# Patient Record
Sex: MALE | Race: BLACK OR AFRICAN AMERICAN | Employment: OTHER
[De-identification: names, ages, dates, MRNs, and addresses within clinical notes are randomized per-mention and may not be internally consistent; named-entity substitution may affect disease eponyms.]

---

## 2017-01-04 ENCOUNTER — HOME CARE VISIT (OUTPATIENT)
Dept: SCHEDULING | Facility: HOME HEALTH | Age: 77
End: 2017-01-04
Payer: MEDICARE

## 2017-01-04 VITALS
HEART RATE: 98 BPM | TEMPERATURE: 95.5 F | RESPIRATION RATE: 18 BRPM | SYSTOLIC BLOOD PRESSURE: 140 MMHG | OXYGEN SATURATION: 96 % | DIASTOLIC BLOOD PRESSURE: 80 MMHG

## 2017-01-04 PROCEDURE — G0157 HHC PT ASSISTANT EA 15: HCPCS

## 2017-01-06 ENCOUNTER — HOME CARE VISIT (OUTPATIENT)
Dept: SCHEDULING | Facility: HOME HEALTH | Age: 77
End: 2017-01-06
Payer: MEDICARE

## 2017-01-06 VITALS
RESPIRATION RATE: 16 BRPM | SYSTOLIC BLOOD PRESSURE: 100 MMHG | HEART RATE: 72 BPM | TEMPERATURE: 97.8 F | DIASTOLIC BLOOD PRESSURE: 70 MMHG

## 2017-01-06 PROCEDURE — G0157 HHC PT ASSISTANT EA 15: HCPCS

## 2017-01-09 ENCOUNTER — HOME CARE VISIT (OUTPATIENT)
Dept: SCHEDULING | Facility: HOME HEALTH | Age: 77
End: 2017-01-09
Payer: MEDICARE

## 2017-01-09 VITALS
DIASTOLIC BLOOD PRESSURE: 90 MMHG | TEMPERATURE: 96 F | HEART RATE: 76 BPM | RESPIRATION RATE: 18 BRPM | SYSTOLIC BLOOD PRESSURE: 140 MMHG

## 2017-01-09 PROCEDURE — G0157 HHC PT ASSISTANT EA 15: HCPCS

## 2017-01-12 ENCOUNTER — HOME CARE VISIT (OUTPATIENT)
Dept: SCHEDULING | Facility: HOME HEALTH | Age: 77
End: 2017-01-12
Payer: MEDICARE

## 2017-01-12 PROCEDURE — G0151 HHCP-SERV OF PT,EA 15 MIN: HCPCS

## 2017-01-16 VITALS
OXYGEN SATURATION: 98 % | RESPIRATION RATE: 24 BRPM | HEART RATE: 80 BPM | SYSTOLIC BLOOD PRESSURE: 142 MMHG | DIASTOLIC BLOOD PRESSURE: 78 MMHG | TEMPERATURE: 98.2 F

## 2017-01-17 ENCOUNTER — HOME CARE VISIT (OUTPATIENT)
Dept: SCHEDULING | Facility: HOME HEALTH | Age: 77
End: 2017-01-17
Payer: MEDICARE

## 2017-01-17 PROCEDURE — G0299 HHS/HOSPICE OF RN EA 15 MIN: HCPCS

## 2017-01-18 VITALS
TEMPERATURE: 97.3 F | DIASTOLIC BLOOD PRESSURE: 73 MMHG | RESPIRATION RATE: 20 BRPM | SYSTOLIC BLOOD PRESSURE: 150 MMHG | HEART RATE: 64 BPM

## 2017-01-19 ENCOUNTER — HOME CARE VISIT (OUTPATIENT)
Dept: SCHEDULING | Facility: HOME HEALTH | Age: 77
End: 2017-01-19
Payer: MEDICARE

## 2017-01-19 ENCOUNTER — HOME CARE VISIT (OUTPATIENT)
Dept: HOME HEALTH SERVICES | Facility: HOME HEALTH | Age: 77
End: 2017-01-19
Payer: MEDICARE

## 2017-01-19 PROCEDURE — G0151 HHCP-SERV OF PT,EA 15 MIN: HCPCS

## 2017-01-20 ENCOUNTER — HOME CARE VISIT (OUTPATIENT)
Dept: SCHEDULING | Facility: HOME HEALTH | Age: 77
End: 2017-01-20
Payer: MEDICARE

## 2017-01-20 ENCOUNTER — HOME CARE VISIT (OUTPATIENT)
Dept: HOME HEALTH SERVICES | Facility: HOME HEALTH | Age: 77
End: 2017-01-20
Payer: MEDICARE

## 2017-01-20 VITALS
HEART RATE: 71 BPM | TEMPERATURE: 97.7 F | DIASTOLIC BLOOD PRESSURE: 80 MMHG | SYSTOLIC BLOOD PRESSURE: 120 MMHG | RESPIRATION RATE: 18 BRPM

## 2017-01-20 VITALS
SYSTOLIC BLOOD PRESSURE: 138 MMHG | DIASTOLIC BLOOD PRESSURE: 78 MMHG | TEMPERATURE: 98.1 F | RESPIRATION RATE: 24 BRPM | HEART RATE: 74 BPM | OXYGEN SATURATION: 98 %

## 2017-01-20 PROCEDURE — G0299 HHS/HOSPICE OF RN EA 15 MIN: HCPCS

## 2017-01-23 ENCOUNTER — HOME CARE VISIT (OUTPATIENT)
Dept: SCHEDULING | Facility: HOME HEALTH | Age: 77
End: 2017-01-23
Payer: MEDICARE

## 2017-01-23 PROCEDURE — G0155 HHCP-SVS OF CSW,EA 15 MIN: HCPCS

## 2017-01-24 ENCOUNTER — HOME CARE VISIT (OUTPATIENT)
Dept: SCHEDULING | Facility: HOME HEALTH | Age: 77
End: 2017-01-24
Payer: MEDICARE

## 2017-01-24 VITALS
RESPIRATION RATE: 18 BRPM | SYSTOLIC BLOOD PRESSURE: 128 MMHG | DIASTOLIC BLOOD PRESSURE: 70 MMHG | HEART RATE: 74 BPM | TEMPERATURE: 97 F

## 2017-01-24 PROCEDURE — G0299 HHS/HOSPICE OF RN EA 15 MIN: HCPCS

## 2017-01-26 ENCOUNTER — HOME CARE VISIT (OUTPATIENT)
Dept: HOME HEALTH SERVICES | Facility: HOME HEALTH | Age: 77
End: 2017-01-26
Payer: MEDICARE

## 2017-01-26 VITALS
DIASTOLIC BLOOD PRESSURE: 88 MMHG | RESPIRATION RATE: 18 BRPM | SYSTOLIC BLOOD PRESSURE: 137 MMHG | TEMPERATURE: 97.9 F | HEART RATE: 72 BPM

## 2017-01-26 PROCEDURE — G0299 HHS/HOSPICE OF RN EA 15 MIN: HCPCS

## 2017-01-28 ENCOUNTER — HOSPITAL ENCOUNTER (EMERGENCY)
Age: 77
Discharge: HOME | End: 2017-01-28
Attending: EMERGENCY MEDICINE
Payer: MEDICARE

## 2017-01-28 ENCOUNTER — APPOINTMENT (OUTPATIENT)
Dept: GENERAL RADIOLOGY | Age: 77
End: 2017-01-28
Attending: EMERGENCY MEDICINE
Payer: MEDICARE

## 2017-01-28 VITALS
OXYGEN SATURATION: 93 % | SYSTOLIC BLOOD PRESSURE: 158 MMHG | WEIGHT: 230 LBS | DIASTOLIC BLOOD PRESSURE: 70 MMHG | BODY MASS INDEX: 34.07 KG/M2 | TEMPERATURE: 98.5 F | HEIGHT: 69 IN | RESPIRATION RATE: 18 BRPM | HEART RATE: 96 BPM

## 2017-01-28 DIAGNOSIS — J06.9 ACUTE UPPER RESPIRATORY INFECTION: Primary | ICD-10-CM

## 2017-01-28 LAB
ALBUMIN SERPL BCP-MCNC: 4.3 G/DL (ref 3.2–4.6)
ALBUMIN/GLOB SERPL: 1 {RATIO} (ref 1.2–3.5)
ALP SERPL-CCNC: 117 U/L (ref 50–136)
ALT SERPL-CCNC: 31 U/L (ref 12–65)
ANION GAP BLD CALC-SCNC: 10 MMOL/L (ref 7–16)
AST SERPL W P-5'-P-CCNC: 61 U/L (ref 15–37)
BASOPHILS # BLD AUTO: 0 K/UL (ref 0–0.2)
BASOPHILS # BLD: 0 % (ref 0–2)
BILIRUB SERPL-MCNC: 1 MG/DL (ref 0.2–1.1)
BUN SERPL-MCNC: 19 MG/DL (ref 8–23)
CALCIUM SERPL-MCNC: 9.3 MG/DL (ref 8.3–10.4)
CHLORIDE SERPL-SCNC: 102 MMOL/L (ref 98–107)
CO2 SERPL-SCNC: 27 MMOL/L (ref 21–32)
CREAT SERPL-MCNC: 1.81 MG/DL (ref 0.8–1.5)
DIFFERENTIAL METHOD BLD: ABNORMAL
EOSINOPHIL # BLD: 0 K/UL (ref 0–0.8)
EOSINOPHIL NFR BLD: 0 % (ref 0.5–7.8)
ERYTHROCYTE [DISTWIDTH] IN BLOOD BY AUTOMATED COUNT: 13.9 % (ref 11.9–14.6)
GLOBULIN SER CALC-MCNC: 4.1 G/DL (ref 2.3–3.5)
GLUCOSE SERPL-MCNC: 88 MG/DL (ref 65–100)
HCT VFR BLD AUTO: 44.1 % (ref 41.1–50.3)
HGB BLD-MCNC: 14.7 G/DL (ref 13.6–17.2)
IMM GRANULOCYTES # BLD: 0 K/UL (ref 0–0.5)
IMM GRANULOCYTES NFR BLD AUTO: 0.3 % (ref 0–5)
LYMPHOCYTES # BLD AUTO: 14 % (ref 13–44)
LYMPHOCYTES # BLD: 1.3 K/UL (ref 0.5–4.6)
MCH RBC QN AUTO: 30.4 PG (ref 26.1–32.9)
MCHC RBC AUTO-ENTMCNC: 33.3 G/DL (ref 31.4–35)
MCV RBC AUTO: 91.3 FL (ref 79.6–97.8)
MONOCYTES # BLD: 0.5 K/UL (ref 0.1–1.3)
MONOCYTES NFR BLD AUTO: 5 % (ref 4–12)
NEUTS SEG # BLD: 7.6 K/UL (ref 1.7–8.2)
NEUTS SEG NFR BLD AUTO: 81 % (ref 43–78)
PLATELET # BLD AUTO: 182 K/UL (ref 150–450)
PMV BLD AUTO: 12.8 FL (ref 10.8–14.1)
POTASSIUM SERPL-SCNC: 4.1 MMOL/L (ref 3.5–5.1)
PROT SERPL-MCNC: 8.4 G/DL (ref 6.3–8.2)
RBC # BLD AUTO: 4.83 M/UL (ref 4.23–5.67)
SODIUM SERPL-SCNC: 139 MMOL/L (ref 136–145)
TROPONIN I SERPL-MCNC: 0.02 NG/ML (ref 0.02–0.05)
WBC # BLD AUTO: 9.5 K/UL (ref 4.3–11.1)

## 2017-01-28 PROCEDURE — 93005 ELECTROCARDIOGRAM TRACING: CPT | Performed by: EMERGENCY MEDICINE

## 2017-01-28 PROCEDURE — 80053 COMPREHEN METABOLIC PANEL: CPT | Performed by: EMERGENCY MEDICINE

## 2017-01-28 PROCEDURE — 84484 ASSAY OF TROPONIN QUANT: CPT | Performed by: EMERGENCY MEDICINE

## 2017-01-28 PROCEDURE — 71020 XR CHEST PA LAT: CPT

## 2017-01-28 PROCEDURE — 99284 EMERGENCY DEPT VISIT MOD MDM: CPT | Performed by: EMERGENCY MEDICINE

## 2017-01-28 PROCEDURE — 85025 COMPLETE CBC W/AUTO DIFF WBC: CPT | Performed by: EMERGENCY MEDICINE

## 2017-01-28 RX ORDER — METOPROLOL TARTRATE 5 MG/5ML
5 INJECTION INTRAVENOUS ONCE
Status: DISCONTINUED | OUTPATIENT
Start: 2017-01-28 | End: 2017-01-28 | Stop reason: HOSPADM

## 2017-01-28 RX ORDER — BENZONATATE 100 MG/1
100 CAPSULE ORAL
Qty: 30 CAP | Refills: 0 | Status: SHIPPED | OUTPATIENT
Start: 2017-01-28 | End: 2017-02-04

## 2017-01-28 RX ORDER — OXYMETAZOLINE HCL 0.05 %
2 SPRAY, NON-AEROSOL (ML) NASAL
Qty: 1 BOTTLE | Refills: 0 | Status: SHIPPED | OUTPATIENT
Start: 2017-01-28 | End: 2017-09-05

## 2017-01-28 NOTE — ED TRIAGE NOTES
Per patient non-productive cough for the past three days. Patient complains of chest pain upon coughing.  Denies sob

## 2017-01-28 NOTE — DISCHARGE INSTRUCTIONS

## 2017-01-28 NOTE — ED PROVIDER NOTES
HPI Comments: Patient presents to return complaining of nasal congestion cough and rhinorrhea for the past 2 days. He does have a history of atrial fibrillation and CHF and hypertension but denies any chest pain or shortness of breath otherwise he denies any swelling of his lower extremities as well denies fever or chills. Nasal congestion and rhinorrhea has been clear in nature and that cough has been nonproductive    Patient is a 67 y.o. male presenting with nasal congestion. The history is provided by the patient. Nasal Congestion   This is a new problem. The current episode started 2 days ago. The problem occurs constantly. The problem has not changed since onset. Pertinent negatives include no chest pain, no abdominal pain, no headaches and no shortness of breath. Nothing aggravates the symptoms. Nothing relieves the symptoms. He has tried nothing for the symptoms. The treatment provided no relief. Past Medical History:   Diagnosis Date    Arthritis     Benign essential hypertension 10/27/2016    BPH (benign prostatic hypertrophy) 1/7/2014    BXO (balanitis xerotica obliterans) 10/27/2016    CHF [facility]congestive heart failure) ([facility] 10/27/2016    Diabetes ([facility]     Edema 10/27/2016    HTN (hypertension) 1/7/2014    Hypercholesterolemia 1/7/2014    Hypertensive heart disease without HF (heart failure) 10/27/2016    Hypertrophy of prostate with urinary obstruction and other lower urinary tract symptoms (LUTS) 1/7/2014    Hypertrophy of prostate without urinary obstruction and other lower urinary tract symptoms (LUTS) 1/7/2014    Osteoarthrosis, unspecified site 10/27/2016    PVD (peripheral vascular disease) ([facility] 10/27/2016    Urinary frequency 1/7/2014       Past Surgical History:   Procedure Laterality Date    Hx orthopaedic       hip replacement    Hx urological       cystoscopy         No family history on file.     Social History     Social History    Marital status: SINGLE     Spouse name: N/A    Number of children: N/A    Years of education: N/A     Occupational History    Not on file. Social History Main Topics    Smoking status: Former Smoker     Quit date: 7/31/2000    Smokeless tobacco: Not on file    Alcohol use No    Drug use: No    Sexual activity: Not on file     Other Topics Concern    Not on file     Social History Narrative         ALLERGIES: Review of patient's allergies indicates no known allergies. Review of Systems   Respiratory: Negative for shortness of breath. Cardiovascular: Negative for chest pain. Gastrointestinal: Negative for abdominal pain. Neurological: Negative for headaches. All other systems reviewed and are negative. Vitals:    01/28/17 1355 01/28/17 1357 01/28/17 1401 01/28/17 1402   BP:       Pulse: 94 99 98 96   Resp:       Temp:       SpO2:       Weight:       Height:                Physical Exam   Constitutional: He is oriented to person, place, and time. He appears well-developed and well-nourished. No distress. HENT:   Head: Normocephalic and atraumatic. Positive nasal congestion with clear rhinorrhea   Eyes: Conjunctivae and EOM are normal. Pupils are equal, round, and reactive to light. Neck: Normal range of motion. Neck supple. Cardiovascular: Normal rate, regular rhythm, normal heart sounds and intact distal pulses. Pulmonary/Chest: Effort normal and breath sounds normal.   Abdominal: Soft. Bowel sounds are normal.   Musculoskeletal: Normal range of motion. He exhibits no edema, tenderness or deformity. Neurological: He is alert and oriented to person, place, and time. Skin: Skin is warm and dry. Psychiatric: He has a normal mood and affect. His behavior is normal.   Nursing note and vitals reviewed.        MDM  Number of Diagnoses or Management Options  Acute upper respiratory infection:   Diagnosis management comments: Given the patient's history and mild tachycardia and atrial fibrillation will evaluate for CHF as a potential etiology for his congestion and cough.        Amount and/or Complexity of Data Reviewed  Clinical lab tests: ordered and reviewed  Tests in the radiology section of CPT®: ordered and reviewed  Independent visualization of images, tracings, or specimens: yes    Risk of Complications, Morbidity, and/or Mortality  Presenting problems: moderate  Diagnostic procedures: moderate  Management options: moderate    Patient Progress  Patient progress: stable    ED Course       Procedures

## 2017-01-28 NOTE — ED NOTES
Pt to er c/o having chest pain when he coughs. .. sts pain for 2 days. .. sts pain only when he coughs. .. Denies any n/v/d. .. Pt c/o sob at times.

## 2017-01-29 LAB
ATRIAL RATE: 97 BPM
CALCULATED P AXIS, ECG09: NORMAL DEGREES
CALCULATED R AXIS, ECG10: -2 DEGREES
CALCULATED T AXIS, ECG11: 6 DEGREES
DIAGNOSIS, 93000: NORMAL
DIASTOLIC BP, ECG02: NORMAL MMHG
P-R INTERVAL, ECG05: NORMAL MS
Q-T INTERVAL, ECG07: 338 MS
QRS DURATION, ECG06: 88 MS
QTC CALCULATION (BEZET), ECG08: 452 MS
SYSTOLIC BP, ECG01: NORMAL MMHG
VENTRICULAR RATE, ECG03: 108 BPM

## 2018-01-23 PROBLEM — E11.21 TYPE 2 DIABETES MELLITUS WITH NEPHROPATHY (HCC): Status: ACTIVE | Noted: 2018-01-23

## 2018-02-14 ENCOUNTER — PATIENT OUTREACH (OUTPATIENT)
Dept: CASE MANAGEMENT | Age: 78
End: 2018-02-14

## 2018-02-14 NOTE — PROGRESS NOTES
TC to pt's home for weekly CCM follow up. Pt stated that overall he is doing ok although he is still bothered by the gout. He stated that he started the medication for it on Saturday but is not experiencing any relief. SW encouraged him to call the MD by the end of the week if it is no better. He stated he would try to wait until the beginning of next week. SW reminded him that [facility] is open on the weekends if he needs to see a MD, hopefully avoiding a trip to the ER. He verbalized understanding. No new needs or concerns identified or reported. SW will follow up with pt next week. This note will not be viewable in [address]

## 2019-06-12 PROBLEM — R73.03 DIABETES MELLITUS, LATENT: Status: ACTIVE | Noted: 2019-06-12

## 2019-06-12 PROBLEM — I48.0 PAROXYSMAL ATRIAL FIBRILLATION (HCC): Status: ACTIVE | Noted: 2019-06-12

## 2019-06-12 PROBLEM — I10 SYSTEMIC HYPERTENSIVE DISORDER COMPLICATION: Status: ACTIVE | Noted: 2019-06-12

## 2019-06-12 PROBLEM — M79.89 LEG SWELLING: Status: ACTIVE | Noted: 2019-06-12

## 2019-08-12 PROBLEM — E66.09 CLASS 1 OBESITY DUE TO EXCESS CALORIES WITH SERIOUS COMORBIDITY AND BODY MASS INDEX (BMI) OF 32.0 TO 32.9 IN ADULT: Status: ACTIVE | Noted: 2019-08-12

## 2019-08-21 ENCOUNTER — HOSPITAL ENCOUNTER (OUTPATIENT)
Dept: NON INVASIVE DIAGNOSTICS | Age: 79
Discharge: HOME | End: 2019-08-21
Attending: FAMILY MEDICINE
Payer: COMMERCIAL

## 2019-08-21 DIAGNOSIS — R06.02 SOB (SHORTNESS OF BREATH): ICD-10-CM

## 2019-08-21 DIAGNOSIS — I11.9 HYPERTENSIVE HEART DISEASE WITHOUT HF (HEART FAILURE): ICD-10-CM

## 2019-08-21 DIAGNOSIS — I50.9 CHRONIC CONGESTIVE HEART FAILURE, UNSPECIFIED HEART FAILURE TYPE (HCC): ICD-10-CM

## 2019-08-21 PROCEDURE — 74011250636 HC RX REV CODE- 250/636: Performed by: FAMILY MEDICINE

## 2019-08-21 PROCEDURE — C8929 TTE W OR WO FOL WCON,DOPPLER: HCPCS

## 2019-08-21 RX ADMIN — PERFLUTREN 1 ML: 6.52 INJECTION, SUSPENSION INTRAVENOUS at 15:17

## 2020-06-11 PROBLEM — R73.03 DIABETES MELLITUS, LATENT: Status: RESOLVED | Noted: 2019-06-12 | Resolved: 2020-06-11

## 2020-06-18 PROBLEM — I48.11 LONGSTANDING PERSISTENT ATRIAL FIBRILLATION (HCC): Status: ACTIVE | Noted: 2020-06-18

## 2020-06-18 PROBLEM — E11.21 DIABETIC NEPHROPATHY ASSOCIATED WITH TYPE 2 DIABETES MELLITUS (HCC): Status: ACTIVE | Noted: 2020-06-18

## 2020-06-18 PROBLEM — N18.30 STAGE 3 CHRONIC KIDNEY DISEASE (HCC): Status: ACTIVE | Noted: 2020-06-18

## 2021-02-05 ENCOUNTER — HOSPITAL ENCOUNTER (OUTPATIENT)
Dept: WOUND CARE | Age: 81
Discharge: HOME | End: 2021-02-05
Attending: SURGERY
Payer: COMMERCIAL

## 2021-02-05 VITALS
OXYGEN SATURATION: 93 % | HEIGHT: 69 IN | SYSTOLIC BLOOD PRESSURE: 139 MMHG | TEMPERATURE: 98.7 F | HEART RATE: 76 BPM | RESPIRATION RATE: 20 BRPM | BODY MASS INDEX: 30.07 KG/M2 | DIASTOLIC BLOOD PRESSURE: 81 MMHG | WEIGHT: 203 LBS

## 2021-02-05 DIAGNOSIS — L97.921 NON-PRESSURE CHRONIC ULCER OF LEFT LOWER LEG, LIMITED TO BREAKDOWN OF SKIN (HCC): Primary | ICD-10-CM

## 2021-02-05 PROCEDURE — 29580 STRAPPING UNNA BOOT: CPT

## 2021-02-05 NOTE — WOUND CARE
02/05/21 1343   Wound Leg lower Left;Posterior #1 02/05/21   Date First Assessed: 02/05/21   Wound Approximate Age at First Assessment (Weeks): 2 weeks  Primary Wound Type: Venous  Location: Leg lower  Wound Location Orientation: Left;Posterior  Wound Description: #1  Date of First Observation: 02/05/21   Wound Image    Wound Etiology Venous   Dressing Status Clean;Dry; Intact   Cleansed Soap and water   Dressing/Treatment   (Unna Boot)   Wound Length (cm) 1.5 cm   Wound Width (cm) 2.3 cm   Wound Depth (cm) 0.1 cm   Wound Surface Area (cm^2) 3.45 cm^2   Wound Volume (cm^3) 0.34 cm^3   Wound Assessment Pink/red;Purple/maroon   Drainage Amount Small   Drainage Description Serosanguinous   Wound Odor None   Sarah-Wound/Incision Assessment Denuded   Wound Thickness Description Full thickness   Patient takes ASA and Eliquis Daily

## 2021-02-05 NOTE — WOUND CARE
[facility]  [address]  [address]  [place] [address]  Phone: [phone]  Fax: [phone]    Patient: Anam Ferrer MRN: [id number]  SSN: [id number]    YOB: 1940  Age: 80 y.o.  Sex: male       Return Appointment: 1 week with Bong Whitmore MD    Instructions: Left Lower Posterior Leg    Cleanse with Normal Saline  Xeroform- apply to wound bed.  Cover with ABD  Wrap with Unna boot with wound and lower extremity assessment.  If there is any problem with the dressing (too tight, slides down,, etc.)  Patient to return to clinic to have re-wrapped by clinician.  Calamine Co Flex Unna Boot  Change [facility] at [facility]    DO NOT GET WOUND WET-MAY PURCHASE A CAST COVER AT [facility] OR [facility]    GRACE's Ordered at Todays Visit-02.05.21-Vascular Office Will Call to Schedule    Should you experience increased redness, swelling, pain, foul odor, size of wound(s), or have a temperature over 101 degrees please contact the [facility] at [phone] or if after hours contact your primary care physician or go to the hospital emergency department.    Signed By: Swapna Reynoso RN     February 5, 2021

## 2021-02-05 NOTE — WOUND CARE
[employer] Application   Below Knee    NAME:  Madhuri Head OF BIRTH:  1940  MEDICAL RECORD NUMBER:  [id number]  DATE:  2/5/2021    Remove old [employer] or Boots. Appied primary and secondary dressing as ordered. Applied Unna roll from toes to knee overlapping each time. Applied ace wrap or coban from toes to below the knee. Secured with tape and/or metal clips covered with tape. Instructed patient/caregiver to keep dressing dry and intact. DO NOT REMOVE DRESSING. Instructed pt/family/caregiver to report excessive draining, loose bandage, wet dressing, severe pain or tingling in toes. Applied [employer] dressing below the knee to left lower leg. Unna Boot(s) were applied per  Guidelines.     Response to treatment: Well tolerated by patient      Electronically signed by Macy Dakin, RN on 2/5/2021 at 2:27 PM

## 2021-02-05 NOTE — DISCHARGE INSTRUCTIONS
[address]  [address], [address]  Phone: [phone]  Fax: [phone]    Patient: Khoi Veronica MRN: [id number]  SSN: [id number]    YOB: 1940  Age: 79 y.o. Sex: male       Return Appointment: 1 week with Gumaro Oneill MD    Instructions: Left Lower Posterior Leg    Cleanse with Normal Saline  Xeroform- apply to wound bed. Cover with ABD  Wrap with Unna boot with wound and lower extremity assessment. If there is any problem with the dressing (too tight, slides down,, etc.)  Patient to return to clinic to have re-wrapped by clinician. Calamine Co Flex Unna Boot  Change Weekly at [facility]    GRACE's Ordered at Portal Will Call to Schedule    Should you experience increased redness, swelling, pain, foul odor, size of wound(s), or have a temperature over 101 degrees please contact the [address] [place] [address] Road at [phone] or if after hours contact your primary care physician or go to the hospital emergency department.     Signed By: Marlin Rodriguez RN     February 5, 2021

## 2021-02-12 ENCOUNTER — HOSPITAL ENCOUNTER (OUTPATIENT)
Dept: WOUND CARE | Age: 81
Discharge: HOME | End: 2021-02-12
Attending: SURGERY
Payer: COMMERCIAL

## 2021-02-12 VITALS
WEIGHT: 205 LBS | BODY MASS INDEX: 30.36 KG/M2 | HEART RATE: 94 BPM | RESPIRATION RATE: 20 BRPM | HEIGHT: 69 IN | DIASTOLIC BLOOD PRESSURE: 72 MMHG | TEMPERATURE: 97.7 F | SYSTOLIC BLOOD PRESSURE: 126 MMHG

## 2021-02-12 PROCEDURE — 29580 STRAPPING UNNA BOOT: CPT

## 2021-02-12 NOTE — WOUND CARE
02/12/21 1453   Wound Leg lower Left;Posterior #1 02/05/21   Date First Assessed: 02/05/21   Wound Approximate Age at First Assessment (Weeks): 2 weeks  Primary Wound Type: Venous  Location: Leg lower  Wound Location Orientation: Left;Posterior  Wound Description: #1  Date of First Observation: 02/05/21   Wound Image    Wound Etiology Venous   Dressing Status Clean;Dry; Intact   Cleansed Soap and water   Dressing/Treatment   (xeroform, abd, unna boot)   Wound Length (cm) 0.1 cm   Wound Width (cm) 0.1 cm   Wound Depth (cm) 0.1 cm   Wound Surface Area (cm^2) 0.01 cm^2   Change in Wound Size % 99.71   Wound Volume (cm^3) 0 cm^3   Wound Healing % 100   Wound Assessment Pink/red;Epithelialization   Drainage Amount Small   Drainage Description Serous   Wound Odor None   Sarah-Wound/Incision Assessment Edematous   Wound Thickness Description Full thickness   Patient is on an anti-coagulant daily eliquis.

## 2021-02-12 NOTE — WOUND CARE
[employer] Below Knee NAME:  Luz Claude YOB: 1940 MEDICAL RECORD NUMBER:  [id number] DATE:  2/12/2021 Remove old Unna Boot or Boots. Appied primary and secondary dressing as ordered. Applied Unna roll from toes to knee overlapping each time. Applied ace wrap or coban from toes to below the knee. Secured with tape and/or metal clips covered with tape. Instructed patient/caregiver to keep dressing dry and intact. DO NOT REMOVE DRESSING. Instructed pt/family/caregiver to report excessive draining, loose bandage, wet dressing, severe pain or tingling in toes. Applied [employer] dressing below the knee to left lower leg. Unna Boot(s) were applied per  Guidelines. Response to treatment: Well tolerated by patient  Electronically signed by Armani Schilling on 2/12/2021 at 3:49 PM

## 2021-02-12 NOTE — WOUND CARE
[address]  [address], [address]  Phone: [phone]  Fax: [phone]    Patient: Lesley Curling MRN: [id number]  SSN: [id number]    YOB: 1940  Age: 79 y.o. Sex: male       Return Appointment: 1 week with Zeinab Morales MD    Instructions: Right posterior leg  Cleanse wound and periwound with wound cleanser or normal saline. Xeroform- apply to wound bed. Unna boot with wound and lower extremity assessment. If there is any problem with the dressing (too tight, slides down,, etc.)  Patient to return to clinic to have re-wrapped by clinician.  (calamine unna boot used at today's visit)  Leave in place for one week. Jenniferjj Moe basic wraps ordered at today's visit. Bring these with you to your next appointment. Should you experience increased redness, swelling, pain, foul odor, size of wound(s), or have a temperature over 101 degrees please contact the [address] [place] [address] Road at [phone] or if after hours contact your primary care physician or go to the hospital emergency department.     Signed By: Helio Schulte     February 12, 2021

## 2021-02-19 ENCOUNTER — HOSPITAL ENCOUNTER (OUTPATIENT)
Dept: WOUND CARE | Age: 81
Discharge: HOME | End: 2021-02-19
Attending: SURGERY
Payer: COMMERCIAL

## 2021-02-19 VITALS
BODY MASS INDEX: 30.07 KG/M2 | TEMPERATURE: 98.1 F | RESPIRATION RATE: 18 BRPM | WEIGHT: 203 LBS | OXYGEN SATURATION: 98 % | HEART RATE: 92 BPM | DIASTOLIC BLOOD PRESSURE: 88 MMHG | SYSTOLIC BLOOD PRESSURE: 129 MMHG | HEIGHT: 69 IN

## 2021-02-19 PROCEDURE — 99213 OFFICE O/P EST LOW 20 MIN: CPT

## 2021-02-19 NOTE — DISCHARGE INSTRUCTIONS
[address]  [address], [address]  Phone: [phone]  Fax: [phone]    Patient: Jone Ashley MRN: [id number]  SSN: [id number]    YOB: 1940  Age: 79 y.o. Sex: male       Return Appointment: 2 weeks with Candi Haider MD    Instructions: Right posterior leg  Cleanse wound and periwound with wound cleanser or normal saline. Place Farrow wrap on left lower leg first in morning and remove at night for bathing and sleeping. Apply moisturizer after bathing. Return in 2 weeks. Should you experience increased redness, swelling, pain, foul odor, size of wound(s), or have a temperature over 101 degrees please contact the [address] [place] [address] Road at [phone] or if after hours contact your primary care physician or go to the hospital emergency department.     Signed By: Gerber Tatum PT, [facility]     February 19, 2021

## 2021-02-19 NOTE — WOUND CARE
[address]  [address], [address]  Phone: [phone]  Fax: [phone]    Patient: Ashleigh Alexandre MRN: [id number]  SSN: [id number]    YOB: 1940  Age: 79 y.o. Sex: male       Return Appointment: 2 weeks with Shiloh Reyes MD    Instructions: Right posterior leg  Cleanse wound and periwound with wound cleanser or normal saline. Place Farrow wrap on left lower leg first in morning and remove at night for bathing and sleeping. Apply moisturizer after bathing. Return in 2 weeks. Should you experience increased redness, swelling, pain, foul odor, size of wound(s), or have a temperature over 101 degrees please contact the [address] [place] [address] Road at [phone] or if after hours contact your primary care physician or go to the hospital emergency department.     Signed By: Mary Momin PT, [facility]     February 19, 2021

## 2021-02-19 NOTE — PROGRESS NOTES
02/19/21 1115   Wound Leg lower Left;Posterior #1 02/05/21   Date First Assessed: 02/05/21   Wound Approximate Age at First Assessment (Weeks): 2 weeks  Primary Wound Type: Venous  Location: Leg lower  Wound Location Orientation: Left;Posterior  Wound Description: #1  Date of First Observation: 02/05/21   Wound Image    Wound Etiology Venous   Dressing Status Clean;Dry; Intact   Cleansed Soap and water   Dressing/Treatment   (Xeroform, Calamine Coflex)   Wound Length (cm) 0 cm   Wound Width (cm) 0 cm   Wound Depth (cm) 0 cm   Wound Surface Area (cm^2) 0 cm^2   Change in Wound Size % 100   Wound Volume (cm^3) 0 cm^3   Wound Healing % 100   Wound Assessment Epithelialization   Drainage Amount None   Wound Odor None   Patient is currently taking Eliquis and Aspirin 81 mg

## 2021-03-05 ENCOUNTER — HOSPITAL ENCOUNTER (OUTPATIENT)
Dept: WOUND CARE | Age: 81
Discharge: HOME | End: 2021-03-05
Attending: SURGERY
Payer: COMMERCIAL

## 2021-03-05 VITALS
HEART RATE: 88 BPM | DIASTOLIC BLOOD PRESSURE: 93 MMHG | WEIGHT: 210.4 LBS | HEIGHT: 69 IN | OXYGEN SATURATION: 96 % | RESPIRATION RATE: 18 BRPM | SYSTOLIC BLOOD PRESSURE: 139 MMHG | TEMPERATURE: 97.6 F | BODY MASS INDEX: 31.16 KG/M2

## 2021-03-05 PROCEDURE — 99212 OFFICE O/P EST SF 10 MIN: CPT

## 2021-03-05 NOTE — DISCHARGE INSTRUCTIONS
[address]  [address], [address]  Phone: [phone]  Fax: [phone]    Patient: Bri Velarde MRN: [id number]  SSN: [id number]    YOB: 1940  Age: 79 y.o. Sex: male       Return Appointment: Discharge with Jef Reyes MD    Instructions: Right posterior leg  Cleanse wound and periwound with wound cleanser or normal saline. Place Farrow wrap on left lower leg first in morning and remove at night for bathing and sleeping. Apply moisturizer after bathing. No dressing needed. Should you experience increased redness, swelling, pain, foul odor, size of wound(s), or have a temperature over 101 degrees please contact the [address] [place] [address] Road at [phone] or if after hours contact your primary care physician or go to the hospital emergency department.     Signed By: Garrett Lim RN     March 5, 2021

## 2021-03-05 NOTE — WOUND CARE
03/05/21 1104   Wound Leg lower Left;Posterior #1 02/05/21   Date First Assessed: 02/05/21   Wound Approximate Age at First Assessment (Weeks): 2 weeks  Primary Wound Type: Venous  Location: Leg lower  Wound Location Orientation: Left;Posterior  Wound Description: #1  Date of First Observation: 02/05/21   Wound Image    Wound Etiology Venous   Dressing/Treatment Open to air   Wound Length (cm) 0 cm   Wound Width (cm) 0 cm   Wound Depth (cm) 0 cm   Wound Surface Area (cm^2) 0 cm^2   Change in Wound Size % 100   Wound Volume (cm^3) 0 cm^3   Wound Healing % 100   Wound Assessment Epithelialization   Drainage Amount None   Wound Odor None   patient is taking eliquis and aspirin

## 2021-03-05 NOTE — WOUND CARE
[address]  [address], [address]  Phone: [phone]  Fax: [phone]    Patient: Delmy Talbert MRN: [id number]  SSN: [id number]    YOB: 1940  Age: 79 y.o. Sex: male       Return Appointment: Discharge with Cherrie Simeon MD    Instructions: Right posterior leg  Cleanse wound and periwound with wound cleanser or normal saline. Place Farrow wrap on left lower leg first in morning and remove at night for bathing and sleeping. Apply moisturizer after bathing. No dressing needed. Should you experience increased redness, swelling, pain, foul odor, size of wound(s), or have a temperature over 101 degrees please contact the [address] [place] [address] Road at [phone] or if after hours contact your primary care physician or go to the hospital emergency department.     Signed By: Filippo Geiger RN     March 5, 2021

## 2021-03-25 ENCOUNTER — HOME HEALTH ADMISSION (OUTPATIENT)
Dept: HOME HEALTH SERVICES | Facility: HOME HEALTH | Age: 81
End: 2021-03-25
Payer: COMMERCIAL

## 2021-03-27 ENCOUNTER — HOME CARE VISIT (OUTPATIENT)
Dept: SCHEDULING | Facility: HOME HEALTH | Age: 81
End: 2021-03-27
Payer: COMMERCIAL

## 2021-03-27 VITALS
HEART RATE: 80 BPM | DIASTOLIC BLOOD PRESSURE: 78 MMHG | OXYGEN SATURATION: 94 % | RESPIRATION RATE: 18 BRPM | SYSTOLIC BLOOD PRESSURE: 118 MMHG | TEMPERATURE: 97.2 F

## 2021-03-27 PROCEDURE — G0162 HHC RN E&M PLAN SVS, 15 MIN: HCPCS

## 2021-03-27 PROCEDURE — 3331090001 HH PPS REVENUE CREDIT

## 2021-03-27 PROCEDURE — G0299 HHS/HOSPICE OF RN EA 15 MIN: HCPCS

## 2021-03-27 PROCEDURE — 400018 HH-NO PAY CLAIM PROCEDURE

## 2021-03-27 PROCEDURE — 400013 HH SOC

## 2021-03-27 PROCEDURE — 3331090002 HH PPS REVENUE DEBIT

## 2021-03-27 PROCEDURE — A6216 NON-STERILE GAUZE<=16 SQ IN: HCPCS

## 2021-03-27 PROCEDURE — A6456 ZINC PASTE BAND W >=3"<5"/YD: HCPCS

## 2021-03-27 PROCEDURE — A9270 NON-COVERED ITEM OR SERVICE: HCPCS

## 2021-03-28 PROCEDURE — 3331090002 HH PPS REVENUE DEBIT

## 2021-03-28 PROCEDURE — 3331090001 HH PPS REVENUE CREDIT

## 2021-03-29 ENCOUNTER — HOME CARE VISIT (OUTPATIENT)
Dept: SCHEDULING | Facility: HOME HEALTH | Age: 81
End: 2021-03-29
Payer: COMMERCIAL

## 2021-03-29 VITALS
HEART RATE: 72 BPM | DIASTOLIC BLOOD PRESSURE: 70 MMHG | TEMPERATURE: 97.9 F | OXYGEN SATURATION: 95 % | RESPIRATION RATE: 16 BRPM | SYSTOLIC BLOOD PRESSURE: 118 MMHG

## 2021-03-29 VITALS
SYSTOLIC BLOOD PRESSURE: 116 MMHG | RESPIRATION RATE: 18 BRPM | HEART RATE: 80 BPM | DIASTOLIC BLOOD PRESSURE: 68 MMHG | OXYGEN SATURATION: 95 % | TEMPERATURE: 98.4 F

## 2021-03-29 PROCEDURE — G0299 HHS/HOSPICE OF RN EA 15 MIN: HCPCS

## 2021-03-29 PROCEDURE — 3331090002 HH PPS REVENUE DEBIT

## 2021-03-29 PROCEDURE — A6456 ZINC PASTE BAND W >=3"<5"/YD: HCPCS

## 2021-03-29 PROCEDURE — 3331090001 HH PPS REVENUE CREDIT

## 2021-03-29 PROCEDURE — G0151 HHCP-SERV OF PT,EA 15 MIN: HCPCS

## 2021-03-30 PROCEDURE — 3331090001 HH PPS REVENUE CREDIT

## 2021-03-30 PROCEDURE — 3331090002 HH PPS REVENUE DEBIT

## 2021-03-31 ENCOUNTER — HOME CARE VISIT (OUTPATIENT)
Dept: SCHEDULING | Facility: HOME HEALTH | Age: 81
End: 2021-03-31
Payer: COMMERCIAL

## 2021-03-31 VITALS
HEART RATE: 74 BPM | DIASTOLIC BLOOD PRESSURE: 80 MMHG | TEMPERATURE: 97.2 F | OXYGEN SATURATION: 96 % | SYSTOLIC BLOOD PRESSURE: 130 MMHG | RESPIRATION RATE: 17 BRPM

## 2021-03-31 PROCEDURE — 3331090001 HH PPS REVENUE CREDIT

## 2021-03-31 PROCEDURE — 3331090002 HH PPS REVENUE DEBIT

## 2021-03-31 PROCEDURE — G0157 HHC PT ASSISTANT EA 15: HCPCS

## 2021-03-31 PROCEDURE — G0299 HHS/HOSPICE OF RN EA 15 MIN: HCPCS

## 2021-04-01 VITALS
TEMPERATURE: 97.4 F | DIASTOLIC BLOOD PRESSURE: 60 MMHG | RESPIRATION RATE: 16 BRPM | OXYGEN SATURATION: 94 % | HEART RATE: 78 BPM | SYSTOLIC BLOOD PRESSURE: 106 MMHG

## 2021-04-01 PROCEDURE — 3331090001 HH PPS REVENUE CREDIT

## 2021-04-01 PROCEDURE — 3331090002 HH PPS REVENUE DEBIT

## 2021-04-02 ENCOUNTER — HOME CARE VISIT (OUTPATIENT)
Dept: SCHEDULING | Facility: HOME HEALTH | Age: 81
End: 2021-04-02
Payer: COMMERCIAL

## 2021-04-02 ENCOUNTER — HOSPITAL ENCOUNTER (OUTPATIENT)
Dept: WOUND CARE | Age: 81
Discharge: HOME | End: 2021-04-02
Attending: SURGERY
Payer: COMMERCIAL

## 2021-04-02 VITALS
OXYGEN SATURATION: 95 % | HEART RATE: 80 BPM | DIASTOLIC BLOOD PRESSURE: 72 MMHG | SYSTOLIC BLOOD PRESSURE: 120 MMHG | TEMPERATURE: 97.4 F | RESPIRATION RATE: 18 BRPM

## 2021-04-02 PROCEDURE — 3331090002 HH PPS REVENUE DEBIT

## 2021-04-02 PROCEDURE — 3331090001 HH PPS REVENUE CREDIT

## 2021-04-02 PROCEDURE — 29580 STRAPPING UNNA BOOT: CPT

## 2021-04-02 NOTE — WOUND CARE
04/02/21 1209 Wound Leg lower Right;Medial #1 04/02/21 Date First Assessed/Time First Assessed: 04/02/21 1200   Present on Hospital Admission: Yes  Wound Approximate Age at First Assessment (Weeks): 6 weeks  Primary Wound Type: Venous Ulcer  Location: Leg lower  Wound Location Orientation: Right;Medial  W... Wound Image Wound Etiology Venous Dressing Status Clean; Intact; Old drainage noted Cleansed Cleansed with saline; Soap and water Dressing/Treatment  
Fredirick Long Boot) Wound Length (cm) 6 cm Wound Width (cm) 10 cm Wound Depth (cm) 0.1 cm Wound Surface Area (cm^2) 60 cm^2 Wound Volume (cm^3) 6 cm^3 Wound Assessment Granulation tissue;Pink/red;Slough Drainage Amount Moderate Drainage Description Serosanguinous Wound Odor None Sarah-Wound/Incision Assessment Blanchable erythema;Edematous Edges Attached edges Wound Thickness Description Full thickness Wound Leg lower Left; Anterior;Medial;Lateral #2 04/02/21 Date First Assessed/Time First Assessed: 04/02/21 1203   Present on Hospital Admission: Yes  Wound Approximate Age at First Assessment (Weeks): 6 weeks  Primary Wound Type: Venous Ulcer  Location: Leg lower  Wound Location Orientation: Left; Anterior;M. .. Wound Image Wound Etiology Venous Dressing Status Clean; Intact; Old drainage noted Cleansed Cleansed with saline; Soap and water Dressing/Treatment  
Fredirick Long Boot) Wound Length (cm) 23.5 cm Wound Width (cm) 10 cm Wound Depth (cm) 0.1 cm Wound Surface Area (cm^2) 235 cm^2 Wound Volume (cm^3) 23.5 cm^3 Wound Assessment Pale granulation tissue;Slough Drainage Amount Moderate Drainage Description Serosanguinous Wound Odor None Sarah-Wound/Incision Assessment Blanchable erythema;Edematous Edges Attached edges Wound Thickness Description Full thickness Patient is on ANTICOAGULANTS: Eliquis and ASA Wounds gently mechanically debrided with gauze and normal saline.

## 2021-04-02 NOTE — WOUND CARE
[employer] Below Knee NAME:  Wade Benton YOB: 1940 MEDICAL RECORD NUMBER:  [id number] DATE:  4/2/2021 Remove old Unna Boot or Boots. Applied moisturizing agent to dry skin as needed. Appied primary and secondary dressing as ordered. Applied Unna roll from toes to knee overlapping each time. Applied ace wrap or coban from toes to below the knee. Secured with tape and/or metal clips covered with tape. Instructed patient/caregiver to keep dressing dry and intact. DO NOT REMOVE DRESSING. Instructed pt/family/caregiver to report excessive draining, loose bandage, wet dressing, severe pain or tingling in toes. Applied [employer] dressing below the knee to bilateral lower legs. Unna Boot(s) were applied per  Guidelines. Response to treatment: Well tolerated by patient  Electronically signed by Zay Grove RN on 4/2/2021 at 11:57 AM

## 2021-04-02 NOTE — WOUND CARE
[address] [place] [address], [address] Phone: [phone] Fax: [phone] Patient: Chauncey Farley MRN: [id number]  SSN: [id number] YOB: 1940  Age: 79 y.o. Sex: male Return Appointment: 2 weeks with Laci Grove MD 
 
Instructions:  
Bilateral Lower Legs: 
Cleanse wound and periwound with wound cleanser or normal saline. Xeroform- apply to wound bed. Wrap with Calamine (or Zinc if Calamine unavailable) aspect of [employer]. Cover with ABD at wound bed sites. Wrap with Coban aspect of [employer]. Unna boot with wound and lower extremity assessment. If there is any problem with the dressing (too tight, slides down,, etc.)  Patient to return to clinic to have re-wrapped by clinician. Dressing changes 3x weekly. [facility] Next appointment at [address] [facility] in 2 weeks. Please provide wound care M-W-F-M-W, before patient's next appointment. Elevate lower legs when at rest. 
Float Heels on pillows to relieve heel pressure. Diabetic Diet with goal of Tight Blood Sugars for optimal wound healing. Maintain adequate fluid intake. Maintain adequate protein intake. Reduced Salt Diet. Should you experience increased redness, swelling, pain, foul odor, size of wound(s), or have a temperature over 101 degrees please contact the [address] [place] [address] Road at [phone] or if after hours contact your primary care physician or go to the hospital emergency department. Signed By: Mikayla Barbour RN April 2, 2021

## 2021-04-02 NOTE — DISCHARGE INSTRUCTIONS
[address]  [address], [address]  Phone: [phone]  Fax: [phone]    Patient: Marjorie Mora MRN: [id number]  SSN: [id number]    YOB: 1940  Age: 79 y.o. Sex: male       Return Appointment: 2 weeks with Jaime Umaña MD    Instructions:   Bilateral Lower Legs:  Cleanse wound and periwound with wound cleanser or normal saline. Xeroform- apply to wound bed. Wrap with Calamine (or Zinc if Calamine unavailable) aspect of [employer]. Cover with ABD at wound bed sites. Wrap with Coban aspect of [employer]. Unna boot with wound and lower extremity assessment. If there is any problem with the dressing (too tight, slides down,, etc.)  Patient to return to clinic to have re-wrapped by clinician. Dressing changes 3x weekly. [facility] Next appointment at [address] [facility] in 2 weeks. Please provide wound care M-W-F-M-W, before patient's next appointment. Elevate lower legs when at rest.  Float Heels on pillows to relieve heel pressure. Diabetic Diet with goal of Tight Blood Sugars for optimal wound healing. Maintain adequate fluid intake. Maintain adequate protein intake. Reduced Salt Diet. Should you experience increased redness, swelling, pain, foul odor, size of wound(s), or have a temperature over 101 degrees please contact the [address] [place] [address] Road at [phone] or if after hours contact your primary care physician or go to the hospital emergency department.     Signed By: Yanick Merino RN     April 2, 2021

## 2021-04-03 PROCEDURE — 3331090001 HH PPS REVENUE CREDIT

## 2021-04-03 PROCEDURE — 3331090002 HH PPS REVENUE DEBIT

## 2021-04-04 PROCEDURE — 3331090002 HH PPS REVENUE DEBIT

## 2021-04-04 PROCEDURE — 3331090001 HH PPS REVENUE CREDIT

## 2021-04-05 ENCOUNTER — HOME CARE VISIT (OUTPATIENT)
Dept: SCHEDULING | Facility: HOME HEALTH | Age: 81
End: 2021-04-05
Payer: COMMERCIAL

## 2021-04-05 VITALS
SYSTOLIC BLOOD PRESSURE: 120 MMHG | RESPIRATION RATE: 18 BRPM | TEMPERATURE: 97.2 F | DIASTOLIC BLOOD PRESSURE: 60 MMHG | HEART RATE: 95 BPM

## 2021-04-05 PROCEDURE — 3331090001 HH PPS REVENUE CREDIT

## 2021-04-05 PROCEDURE — G0299 HHS/HOSPICE OF RN EA 15 MIN: HCPCS

## 2021-04-05 PROCEDURE — 3331090002 HH PPS REVENUE DEBIT

## 2021-04-06 PROCEDURE — 3331090001 HH PPS REVENUE CREDIT

## 2021-04-06 PROCEDURE — 3331090002 HH PPS REVENUE DEBIT

## 2021-04-07 ENCOUNTER — HOME CARE VISIT (OUTPATIENT)
Dept: SCHEDULING | Facility: HOME HEALTH | Age: 81
End: 2021-04-07
Payer: COMMERCIAL

## 2021-04-07 VITALS
HEART RATE: 84 BPM | DIASTOLIC BLOOD PRESSURE: 70 MMHG | SYSTOLIC BLOOD PRESSURE: 116 MMHG | TEMPERATURE: 98.4 F | RESPIRATION RATE: 18 BRPM | OXYGEN SATURATION: 97 %

## 2021-04-07 VITALS
DIASTOLIC BLOOD PRESSURE: 70 MMHG | HEART RATE: 78 BPM | TEMPERATURE: 97.3 F | RESPIRATION RATE: 18 BRPM | OXYGEN SATURATION: 97 % | SYSTOLIC BLOOD PRESSURE: 120 MMHG

## 2021-04-07 PROCEDURE — G0299 HHS/HOSPICE OF RN EA 15 MIN: HCPCS

## 2021-04-07 PROCEDURE — 3331090001 HH PPS REVENUE CREDIT

## 2021-04-07 PROCEDURE — 3331090002 HH PPS REVENUE DEBIT

## 2021-04-07 PROCEDURE — G0151 HHCP-SERV OF PT,EA 15 MIN: HCPCS

## 2021-04-08 ENCOUNTER — HOME CARE VISIT (OUTPATIENT)
Dept: SCHEDULING | Facility: HOME HEALTH | Age: 81
End: 2021-04-08
Payer: COMMERCIAL

## 2021-04-08 VITALS
OXYGEN SATURATION: 98 % | HEART RATE: 92 BPM | TEMPERATURE: 97.8 F | SYSTOLIC BLOOD PRESSURE: 130 MMHG | DIASTOLIC BLOOD PRESSURE: 80 MMHG | RESPIRATION RATE: 17 BRPM

## 2021-04-08 PROCEDURE — 3331090001 HH PPS REVENUE CREDIT

## 2021-04-08 PROCEDURE — 3331090002 HH PPS REVENUE DEBIT

## 2021-04-08 PROCEDURE — G0157 HHC PT ASSISTANT EA 15: HCPCS

## 2021-04-09 ENCOUNTER — HOME CARE VISIT (OUTPATIENT)
Dept: SCHEDULING | Facility: HOME HEALTH | Age: 81
End: 2021-04-09
Payer: COMMERCIAL

## 2021-04-09 PROCEDURE — 3331090001 HH PPS REVENUE CREDIT

## 2021-04-09 PROCEDURE — G0299 HHS/HOSPICE OF RN EA 15 MIN: HCPCS

## 2021-04-09 PROCEDURE — 3331090002 HH PPS REVENUE DEBIT

## 2021-04-10 VITALS
SYSTOLIC BLOOD PRESSURE: 120 MMHG | RESPIRATION RATE: 18 BRPM | DIASTOLIC BLOOD PRESSURE: 80 MMHG | HEART RATE: 82 BPM | OXYGEN SATURATION: 95 % | TEMPERATURE: 97 F

## 2021-04-10 PROCEDURE — 3331090002 HH PPS REVENUE DEBIT

## 2021-04-10 PROCEDURE — 3331090001 HH PPS REVENUE CREDIT

## 2021-04-11 PROCEDURE — 3331090001 HH PPS REVENUE CREDIT

## 2021-04-11 PROCEDURE — 3331090002 HH PPS REVENUE DEBIT

## 2021-04-12 ENCOUNTER — HOME CARE VISIT (OUTPATIENT)
Dept: SCHEDULING | Facility: HOME HEALTH | Age: 81
End: 2021-04-12
Payer: COMMERCIAL

## 2021-04-12 VITALS
TEMPERATURE: 97.8 F | SYSTOLIC BLOOD PRESSURE: 110 MMHG | OXYGEN SATURATION: 96 % | DIASTOLIC BLOOD PRESSURE: 60 MMHG | HEART RATE: 61 BPM | RESPIRATION RATE: 18 BRPM

## 2021-04-12 PROCEDURE — G0299 HHS/HOSPICE OF RN EA 15 MIN: HCPCS

## 2021-04-12 PROCEDURE — 3331090002 HH PPS REVENUE DEBIT

## 2021-04-12 PROCEDURE — 3331090001 HH PPS REVENUE CREDIT

## 2021-04-13 ENCOUNTER — HOME CARE VISIT (OUTPATIENT)
Dept: SCHEDULING | Facility: HOME HEALTH | Age: 81
End: 2021-04-13
Payer: COMMERCIAL

## 2021-04-13 VITALS
DIASTOLIC BLOOD PRESSURE: 65 MMHG | RESPIRATION RATE: 17 BRPM | SYSTOLIC BLOOD PRESSURE: 130 MMHG | TEMPERATURE: 98.2 F | HEART RATE: 76 BPM | OXYGEN SATURATION: 96 %

## 2021-04-13 PROCEDURE — 3331090001 HH PPS REVENUE CREDIT

## 2021-04-13 PROCEDURE — G0157 HHC PT ASSISTANT EA 15: HCPCS

## 2021-04-13 PROCEDURE — 3331090002 HH PPS REVENUE DEBIT

## 2021-04-14 ENCOUNTER — HOME CARE VISIT (OUTPATIENT)
Dept: SCHEDULING | Facility: HOME HEALTH | Age: 81
End: 2021-04-14
Payer: COMMERCIAL

## 2021-04-14 VITALS
SYSTOLIC BLOOD PRESSURE: 110 MMHG | TEMPERATURE: 98.6 F | RESPIRATION RATE: 18 BRPM | HEART RATE: 64 BPM | DIASTOLIC BLOOD PRESSURE: 70 MMHG | OXYGEN SATURATION: 98 %

## 2021-04-14 PROCEDURE — G0299 HHS/HOSPICE OF RN EA 15 MIN: HCPCS

## 2021-04-14 PROCEDURE — 3331090001 HH PPS REVENUE CREDIT

## 2021-04-14 PROCEDURE — 3331090002 HH PPS REVENUE DEBIT

## 2021-04-15 ENCOUNTER — HOME CARE VISIT (OUTPATIENT)
Dept: SCHEDULING | Facility: HOME HEALTH | Age: 81
End: 2021-04-15
Payer: COMMERCIAL

## 2021-04-15 VITALS
OXYGEN SATURATION: 95 % | HEART RATE: 86 BPM | DIASTOLIC BLOOD PRESSURE: 80 MMHG | SYSTOLIC BLOOD PRESSURE: 130 MMHG | TEMPERATURE: 97.9 F | RESPIRATION RATE: 17 BRPM

## 2021-04-15 PROCEDURE — 3331090002 HH PPS REVENUE DEBIT

## 2021-04-15 PROCEDURE — 3331090001 HH PPS REVENUE CREDIT

## 2021-04-15 PROCEDURE — G0157 HHC PT ASSISTANT EA 15: HCPCS

## 2021-04-16 ENCOUNTER — HOSPITAL ENCOUNTER (OUTPATIENT)
Dept: WOUND CARE | Age: 81
Discharge: HOME | End: 2021-04-16
Attending: SURGERY
Payer: COMMERCIAL

## 2021-04-16 ENCOUNTER — HOME CARE VISIT (OUTPATIENT)
Dept: SCHEDULING | Facility: HOME HEALTH | Age: 81
End: 2021-04-16
Payer: COMMERCIAL

## 2021-04-16 PROCEDURE — 3331090001 HH PPS REVENUE CREDIT

## 2021-04-16 PROCEDURE — 29580 STRAPPING UNNA BOOT: CPT

## 2021-04-16 PROCEDURE — 3331090002 HH PPS REVENUE DEBIT

## 2021-04-16 NOTE — WOUND CARE
[address] [place] [address] [address], [address] Phone: [phone] Fax: [phone] Patient: Michael Vieyra MRN: [id number]  SSN: [id number] YOB: 1940  Age: 79 y.o. Sex: male Return Appointment: 2 weeks with Joyce Kussmaul, MD 
 
Instructions:  
Bilateral Lower Legs: 
Cleanse wound and periwound with wound cleanser or normal saline. Xeroform- apply to wound bed on Left Lower Leg Wrap with Calamine (or Zinc if Calamine unavailable) aspect of [place]. Cover with ABD at wound bed sites. Wrap with Coban aspect of [place]. Unna boot with wound and lower extremity assessment. If there is any problem with the dressing (too tight, slides down,, etc.)  Patient to return to clinic to have re-wrapped by clinician. Dressing changes 3x weekly. [facility] Next appointment at [address] [facility] in 2 weeks. Please provide wound care M-W-F-M-W, before patient's next appointment. 
  
Elevate lower legs when at rest. 
Float Heels on pillows to relieve heel pressure. Diabetic Diet with goal of Tight Blood Sugars for optimal wound healing. Maintain adequate fluid intake. Maintain adequate protein intake. Reduced Salt Diet. Should you experience increased redness, swelling, pain, foul odor, size of wound(s), or have a temperature over 101 degrees please contact the [address] [place] [address] at [phone] or if after hours contact your primary care physician or go to the hospital emergency department. Signed By: Cher Berrios RN April 16, 2021

## 2021-04-16 NOTE — WOUND CARE
[employer] Below Knee NAME:  Peggy Saxena YOB: 1940 MEDICAL RECORD NUMBER:  [id number] DATE:  4/16/2021 Remove old Unna Boot or Boots. Applied moisturizing agent to dry skin as needed. Appied primary and secondary dressing as ordered. Applied Unna roll from toes to knee overlapping each time. Applied ace wrap or coban from toes to below the knee. Secured with tape and/or metal clips covered with tape. Instructed patient/caregiver to keep dressing dry and intact. DO NOT REMOVE DRESSING. Instructed pt/family/caregiver to report excessive draining, loose bandage, wet dressing, severe pain or tingling in toes. Applied [employer] dressing below the knee to bilateral lower legs. Unna Boot(s) were applied per  Guidelines. Response to treatment: Well tolerated by patient  Electronically signed by Zaire Jones RN on 4/16/2021 at 11:19 AM

## 2021-04-16 NOTE — DISCHARGE INSTRUCTIONS
[address]  [address], [address]   Phone: [phone]  Fax: [phone]    Patient: Jorge Luis Greenfield MRN: [id number]  SSN: [id number]    YOB: 1940  Age: 79 y.o. Sex: male       Return Appointment: 2 weeks with Joselin Rivera MD    Instructions:   Bilateral Lower Legs:  Cleanse wound and periwound with wound cleanser or normal saline. Xeroform- apply to wound bed on Left Lower Leg  Wrap with Calamine (or Zinc if Calamine unavailable) aspect of [place]. Cover with ABD at wound bed sites. Wrap with Cob[place] aspect of [place]. Unna boot with wound and lower extremity assessment. If there is any problem with the dressing (too tight, slides down,, etc.)  Patient to return to clinic to have re-wrapped by clinician. Dressing changes 3x weekly. [facility] for wound care. Next appointment at [address] [facility] in 2 weeks. Please provide wound care M-W-F-M-W, before patient's next appointment.     Elevate lower legs when at rest.  Float Heels on pillows to relieve heel pressure. Diabetic Diet with goal of Tight Blood Sugars for optimal wound healing. Maintain adequate fluid intake. Maintain adequate protein intake. Reduced Salt Diet. Should you experience increased redness, swelling, pain, foul odor, size of wound(s), or have a temperature over 101 degrees please contact the [address] [place] [address] Road at [phone] or if after hours contact your primary care physician or go to the hospital emergency department.     Signed By: Gena Guo RN     April 16, 2021

## 2021-04-17 PROCEDURE — 3331090001 HH PPS REVENUE CREDIT

## 2021-04-17 PROCEDURE — 3331090002 HH PPS REVENUE DEBIT

## 2021-04-18 VITALS
TEMPERATURE: 97.2 F | HEART RATE: 83 BPM | SYSTOLIC BLOOD PRESSURE: 123 MMHG | RESPIRATION RATE: 18 BRPM | DIASTOLIC BLOOD PRESSURE: 91 MMHG | OXYGEN SATURATION: 95 %

## 2021-04-18 PROCEDURE — 3331090001 HH PPS REVENUE CREDIT

## 2021-04-18 PROCEDURE — 3331090002 HH PPS REVENUE DEBIT

## 2021-04-18 NOTE — WOUND CARE
04/16/21 1040 Wound Leg lower Right;Medial #1 04/02/21 Date First Assessed/Time First Assessed: 04/02/21 1200   Present on Hospital Admission: Yes  Wound Approximate Age at First Assessment (Weeks): 6 weeks  Primary Wound Type: Venous Ulcer  Location: Leg lower  Wound Location Orientation: Right;Medial  W... Wound Image Wound Etiology Venous Dressing Status Clean;Dry; Intact Cleansed Cleansed with saline; Soap and water Dressing/Treatment  
[facility] Wound Length (cm) 0 cm Wound Width (cm) 0 cm Wound Depth (cm) 0 cm Wound Surface Area (cm^2) 0 cm^2 Change in Wound Size % 100 Wound Volume (cm^3) 0 cm^3 Wound Healing % 100 Wound Assessment Epithelialization Drainage Amount None Sarah-Wound/Incision Assessment Edematous Wound Leg lower Left; Anterior;Medial;Lateral #2 04/02/21 Date First Assessed/Time First Assessed: 04/02/21 1203   Present on Hospital Admission: Yes  Wound Approximate Age at First Assessment (Weeks): 6 weeks  Primary Wound Type: Venous Ulcer  Location: Leg lower  Wound Location Orientation: Left; Anterior;M. .. Wound Image Wound Etiology Traumatic Dressing Status Clean; Intact; New drainage noted; Old drainage noted Cleansed Cleansed with saline; Soap and water Dressing/Treatment  
[facility] Wound Length (cm) 3 cm Wound Width (cm) 3 cm Wound Depth (cm) 0.1 cm Wound Surface Area (cm^2) 9 cm^2 Change in Wound Size % 96.17 Wound Volume (cm^3) 0.9 cm^3 Wound Healing % 96 Wound Assessment Epithelialization;Granulation tissue Drainage Amount Moderate Drainage Description Serous Wound Odor None Sarah-Wound/Incision Assessment Edematous Edges Attached edges Wound Thickness Description Full thickness Patient is currently on ANTICOAGULANT THERAPY: ASA & Eliquis Wounds gently debrided with gauze and normal saline.

## 2021-04-19 ENCOUNTER — HOME CARE VISIT (OUTPATIENT)
Dept: SCHEDULING | Facility: HOME HEALTH | Age: 81
End: 2021-04-19
Payer: COMMERCIAL

## 2021-04-19 VITALS
RESPIRATION RATE: 18 BRPM | BODY MASS INDEX: 31.51 KG/M2 | OXYGEN SATURATION: 92 % | SYSTOLIC BLOOD PRESSURE: 110 MMHG | TEMPERATURE: 48.2 F | DIASTOLIC BLOOD PRESSURE: 60 MMHG | WEIGHT: 213.4 LBS | HEART RATE: 82 BPM

## 2021-04-19 PROCEDURE — MED10156 SCISSORS,BANDAGE,LISTER,5.5

## 2021-04-19 PROCEDURE — A6456 ZINC PASTE BAND W >=3"<5"/YD: HCPCS

## 2021-04-19 PROCEDURE — G0299 HHS/HOSPICE OF RN EA 15 MIN: HCPCS

## 2021-04-19 PROCEDURE — 3331090002 HH PPS REVENUE DEBIT

## 2021-04-19 PROCEDURE — 3331090001 HH PPS REVENUE CREDIT

## 2021-04-20 ENCOUNTER — HOME CARE VISIT (OUTPATIENT)
Dept: SCHEDULING | Facility: HOME HEALTH | Age: 81
End: 2021-04-20
Payer: COMMERCIAL

## 2021-04-20 VITALS
DIASTOLIC BLOOD PRESSURE: 80 MMHG | SYSTOLIC BLOOD PRESSURE: 130 MMHG | OXYGEN SATURATION: 93 % | TEMPERATURE: 97.8 F | RESPIRATION RATE: 18 BRPM | HEART RATE: 91 BPM

## 2021-04-20 PROCEDURE — 3331090001 HH PPS REVENUE CREDIT

## 2021-04-20 PROCEDURE — 3331090002 HH PPS REVENUE DEBIT

## 2021-04-20 PROCEDURE — G0157 HHC PT ASSISTANT EA 15: HCPCS

## 2021-04-21 ENCOUNTER — HOME CARE VISIT (OUTPATIENT)
Dept: SCHEDULING | Facility: HOME HEALTH | Age: 81
End: 2021-04-21
Payer: COMMERCIAL

## 2021-04-21 VITALS
OXYGEN SATURATION: 94 % | DIASTOLIC BLOOD PRESSURE: 80 MMHG | RESPIRATION RATE: 18 BRPM | HEART RATE: 64 BPM | SYSTOLIC BLOOD PRESSURE: 130 MMHG | TEMPERATURE: 98 F

## 2021-04-21 VITALS
DIASTOLIC BLOOD PRESSURE: 62 MMHG | TEMPERATURE: 98 F | RESPIRATION RATE: 18 BRPM | SYSTOLIC BLOOD PRESSURE: 108 MMHG | HEART RATE: 80 BPM | OXYGEN SATURATION: 97 %

## 2021-04-21 PROCEDURE — 3331090002 HH PPS REVENUE DEBIT

## 2021-04-21 PROCEDURE — G0151 HHCP-SERV OF PT,EA 15 MIN: HCPCS

## 2021-04-21 PROCEDURE — G0299 HHS/HOSPICE OF RN EA 15 MIN: HCPCS

## 2021-04-21 PROCEDURE — 3331090001 HH PPS REVENUE CREDIT

## 2021-04-22 PROCEDURE — 3331090002 HH PPS REVENUE DEBIT

## 2021-04-22 PROCEDURE — 3331090001 HH PPS REVENUE CREDIT

## 2021-04-23 ENCOUNTER — HOME CARE VISIT (OUTPATIENT)
Dept: SCHEDULING | Facility: HOME HEALTH | Age: 81
End: 2021-04-23
Payer: COMMERCIAL

## 2021-04-23 PROCEDURE — 3331090002 HH PPS REVENUE DEBIT

## 2021-04-23 PROCEDURE — 3331090001 HH PPS REVENUE CREDIT

## 2021-04-23 PROCEDURE — G0299 HHS/HOSPICE OF RN EA 15 MIN: HCPCS

## 2021-04-24 PROCEDURE — 3331090001 HH PPS REVENUE CREDIT

## 2021-04-24 PROCEDURE — 3331090002 HH PPS REVENUE DEBIT

## 2021-04-25 VITALS
DIASTOLIC BLOOD PRESSURE: 72 MMHG | SYSTOLIC BLOOD PRESSURE: 138 MMHG | TEMPERATURE: 98.1 F | HEART RATE: 63 BPM | RESPIRATION RATE: 18 BRPM | OXYGEN SATURATION: 93 %

## 2021-04-25 PROCEDURE — 3331090003 HH PPS REVENUE ADJ

## 2021-04-25 PROCEDURE — 3331090001 HH PPS REVENUE CREDIT

## 2021-04-25 PROCEDURE — 3331090002 HH PPS REVENUE DEBIT

## 2021-04-26 ENCOUNTER — HOME CARE VISIT (OUTPATIENT)
Dept: SCHEDULING | Facility: HOME HEALTH | Age: 81
End: 2021-04-26
Payer: COMMERCIAL

## 2021-04-26 VITALS
OXYGEN SATURATION: 93 % | TEMPERATURE: 97.3 F | HEART RATE: 73 BPM | RESPIRATION RATE: 18 BRPM | DIASTOLIC BLOOD PRESSURE: 70 MMHG | SYSTOLIC BLOOD PRESSURE: 110 MMHG

## 2021-04-26 PROCEDURE — 3331090001 HH PPS REVENUE CREDIT

## 2021-04-26 PROCEDURE — 400018 HH-NO PAY CLAIM PROCEDURE

## 2021-04-26 PROCEDURE — G0299 HHS/HOSPICE OF RN EA 15 MIN: HCPCS

## 2021-04-26 PROCEDURE — 400013 HH SOC

## 2021-04-26 PROCEDURE — 3331090002 HH PPS REVENUE DEBIT

## 2021-04-27 PROCEDURE — 3331090001 HH PPS REVENUE CREDIT

## 2021-04-27 PROCEDURE — 3331090002 HH PPS REVENUE DEBIT

## 2021-04-28 ENCOUNTER — HOSPITAL ENCOUNTER (EMERGENCY)
Age: 81
Discharge: HOME | End: 2021-04-28
Attending: STUDENT IN AN ORGANIZED HEALTH CARE EDUCATION/TRAINING PROGRAM
Payer: COMMERCIAL

## 2021-04-28 ENCOUNTER — APPOINTMENT (OUTPATIENT)
Dept: GENERAL RADIOLOGY | Age: 81
End: 2021-04-28
Attending: PHYSICIAN ASSISTANT
Payer: COMMERCIAL

## 2021-04-28 ENCOUNTER — HOME CARE VISIT (OUTPATIENT)
Dept: SCHEDULING | Facility: HOME HEALTH | Age: 81
End: 2021-04-28
Payer: COMMERCIAL

## 2021-04-28 VITALS
TEMPERATURE: 97.7 F | HEIGHT: 72 IN | BODY MASS INDEX: 31.15 KG/M2 | HEART RATE: 89 BPM | WEIGHT: 230 LBS | OXYGEN SATURATION: 93 % | SYSTOLIC BLOOD PRESSURE: 131 MMHG | RESPIRATION RATE: 20 BRPM | DIASTOLIC BLOOD PRESSURE: 94 MMHG

## 2021-04-28 VITALS
HEART RATE: 82 BPM | DIASTOLIC BLOOD PRESSURE: 70 MMHG | TEMPERATURE: 98.1 F | SYSTOLIC BLOOD PRESSURE: 130 MMHG | RESPIRATION RATE: 20 BRPM | OXYGEN SATURATION: 94 %

## 2021-04-28 DIAGNOSIS — M23.92 ACUTE INTERNAL DERANGEMENT OF LEFT KNEE: ICD-10-CM
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
## 2021-04-28 LAB
ALBUMIN SERPL-MCNC: 3.7 G/DL (ref 3.2–4.6)
ALBUMIN/GLOB SERPL: 0.9 {RATIO} (ref 1.2–3.5)
ALP SERPL-CCNC: 216 U/L (ref 50–136)
ALT SERPL-CCNC: 48 U/L (ref 12–65)
ANION GAP SERPL CALC-SCNC: 6 MMOL/L (ref 7–16)
AST SERPL-CCNC: 65 U/L (ref 15–37)
ATRIAL RATE: 220 BPM
BASOPHILS # BLD: 0.1 K/UL (ref 0–0.2)
BASOPHILS NFR BLD: 1 % (ref 0–2)
BILIRUB SERPL-MCNC: 2.7 MG/DL (ref 0.2–1.1)
BUN SERPL-MCNC: 23 MG/DL (ref 8–23)
CALCIUM SERPL-MCNC: 10.7 MG/DL (ref 8.3–10.4)
CALCULATED R AXIS, ECG10: -49 DEGREES
CALCULATED T AXIS, ECG11: -70 DEGREES
CHLORIDE SERPL-SCNC: 105 MMOL/L (ref 98–107)
CO2 SERPL-SCNC: 30 MMOL/L (ref 21–32)
COVID-19 RAPID TEST, COVR: NOT DETECTED
CREAT SERPL-MCNC: 1.65 MG/DL (ref 0.8–1.5)
DIAGNOSIS, 93000: NORMAL
DIFFERENTIAL METHOD BLD: ABNORMAL
EOSINOPHIL # BLD: 0.2 K/UL (ref 0–0.8)
EOSINOPHIL NFR BLD: 4 % (ref 0.5–7.8)
ERYTHROCYTE [DISTWIDTH] IN BLOOD BY AUTOMATED COUNT: 16.7 % (ref 11.9–14.6)
GLOBULIN SER CALC-MCNC: 4.1 G/DL (ref 2.3–3.5)
GLUCOSE SERPL-MCNC: 86 MG/DL (ref 65–100)
HCT VFR BLD AUTO: 42.9 % (ref 41.1–50.3)
HGB BLD-MCNC: 14.1 G/DL (ref 13.6–17.2)
IMM GRANULOCYTES # BLD AUTO: 0 K/UL (ref 0–0.5)
IMM GRANULOCYTES NFR BLD AUTO: 0 % (ref 0–5)
LYMPHOCYTES # BLD: 0.9 K/UL (ref 0.5–4.6)
LYMPHOCYTES NFR BLD: 16 % (ref 13–44)
MCH RBC QN AUTO: 32.5 PG (ref 26.1–32.9)
MCHC RBC AUTO-ENTMCNC: 32.9 G/DL (ref 31.4–35)
MCV RBC AUTO: 98.8 FL (ref 79.6–97.8)
MONOCYTES # BLD: 0.6 K/UL (ref 0.1–1.3)
MONOCYTES NFR BLD: 11 % (ref 4–12)
NEUTS SEG # BLD: 3.9 K/UL (ref 1.7–8.2)
NEUTS SEG NFR BLD: 68 % (ref 43–78)
NRBC # BLD: 0 K/UL (ref 0–0.2)
PLATELET # BLD AUTO: 124 K/UL (ref 150–450)
PMV BLD AUTO: 12.1 FL (ref 9.4–12.3)
POTASSIUM SERPL-SCNC: 3 MMOL/L (ref 3.5–5.1)
PROT SERPL-MCNC: 7.8 G/DL (ref 6.3–8.2)
Q-T INTERVAL, ECG07: 398 MS
QRS DURATION, ECG06: 86 MS
QTC CALCULATION (BEZET), ECG08: 470 MS
RBC # BLD AUTO: 4.34 M/UL (ref 4.23–5.6)
SARS-COV-2, COV2: NORMAL
SODIUM SERPL-SCNC: 141 MMOL/L (ref 138–145)
SOURCE, COVRS: NORMAL
TROPONIN-HIGH SENSITIVITY: 25.2 PG/ML (ref 0–14)
TROPONIN-HIGH SENSITIVITY: 26.5 PG/ML (ref 0–14)
VENTRICULAR RATE, ECG03: 84 BPM
WBC # BLD AUTO: 5.8 K/UL (ref 4.3–11.1)

## 2021-04-28 PROCEDURE — 3331090001 HH PPS REVENUE CREDIT

## 2021-04-28 PROCEDURE — 87635 SARS-COV-2 COVID-19 AMP PRB: CPT

## 2021-04-28 PROCEDURE — U0005 INFEC AGEN DETEC AMPLI PROBE: HCPCS

## 2021-04-28 PROCEDURE — 93005 ELECTROCARDIOGRAM TRACING: CPT | Performed by: PHYSICIAN ASSISTANT

## 2021-04-28 PROCEDURE — 85025 COMPLETE CBC W/AUTO DIFF WBC: CPT

## 2021-04-28 PROCEDURE — G0299 HHS/HOSPICE OF RN EA 15 MIN: HCPCS

## 2021-04-28 PROCEDURE — 3331090002 HH PPS REVENUE DEBIT

## 2021-04-28 PROCEDURE — 81003 URINALYSIS AUTO W/O SCOPE: CPT

## 2021-04-28 PROCEDURE — 99285 EMERGENCY DEPT VISIT HI MDM: CPT

## 2021-04-28 PROCEDURE — 80053 COMPREHEN METABOLIC PANEL: CPT

## 2021-04-28 PROCEDURE — 84484 ASSAY OF TROPONIN QUANT: CPT

## 2021-04-28 PROCEDURE — 71046 X-RAY EXAM CHEST 2 VIEWS: CPT

## 2021-04-28 PROCEDURE — 73562 X-RAY EXAM OF KNEE 3: CPT

## 2021-04-28 NOTE — ED TRIAGE NOTES
Patient reports controlled fall with home health yesterday having left knee pain and instability. Patient states home health clinician caught him and he slid into the dresser. Patient states knee less painful today.  Masked

## 2021-04-28 NOTE — ED PROVIDER NOTES
Patient is here with left knee pain after he states he tripped on something and fell yesterday. It has gotten progressively worse. Home health witnessed the fall. He does not have any open wounds. He does have chronic swelling of his lower legs from venous insufficiency. Patient does live by himself and has been getting progressively weaker according to his cousin who checks on him. Home health does come in and work with him at home. He is a diabetic, has systolic and diastolic congestive heart failure, stage III kidney disease and chronic atrial fibrillation. Patient has no fever. Patient did not hit his head nor did he have any loss of consciousness. No headaches, visual changes, nausea, vomiting, chest pain, shortness of breath, abdominal pain, dizziness, trouble with urination or bowel movements or other new symptoms. The history is provided by the patient. Knee Injury   This is a new problem. The current episode started yesterday. The problem occurs constantly. The problem has been gradually worsening. The pain is present in the left knee. The quality of the pain is described as aching. The pain is at a severity of 2/10. The pain is mild. Associated symptoms include limited range of motion and stiffness. Pertinent negatives include no numbness, no tingling, no itching, no back pain and no neck pain. The symptoms are aggravated by movement, activity and standing. He has tried nothing for the symptoms. There has been a history of trauma.         Past Medical History:   Diagnosis Date    Arrhythmia     Arthritis     Benign essential hypertension 10/27/2016    BPH (benign prostatic hypertrophy) 1/7/2014    BXO (balanitis xerotica obliterans) 10/27/2016    CAD (coronary artery disease)     CHF (congestive heart failure) (HCC) 10/27/2016    Chronic kidney disease     Chronic pain     Diabetes ([facility] [address]     Edema 10/27/2016    HTN (hypertension) 1/7/2014    Hypercholesterolemia 1/7/2014    Hypertensive heart disease without HF (heart failure) 10/27/2016    Hypertrophy of prostate with urinary obstruction and other lower urinary tract symptoms (LUTS) 2014    Hypertrophy of prostate without urinary obstruction and other lower urinary tract symptoms (LUTS) 2014    Osteoarthrosis, unspecified site 10/27/2016    PVD (peripheral vascular disease) ([facility] [address] 10/27/2016    Stroke ([facility] [address]     Urinary frequency 2014       Past Surgical History:   Procedure Laterality Date    HX ORTHOPAEDIC      hip replacement    HX UROLOGICAL      cystoscopy         Family History:   Problem Relation Age of Onset    Heart Disease Father        Social History     Socioeconomic History    Marital status: SINGLE     Spouse name: Not on file    Number of children: Not on file    Years of education: Not on file    Highest education level: Not on file   Occupational History    Not on file   Social Needs    Financial resource strain: Not on file    Food insecurity     Worry: Not on file     Inability: Not on file    Transportation needs     Medical: Not on file     Non-medical: Not on file   Tobacco Use    Smoking status: Former Smoker     Quit date: 2000     Years since quittin.7    Smokeless tobacco: Never Used    Tobacco comment: Continues Cessation   Substance and Sexual Activity    Alcohol use: No    Drug use: No    Sexual activity: Not on file   Lifestyle    Physical activity     Days per week: Not on file     Minutes per session: Not on file    Stress: Not on file   Relationships    Social connections     Talks on phone: Not on file     Gets together: Not on file     Attends Christianity service: Not on file     Active member of club or organization: Not on file     Attends meetings of clubs or organizations: Not on file     Relationship status: Not on file    Intimate partner violence     Fear of current or ex partner: Not on file     Emotionally abused: Not on file     Physically abused: Not on file     Forced sexual activity: Not on file   Other Topics Concern     Service Not Asked    Blood Transfusions Not Asked    Caffeine Concern Not Asked    Occupational Exposure Not Asked    Hobby Hazards Not Asked    Sleep Concern Not Asked    Stress Concern Not Asked    Weight Concern Not Asked    Special Diet Not Asked    Back Care Not Asked    Exercise Not Asked    Bike Helmet Not Asked   [address] Not Asked    Self-Exams Not Asked   Social History Narrative    Not on file         ALLERGIES: Patient has no known allergies. Review of Systems   Constitutional: Negative. HENT: Negative. Eyes: Negative. Respiratory: Negative. Cardiovascular: Negative. Gastrointestinal: Negative. Genitourinary: Negative. Musculoskeletal: Positive for stiffness. Negative for back pain and neck pain. Left knee pain   Skin: Negative. Negative for itching. Neurological: Positive for weakness. Negative for dizziness, tingling, tremors, seizures, syncope, facial asymmetry, speech difficulty, light-headedness, numbness and headaches. Psychiatric/Behavioral: Negative. All other systems reviewed and are negative. Vitals:    04/28/21 1553   BP: (!) 131/94   Pulse: 89   Resp: 20   Temp: 97.7 °F (36.5 °C)   SpO2: 93%   Weight: 104.3 kg (230 lb)   Height: 6' (1.829 m)            Physical Exam  Vitals signs and nursing note reviewed. Constitutional:       Appearance: Normal appearance. He is well-developed. HENT:      Head: Normocephalic and atraumatic. Right Ear: External ear normal.      Left Ear: External ear normal.      Nose: Nose normal.   Eyes:      Conjunctiva/sclera: Conjunctivae normal.      Pupils: Pupils are equal, round, and reactive to light. Neck:      Musculoskeletal: Normal range of motion and neck supple. Cardiovascular:      Rate and Rhythm: Normal rate. Pulses: Normal pulses.    Pulmonary:      Effort: Pulmonary effort is normal. Abdominal:      Palpations: Abdomen is soft. Musculoskeletal:         General: Swelling, tenderness and signs of injury present. No deformity. Legs:    Skin:     General: Skin is warm and dry. Neurological:      Mental Status: He is alert and oriented to person, place, and time. Deep Tendon Reflexes: Reflexes are normal and symmetric. Psychiatric:         Behavior: Behavior normal.         Thought Content: Thought content normal.         Judgment: Judgment normal.          MDM  Number of Diagnoses or Management Options  Diagnosis management comments: EK:10:27 Atrial Fibrillation, Ventricular rate 84 bpm, left axis deviation Dr. Rosa Elena Garza reviewed the EKG and EKG from 10/23/20 to compare. Amount and/or Complexity of Data Reviewed  Clinical lab tests: ordered and reviewed  Tests in the radiology section of CPT®: ordered and reviewed  Review and summarize past medical records: yes  Discuss the patient with other providers: yes (Dr. Rosa Elena Garza)    Risk of Complications, Morbidity, and/or Mortality  Presenting problems: moderate  Diagnostic procedures: moderate  Management options: moderate           Procedures      5:13 PM Spoke with Dr. Rosa Elena Garza regarding patient. We discussed the history, physical exam and work up.     6:10 PM Spoke again with Dr. Rosa Elena Garza regarding patient. Patient is oriented to person, place and time and would like to go home if possible. 2 hour troponin ordered. 8:57 PM second troponin is essentially unchanged from the prior 1. Patient has a history of atrial fibrillation, congestive heart failure, peripheral edema, weakness and fell yesterday. He does have a primary care physician, a home health nurse, a wound nurse and does not want admission to the hospital at all. He wants to stay in his home and is of sound mind to make that decision here today. His work-up is essentially negative other than the arthritis in his knee.   We will apply an Ace wrap for comfort and he does have a walker that he can use to help him ambulate. He should rest, ice, elevate and avoid painful activities. He is stable for discharge at this time. The patient was observed in the ED. Results Reviewed:      Recent Results (from the past 24 hour(s))   EKG, 12 LEAD, INITIAL    Collection Time: 04/28/21  5:10 PM   Result Value Ref Range    Ventricular Rate 84 BPM    Atrial Rate 220 BPM    QRS Duration 86 ms    Q-T Interval 398 ms    QTC Calculation (Bezet) 470 ms    Calculated R Axis -49 degrees    Calculated T Axis -70 degrees    Diagnosis       Atrial fibrillation  Left axis deviation  Poor R wave progression  Abnormal ECG  When compared with ECG of 28-JAN-2017 13:06,  QRS axis Shifted left  Nonspecific T wave abnormality, worse in Lateral leads  Confirmed by Alice Grover MD (), ANNE ([id number]) on 4/28/2021 5:51:38 PM     CBC WITH AUTOMATED DIFF    Collection Time: 04/28/21  5:21 PM   Result Value Ref Range    WBC 5.8 4.3 - 11.1 K/uL    RBC 4.34 4.23 - 5.6 M/uL    HGB 14.1 13.6 - 17.2 g/dL    HCT 42.9 41.1 - 50.3 %    MCV 98.8 (H) 79.6 - 97.8 FL    MCH 32.5 26.1 - 32.9 PG    MCHC 32.9 31.4 - 35.0 g/dL    RDW 16.7 (H) 11.9 - 14.6 %    PLATELET 782 (L) 305 - 450 K/uL    MPV 12.1 9.4 - 12.3 FL    ABSOLUTE NRBC 0.00 0.0 - 0.2 K/uL    DF AUTOMATED      NEUTROPHILS 68 43 - 78 %    LYMPHOCYTES 16 13 - 44 %    MONOCYTES 11 4.0 - 12.0 %    EOSINOPHILS 4 0.5 - 7.8 %    BASOPHILS 1 0.0 - 2.0 %    IMMATURE GRANULOCYTES 0 0.0 - 5.0 %    ABS. NEUTROPHILS 3.9 1.7 - 8.2 K/UL    ABS. LYMPHOCYTES 0.9 0.5 - 4.6 K/UL    ABS. MONOCYTES 0.6 0.1 - 1.3 K/UL    ABS. EOSINOPHILS 0.2 0.0 - 0.8 K/UL    ABS. BASOPHILS 0.1 0.0 - 0.2 K/UL    ABS. IMM.  GRANS. 0.0 0.0 - 0.5 K/UL   METABOLIC PANEL, COMPREHENSIVE    Collection Time: 04/28/21  5:21 PM   Result Value Ref Range    Sodium 141 138 - 145 mmol/L    Potassium 3.0 (L) 3.5 - 5.1 mmol/L    Chloride 105 98 - 107 mmol/L    CO2 30 21 - 32 mmol/L    Anion gap 6 (L) 7 - 16 mmol/L Glucose 86 65 - 100 mg/dL    BUN 23 8 - 23 MG/DL    Creatinine 1.65 (H) 0.8 - 1.5 MG/DL    GFR est AA 52 (L) >60 ml/min/1.73m2    GFR est non-AA 43 (L) >60 ml/min/1.73m2    Calcium 10.7 (H) 8.3 - 10.4 MG/DL    Bilirubin, total 2.7 (H) 0.2 - 1.1 MG/DL    ALT (SGPT) 48 12 - 65 U/L    AST (SGOT) 65 (H) 15 - 37 U/L    Alk. phosphatase 216 (H) 50 - 136 U/L    Protein, total 7.8 6.3 - 8.2 g/dL    Albumin 3.7 3.2 - 4.6 g/dL    Globulin 4.1 (H) 2.3 - 3.5 g/dL    A-G Ratio 0.9 (L) 1.2 - 3.5     TROPONIN-HIGH SENSITIVITY    Collection Time: 04/28/21  5:21 PM   Result Value Ref Range    Troponin-High Sensitivity 25.2 (H) 0 - 14 pg/mL   SARS-COV-2    Collection Time: 04/28/21  5:21 PM   Result Value Ref Range    SARS-CoV-2 Please find results under separate order     COVID-19 RAPID TEST    Collection Time: 04/28/21  5:21 PM   Result Value Ref Range    Specimen source Nasopharyngeal      COVID-19 rapid test Not detected NOTD     TROPONIN-HIGH SENSITIVITY    Collection Time: 04/28/21  7:52 PM   Result Value Ref Range    Troponin-High Sensitivity 26.5 (H) 0 - 14 pg/mL     XR CHEST PA LAT   Final Result   No acute airspace disease. 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 I discussed the results of all labs, procedures, radiographs, and treatments with the patient and available family. Treatment plan is agreed upon and the patient is ready for discharge. All voiced understanding of the discharge plan and medication instructions or changes as appropriate. Questions about treatment in the ED were answered. All were encouraged to return should symptoms worsen or new problems develop.

## 2021-04-28 NOTE — PROGRESS NOTES
Wound Center Progress Note    Patient: Lesley Curling MRN: [id number]  SSN: [id number]    YOB: 1940  Age: 79 y.o. Sex: male      Subjective:     Chief Complaint:    Left lower leg posterior ulcer    History of Present Illness:       Wound Caused By: pressure and swelling  Associated Signs and Symptoms: some drainage  Timing: Has been present intermittently for several months  Quality: full thickness  Severity wound  Modifying Factors: DM2, PVD and CAD          Past Medical History:   Diagnosis Date    Arrhythmia     Arthritis     Benign essential hypertension 10/27/2016    BPH (benign prostatic hypertrophy) 2014    BXO (balanitis xerotica obliterans) 10/27/2016    CAD (coronary artery disease)     CHF (congestive heart failure) ([facility] 10/27/2016    Chronic kidney disease     Chronic pain     Diabetes ([facility]     Edema 10/27/2016    HTN (hypertension) 2014    Hypercholesterolemia 2014    Hypertensive heart disease without HF (heart failure) 10/27/2016    Hypertrophy of prostate with urinary obstruction and other lower urinary tract symptoms (LUTS) 2014    Hypertrophy of prostate without urinary obstruction and other lower urinary tract symptoms (LUTS) 2014    Osteoarthrosis, unspecified site 10/27/2016    PVD (peripheral vascular disease) ([facility] 10/27/2016    Stroke ([facility]     Urinary frequency 2014      Past Surgical History:   Procedure Laterality Date    HX ORTHOPAEDIC      hip replacement    HX UROLOGICAL      cystoscopy     Family History   Problem Relation Age of Onset    Heart Disease Father       Social History     Tobacco Use    Smoking status: Former Smoker     Quit date: 2000     Years since quittin.7    Smokeless tobacco: Never Used    Tobacco comment: Continues Cessation   Substance Use Topics    Alcohol use: No       Prior to Admission medications    Medication Sig Start Date End Date Taking?  Authorizing Provider atorvastatin (LIPITOR) 80 mg tablet TAKE 1 TABLET BY MOUTH NIGHTLY 3/29/21   Fantasma Belle MD   allopurinoL (ZYLOPRIM) 300 mg tablet TAKE 1 TABLET BY MOUTH DAILY 3/29/21   Fantasma Belle MD   metoprolol tartrate (LOPRESSOR) 50 mg tablet TAKE 1 TABLET BY MOUTH TWICE DAILY 3/29/21   Fantasma Belle MD   spironolactone (ALDACTONE) 25 mg tablet TAKE 1 TABLET BY MOUTH DAILY 3/29/21   Fantasma Belle MD   acetaminophen (TYLENOL) 500 mg tablet Take 500 mg by mouth every six (6) hours as needed for Pain. Provider, Historical   finasteride (PROSCAR) 5 mg tablet TAKE 1 TABLET BY MOUTH DAILY 1/8/21   Fantasma Belle MD   diclofenac (VOLTAREN) 1 % gel 2-4 g daily on affected areas as needed for pain cheaper over-the-counter or with good Rx if not covered by insurance, 12/21/20   Fantasma Belle MD   metFORMIN (GLUCOPHAGE) 500 mg tablet TAKE ONE TWICE A DAY  Patient taking differently: Take 500 mg by mouth. TAKE ONE TWICE A DAY 6/11/20   Fantasma Belle MD   multivitamin (ONE A DAY) tablet Take 1 Tab by mouth daily. 6/11/20   Aleyda Belle MD   losartan-hydroCHLOROthiazide (HYZAAR) 50-12.5 mg per tablet Take 1 Tab by mouth daily. 6/11/20   Aleyda Belle MD   apixaban (ELIQUIS) 5 mg tablet Take 1 Tab by mouth two (2) times a day. 6/11/20   Fantasma Belle MD   furosemide (LASIX) 20 mg tablet Take 1 Tab by mouth daily. 6/11/20   Aleyda Belle MD   sildenafil, antihypertensive, (REVATIO) 20 mg tablet 2 daily as needed for erectile dysfunction 6/12/19   Aleyda Belle MD   aspirin delayed-release 81 mg tablet 2 daily  Patient taking differently: Take 162 mg by mouth daily.  6/12/19   Amando Belle MD   colchicine 0.6 mg tablet 1 bid prn as directed 4/20/18   Aleyda Belle MD   traMADol (ULTRAM) 50 mg tablet 1 twice a day as needed with OTC acetaminophen 500 mg 4/20/18   Fantasma Belle MD   selenium sulfide 2.25 % sham Apply to scalp twice a week and wash after 10 minutes 1/23/18 Fantasma Belle MD     No Known Allergies     Review of Systems:  CONSTITUTIONAL: No fever, chills  HEAD: No headache  EYES: No visual loss  ENT: No hearing loss  SKIN: No rash  CARDIOVASCULAR: No chest pain  RESPIRATORY: No shortness of breath  GASTROINTESTINAL: No nausea, vomiting  GENITOURINARY: No excessive urination  NEUROLOGICAL: some weakness  MUSCULOSKELETAL: No muscle pain. No neck pain  HEMATOLOGIC: No easy bleeding  LYMPHATICS: + lymphedema. PSYCHIATRIC: No current depression  ENDOCRINOLOGIC: + high sugars  ALLERGIES: No history of asthma, hives, eczema or rhinitis. Lab Results   Component Value Date/Time    Hemoglobin A1c 5.4 01/15/2021 12:34 PM    Hemoglobin A1c, External 5.7 10/17/2016        Immunization History   Administered Date(s) Administered    Influenza Vaccine 10/17/2016    Influenza Vaccine (Quad) Mdck Pf (>4 Yrs Flucelvax QUAD 88122) 10/09/2017    Influenza Vaccine ShiftPlanning) PF (>6 Mo Flulaval, Fluarix, and >3 Yrs Afluria, Fluzone 40480) 09/12/2019, 09/08/2020    TB Skin Test (PPD) Intradermal 07/23/2014, 12/05/2016       Body mass index is 29.98 kg/m². Current medications:  Current Outpatient Medications   Medication Sig Dispense Refill    atorvastatin (LIPITOR) 80 mg tablet TAKE 1 TABLET BY MOUTH NIGHTLY 90 Tab 5    allopurinoL (ZYLOPRIM) 300 mg tablet TAKE 1 TABLET BY MOUTH DAILY 90 Tab 5    metoprolol tartrate (LOPRESSOR) 50 mg tablet TAKE 1 TABLET BY MOUTH TWICE DAILY 180 Tab 5    spironolactone (ALDACTONE) 25 mg tablet TAKE 1 TABLET BY MOUTH DAILY 90 Tab 5    acetaminophen (TYLENOL) 500 mg tablet Take 500 mg by mouth every six (6) hours as needed for Pain.       finasteride (PROSCAR) 5 mg tablet TAKE 1 TABLET BY MOUTH DAILY 90 Tab 4    diclofenac (VOLTAREN) 1 % gel 2-4 g daily on affected areas as needed for pain cheaper over-the-counter or with good Rx if not covered by insurance, 100 g 5    metFORMIN (GLUCOPHAGE) 500 mg tablet TAKE ONE TWICE A DAY (Patient taking differently: Take 500 mg by mouth. TAKE ONE TWICE A DAY) 180 Tab 5    multivitamin (ONE A DAY) tablet Take 1 Tab by mouth daily. 90 Tab 5    losartan-hydroCHLOROthiazide (HYZAAR) 50-12.5 mg per tablet Take 1 Tab by mouth daily. 90 Tab 5    apixaban (ELIQUIS) 5 mg tablet Take 1 Tab by mouth two (2) times a day. 90 Tab 5    furosemide (LASIX) 20 mg tablet Take 1 Tab by mouth daily. 90 Tab 5    sildenafil, antihypertensive, (REVATIO) 20 mg tablet 2 daily as needed for erectile dysfunction 30 Tab 12    aspirin delayed-release 81 mg tablet 2 daily (Patient taking differently: Take 162 mg by mouth daily. ) 180 Tab 5    colchicine 0.6 mg tablet 1 bid prn as directed 60 Tab 1    traMADol (ULTRAM) 50 mg tablet 1 twice a day as needed with OTC acetaminophen 500 mg 60 Tab 0    selenium sulfide 2.25 % sham Apply to scalp twice a week and wash after 10 minutes 1 Bottle 3         Objective:     Physical Exam:     Visit Vitals  /81 (BP 1 Location: Left upper arm)   Pulse 76   Temp 98.7 °F (37.1 °C)   Resp 20   Ht 5' 9\" (1.753 m)   Wt 92.1 kg (203 lb)   SpO2 93%   BMI 29.98 kg/m²       General: well developed, well nourished  Psych: cooperative. Pleasant  Neuro: alert and oriented to person/place/situation. Otherwise nonfocal.  Derm: Normal turgor for age, dry skin  HEENT: Normocephalic, atraumatic. EOMI. Neck: Normal range of motion. Chest: Respirations nonlabored  Cardio: RRR  Abdomen: Soft, nondistended  Lower extremities:   + hemosiderrosis  + significant varicosities  Capillary refill > 2 sec    biphasic dp/pt    Right  1+ DP/PT    Left  1+ DP/PT                Ulcer Description:   Wound Leg lower Left;Posterior 03/27/21 (Active)   Number of days: 32       [REMOVED] Wound Leg lower Left;Posterior #1 02/05/21 (Removed)   Wound Image   03/05/21 1104   Wound Etiology Venous 03/05/21 1104   Dressing Status Clean;Dry; Intact 02/19/21 1115   Cleansed Soap and water 02/19/21 1115   Dressing/Treatment Open to air 03/05/21 1104   Wound Length (cm) 0 cm 03/05/21 1104   Wound Width (cm) 0 cm 03/05/21 1104   Wound Depth (cm) 0 cm 03/05/21 1104   Wound Surface Area (cm^2) 0 cm^2 03/05/21 1104   Change in Wound Size % 100 03/05/21 1104   Wound Volume (cm^3) 0 cm^3 03/05/21 1104   Wound Healing % 100 03/05/21 1104   Wound Assessment Epithelialization 03/05/21 1104   Drainage Amount None 03/05/21 1104   Drainage Description Serous 02/12/21 1453   Wound Odor None 03/05/21 1104   Sarah-Wound/Incision Assessment Edematous 02/12/21 1453   Wound Thickness Description Full thickness 02/12/21 1453   Number of days: 28       [REMOVED] Wound Leg lower Right;Medial #1 04/02/21 (Removed)   Wound Image   04/16/21 1040   Wound Etiology Venous 04/16/21 1040   Dressing Status Clean;Dry; Intact 04/16/21 1040   Cleansed Cleansed with saline; Soap and water 04/16/21 1040   Wound Length (cm) 0 cm 04/16/21 1040   Wound Width (cm) 0 cm 04/16/21 1040   Wound Depth (cm) 0 cm 04/16/21 1040   Wound Surface Area (cm^2) 0 cm^2 04/16/21 1040   Change in Wound Size % 100 04/16/21 1040   Wound Volume (cm^3) 0 cm^3 04/16/21 1040   Wound Healing % 100 04/16/21 1040   Wound Assessment Epithelialization 04/16/21 1040   Drainage Amount None 04/16/21 1040   Drainage Description Serosanguinous 04/02/21 1209   Wound Odor None 04/02/21 1209   Sarah-Wound/Incision Assessment Edematous 04/16/21 1040   Edges Attached edges 04/02/21 1209   Wound Thickness Description Full thickness 04/02/21 1209   Number of days: 21       [REMOVED] Wound Leg lower Left; Anterior;Medial;Lateral #2 04/02/21 (Removed)   Wound Image    04/16/21 1040   Wound Etiology Traumatic 04/16/21 1040   Dressing Status Clean; Intact; New drainage noted; Old drainage noted 04/16/21 1040   Cleansed Cleansed with saline; Soap and water 04/16/21 1040   Wound Length (cm) 3 cm 04/16/21 1040   Wound Width (cm) 3 cm 04/16/21 1040   Wound Depth (cm) 0.1 cm 04/16/21 1040   Wound Surface Area (cm^2) 9 cm^2 04/16/21 1040 Change in Wound Size % 96.17 04/16/21 1040   Wound Volume (cm^3) 0.9 cm^3 04/16/21 1040   Wound Healing % 96 04/16/21 1040   Wound Assessment Epithelialization;Granulation tissue 04/16/21 1040   Drainage Amount Moderate 04/16/21 1040   Drainage Description Serous 04/16/21 1040   Wound Odor None 04/16/21 1040   Sarah-Wound/Incision Assessment Edematous 04/16/21 1040   Edges Attached edges 04/16/21 1040   Wound Thickness Description Full thickness 04/16/21 1040   Number of days: 21       [REMOVED] Wound Leg lower Anterior;Right 03/27/21 (Removed)   Number of days: 27           Problem List  Date Reviewed: 12/21/2020          Codes Class Noted    Longstanding persistent atrial fibrillation (HCC) ICD-10-CM: I48.11  ICD-9-CM: 427.31  6/18/2020        Diabetic nephropathy associated with type 2 diabetes mellitus ([facility] ICD-10-CM: E11.21  ICD-9-CM: 250.40, 583.81  6/18/2020        Stage 3 chronic kidney disease ([facility] ICD-10-CM: N18.30  ICD-9-CM: 585.3  6/18/2020        Class 1 obesity due to excess calories with serious comorbidity and body mass index (BMI) of 32.0 to 32.9 in adult ICD-10-CM: E66.09, Z68.32  ICD-9-CM: 278.00, V85.32  8/12/2019        Systemic hypertensive disorder complication BFV-53-GA: H86  ICD-9-CM: 401.9  6/12/2019        Leg swelling ICD-10-CM: M79.89  ICD-9-CM: 729.81  6/12/2019        Paroxysmal atrial fibrillation (HCC) ICD-10-CM: I48.0  ICD-9-CM: 427.31  6/12/2019        Type 2 diabetes mellitus with nephropathy ([facility] ICD-10-CM: E11.21  ICD-9-CM: 250.40, 583.81  1/23/2018        CVA (cerebral vascular accident) [facility]) ICD-10-CM: I63.9  ICD-9-CM: 434.91  12/4/2016        Edema ICD-10-CM: R60.9  ICD-9-CM: 782.3  10/27/2016        PVD (peripheral vascular disease) ([facility] ICD-10-CM: I73.9  ICD-9-CM: 443.9  10/27/2016        Osteoarthrosis, unspecified site ICD-10-CM: M19.90  ICD-9-CM: 715.90  10/27/2016        CHF (congestive heart failure) ([facility] ICD-10-CM: I50.9  ICD-9-CM: 428.0  10/27/2016 Diabetes mellitus type II, non insulin dependent (HCC) (Chronic) ICD-10-CM: E11.9  ICD-9-CM: 250.00  7/23/2014        Hyperlipidemia (Chronic) ICD-10-CM: E78.5  ICD-9-CM: 272.4  7/23/2014        ED (erectile dysfunction) (Chronic) ICD-10-CM: N52.9  ICD-9-CM: 607.84  7/23/2014        Urinary frequency ICD-10-CM: R35.0  ICD-9-CM: 788.41  1/7/2014        Benign prostatic hyperplasia with urinary hesitancy ICD-10-CM: N40.1, R39.11  ICD-9-CM: 600.01, 788.64  1/7/2014                    Assessment/Plan:       LLE venous wound with pressure contributing    TIME:  If total time for today's E/M service is used for level of service it is documented below. This time includes physician non-face-to-face service time visit on the date of service and includes    Preparing to see the patient (eg, review of tests)  Obtaining and/or reviewing separately obtained history  Performing a medically necessary appropriate examination and/or evaluation  Counseling and educating the patient/family/caregiver  Ordering medications, tests, or procedures  Referring and communicating with other health care professionals as needed  Documenting clinical information in the electronic or other health record  Independently interpreting results (not reported separately) and communicating results to the patient/family/caregiver  Care coordination (not reported separately)     E/M time =   23       minutes.     Signed By: Sahra Boyd MD

## 2021-04-29 ENCOUNTER — HOME CARE VISIT (OUTPATIENT)
Dept: SCHEDULING | Facility: HOME HEALTH | Age: 81
End: 2021-04-29
Payer: COMMERCIAL

## 2021-04-29 LAB
SARS-COV-2, COV2: NOT DETECTED
SPECIMEN SOURCE, FCOV2M: NORMAL

## 2021-04-29 PROCEDURE — 3331090001 HH PPS REVENUE CREDIT

## 2021-04-29 PROCEDURE — 3331090002 HH PPS REVENUE DEBIT

## 2021-04-29 NOTE — DISCHARGE INSTRUCTIONS
Rest, ice, elevate, avoid painful activities. ED if worse. Follow up with Ortho for recheck. Get up and down slowly. Follow up with your PCP for recheck.

## 2021-04-30 ENCOUNTER — HOME CARE VISIT (OUTPATIENT)
Dept: SCHEDULING | Facility: HOME HEALTH | Age: 81
End: 2021-04-30
Payer: COMMERCIAL

## 2021-04-30 PROCEDURE — 3331090002 HH PPS REVENUE DEBIT

## 2021-04-30 PROCEDURE — 3331090001 HH PPS REVENUE CREDIT

## 2021-04-30 PROCEDURE — G0299 HHS/HOSPICE OF RN EA 15 MIN: HCPCS

## 2021-05-01 VITALS
HEART RATE: 81 BPM | TEMPERATURE: 98.4 F | DIASTOLIC BLOOD PRESSURE: 80 MMHG | OXYGEN SATURATION: 93 % | SYSTOLIC BLOOD PRESSURE: 140 MMHG | RESPIRATION RATE: 18 BRPM

## 2021-05-01 PROCEDURE — 3331090002 HH PPS REVENUE DEBIT

## 2021-05-01 PROCEDURE — 3331090001 HH PPS REVENUE CREDIT

## 2021-05-02 PROCEDURE — 3331090001 HH PPS REVENUE CREDIT

## 2021-05-02 PROCEDURE — 3331090002 HH PPS REVENUE DEBIT

## 2021-05-03 ENCOUNTER — HOME CARE VISIT (OUTPATIENT)
Dept: SCHEDULING | Facility: HOME HEALTH | Age: 81
End: 2021-05-03
Payer: COMMERCIAL

## 2021-05-03 ENCOUNTER — APPOINTMENT (OUTPATIENT)
Dept: CT IMAGING | Age: 81
DRG: 177 | End: 2021-05-03
Attending: EMERGENCY MEDICINE
Payer: COMMERCIAL

## 2021-05-03 ENCOUNTER — HOSPITAL ENCOUNTER (INPATIENT)
Age: 81
LOS: 7 days | Discharge: NURSING, REHAB OR LONG-TERM CARE | DRG: 177 | End: 2021-05-10
Attending: EMERGENCY MEDICINE | Admitting: INTERNAL MEDICINE
Payer: COMMERCIAL

## 2021-05-03 ENCOUNTER — APPOINTMENT (OUTPATIENT)
Dept: GENERAL RADIOLOGY | Age: 81
DRG: 177 | End: 2021-05-03
Attending: EMERGENCY MEDICINE
Payer: COMMERCIAL

## 2021-05-03 DIAGNOSIS — R41.82 ALTERED MENTAL STATUS, UNSPECIFIED ALTERED MENTAL STATUS TYPE: Primary | ICD-10-CM

## 2021-05-03 DIAGNOSIS — I27.20 PULMONARY HYPERTENSION (HCC): ICD-10-CM

## 2021-05-03 DIAGNOSIS — I48.0 PAROXYSMAL ATRIAL FIBRILLATION (HCC): ICD-10-CM

## 2021-05-03 DIAGNOSIS — Z86.73 HISTORY OF CVA (CEREBROVASCULAR ACCIDENT): ICD-10-CM

## 2021-05-03 DIAGNOSIS — J18.9 PNEUMONIA DUE TO INFECTIOUS ORGANISM, UNSPECIFIED LATERALITY, UNSPECIFIED PART OF LUNG: ICD-10-CM

## 2021-05-03 DIAGNOSIS — R09.02 HYPOXEMIA: ICD-10-CM

## 2021-05-03 DIAGNOSIS — L97.919 ULCERS OF BOTH LOWER LEGS (HCC): ICD-10-CM

## 2021-05-03 DIAGNOSIS — J96.01 ACUTE RESPIRATORY FAILURE WITH HYPOXIA (HCC): ICD-10-CM

## 2021-05-03 DIAGNOSIS — I48.21 PERMANENT ATRIAL FIBRILLATION (HCC): ICD-10-CM

## 2021-05-03 DIAGNOSIS — I10 HYPERTENSION, UNSPECIFIED TYPE: ICD-10-CM

## 2021-05-03 DIAGNOSIS — I50.9 CONGESTIVE HEART FAILURE, UNSPECIFIED HF CHRONICITY, UNSPECIFIED HEART FAILURE TYPE (HCC): ICD-10-CM

## 2021-05-03 DIAGNOSIS — L97.929 ULCERS OF BOTH LOWER LEGS (HCC): ICD-10-CM

## 2021-05-03 PROBLEM — J96.91 RESPIRATORY FAILURE WITH HYPOXIA (HCC): Status: ACTIVE | Noted: 2021-05-03

## 2021-05-03 PROBLEM — R44.3 HALLUCINATIONS: Status: ACTIVE | Noted: 2021-05-03

## 2021-05-03 PROBLEM — G93.41 ACUTE METABOLIC ENCEPHALOPATHY: Status: ACTIVE | Noted: 2021-05-03

## 2021-05-03 LAB
ALBUMIN SERPL-MCNC: 3.6 G/DL (ref 3.2–4.6)
ALBUMIN/GLOB SERPL: 0.8 {RATIO} (ref 1.2–3.5)
ALP SERPL-CCNC: 191 U/L (ref 50–136)
ALT SERPL-CCNC: 40 U/L (ref 12–65)
ANION GAP SERPL CALC-SCNC: 9 MMOL/L (ref 7–16)
APPEARANCE UR: CLEAR
ARTERIAL PATENCY WRIST A: POSITIVE
AST SERPL-CCNC: 82 U/L (ref 15–37)
ATRIAL RATE: 312 BPM
BACTERIA URNS QL MICRO: 0 /HPF
BASE EXCESS BLD CALC-SCNC: 2.4 MMOL/L
BASOPHILS # BLD: 0.1 K/UL (ref 0–0.2)
BASOPHILS NFR BLD: 1 % (ref 0–2)
BDY SITE: ABNORMAL
BILIRUB SERPL-MCNC: 2.6 MG/DL (ref 0.2–1.1)
BILIRUB UR QL: ABNORMAL
BNP SERPL-MCNC: 2274 PG/ML
BUN SERPL-MCNC: 25 MG/DL (ref 8–23)
CALCIUM SERPL-MCNC: 10.8 MG/DL (ref 8.3–10.4)
CALCULATED R AXIS, ECG10: -49 DEGREES
CALCULATED T AXIS, ECG11: -52 DEGREES
CASTS URNS QL MICRO: ABNORMAL /LPF
CHLORIDE SERPL-SCNC: 107 MMOL/L (ref 98–107)
CO2 SERPL-SCNC: 26 MMOL/L (ref 21–32)
COLOR UR: ABNORMAL
CREAT SERPL-MCNC: 1.41 MG/DL (ref 0.8–1.5)
DIAGNOSIS, 93000: NORMAL
DIFFERENTIAL METHOD BLD: ABNORMAL
EOSINOPHIL # BLD: 0.3 K/UL (ref 0–0.8)
EOSINOPHIL NFR BLD: 5 % (ref 0.5–7.8)
EPI CELLS #/AREA URNS HPF: ABNORMAL /HPF
ERYTHROCYTE [DISTWIDTH] IN BLOOD BY AUTOMATED COUNT: 17.9 % (ref 11.9–14.6)
GAS FLOW.O2 O2 DELIVERY SYS: ABNORMAL L/MIN
GLOBULIN SER CALC-MCNC: 4.3 G/DL (ref 2.3–3.5)
GLUCOSE BLD STRIP.AUTO-MCNC: 122 MG/DL (ref 65–100)
GLUCOSE BLD STRIP.AUTO-MCNC: 56 MG/DL (ref 65–100)
GLUCOSE BLD STRIP.AUTO-MCNC: 69 MG/DL (ref 65–100)
GLUCOSE BLD STRIP.AUTO-MCNC: 81 MG/DL (ref 65–100)
GLUCOSE SERPL-MCNC: 70 MG/DL (ref 65–100)
GLUCOSE UR STRIP.AUTO-MCNC: NEGATIVE MG/DL
HCO3 BLD-SCNC: 26 MMOL/L (ref 22–26)
HCT VFR BLD AUTO: 41.5 % (ref 41.1–50.3)
HGB BLD-MCNC: 13.6 G/DL (ref 13.6–17.2)
HGB UR QL STRIP: NEGATIVE
IMM GRANULOCYTES # BLD AUTO: 0 K/UL (ref 0–0.5)
IMM GRANULOCYTES NFR BLD AUTO: 0 % (ref 0–5)
KETONES UR QL STRIP.AUTO: NEGATIVE MG/DL
LACTATE SERPL-SCNC: 1.1 MMOL/L (ref 0.4–2)
LEUKOCYTE ESTERASE UR QL STRIP.AUTO: NEGATIVE
LYMPHOCYTES # BLD: 1.1 K/UL (ref 0.5–4.6)
LYMPHOCYTES NFR BLD: 19 % (ref 13–44)
MCH RBC QN AUTO: 32.5 PG (ref 26.1–32.9)
MCHC RBC AUTO-ENTMCNC: 32.8 G/DL (ref 31.4–35)
MCV RBC AUTO: 99.3 FL (ref 79.6–97.8)
MONOCYTES # BLD: 0.5 K/UL (ref 0.1–1.3)
MONOCYTES NFR BLD: 9 % (ref 4–12)
NEUTS SEG # BLD: 3.7 K/UL (ref 1.7–8.2)
NEUTS SEG NFR BLD: 66 % (ref 43–78)
NITRITE UR QL STRIP.AUTO: NEGATIVE
NRBC # BLD: 0 K/UL (ref 0–0.2)
PCO2 BLD: 36.2 MMHG (ref 35–45)
PH BLD: 7.47 [PH] (ref 7.35–7.45)
PH UR STRIP: 6.5 [PH] (ref 5–9)
PLATELET # BLD AUTO: 132 K/UL (ref 150–450)
PMV BLD AUTO: 12.2 FL (ref 9.4–12.3)
PO2 BLD: 57 MMHG (ref 75–100)
POTASSIUM SERPL-SCNC: 4.4 MMOL/L (ref 3.5–5.1)
PROT SERPL-MCNC: 7.9 G/DL (ref 6.3–8.2)
PROT UR STRIP-MCNC: 30 MG/DL
Q-T INTERVAL, ECG07: 394 MS
QRS DURATION, ECG06: 88 MS
QTC CALCULATION (BEZET), ECG08: 462 MS
RBC # BLD AUTO: 4.18 M/UL (ref 4.23–5.6)
RBC #/AREA URNS HPF: ABNORMAL /HPF
SAO2 % BLD: 90.9 % (ref 95–98)
SERVICE CMNT-IMP: ABNORMAL
SERVICE CMNT-IMP: NORMAL
SERVICE CMNT-IMP: NORMAL
SODIUM SERPL-SCNC: 142 MMOL/L (ref 136–145)
SP GR UR REFRACTOMETRY: 1.02 (ref 1–1.02)
SPECIMEN TYPE: ABNORMAL
TROPONIN-HIGH SENSITIVITY: 28.7 PG/ML (ref 0–14)
TROPONIN-HIGH SENSITIVITY: 29.3 PG/ML (ref 0–14)
UROBILINOGEN UR QL STRIP.AUTO: 2 EU/DL (ref 0.2–1)
VENTRICULAR RATE, ECG03: 83 BPM
WBC # BLD AUTO: 5.6 K/UL (ref 4.3–11.1)
WBC URNS QL MICRO: ABNORMAL /HPF

## 2021-05-03 PROCEDURE — 65270000029 HC RM PRIVATE

## 2021-05-03 PROCEDURE — 83605 ASSAY OF LACTIC ACID: CPT

## 2021-05-03 PROCEDURE — 74011250636 HC RX REV CODE- 250/636: Performed by: EMERGENCY MEDICINE

## 2021-05-03 PROCEDURE — 96374 THER/PROPH/DIAG INJ IV PUSH: CPT

## 2021-05-03 PROCEDURE — 74011250636 HC RX REV CODE- 250/636: Performed by: INTERNAL MEDICINE

## 2021-05-03 PROCEDURE — 74011000250 HC RX REV CODE- 250: Performed by: INTERNAL MEDICINE

## 2021-05-03 PROCEDURE — 71045 X-RAY EXAM CHEST 1 VIEW: CPT

## 2021-05-03 PROCEDURE — 36415 COLL VENOUS BLD VENIPUNCTURE: CPT

## 2021-05-03 PROCEDURE — 93005 ELECTROCARDIOGRAM TRACING: CPT | Performed by: EMERGENCY MEDICINE

## 2021-05-03 PROCEDURE — C8929 TTE W OR WO FOL WCON,DOPPLER: HCPCS

## 2021-05-03 PROCEDURE — 80053 COMPREHEN METABOLIC PANEL: CPT

## 2021-05-03 PROCEDURE — 82962 GLUCOSE BLOOD TEST: CPT

## 2021-05-03 PROCEDURE — 82803 BLOOD GASES ANY COMBINATION: CPT

## 2021-05-03 PROCEDURE — 36600 WITHDRAWAL OF ARTERIAL BLOOD: CPT

## 2021-05-03 PROCEDURE — 87040 BLOOD CULTURE FOR BACTERIA: CPT

## 2021-05-03 PROCEDURE — 74011250637 HC RX REV CODE- 250/637: Performed by: INTERNAL MEDICINE

## 2021-05-03 PROCEDURE — 83880 ASSAY OF NATRIURETIC PEPTIDE: CPT

## 2021-05-03 PROCEDURE — 81001 URINALYSIS AUTO W/SCOPE: CPT

## 2021-05-03 PROCEDURE — 87086 URINE CULTURE/COLONY COUNT: CPT

## 2021-05-03 PROCEDURE — 94760 N-INVAS EAR/PLS OXIMETRY 1: CPT

## 2021-05-03 PROCEDURE — 84484 ASSAY OF TROPONIN QUANT: CPT

## 2021-05-03 PROCEDURE — 77030041247 HC PROTECTOR HEEL HEELMEDIX MDII -B

## 2021-05-03 PROCEDURE — 2709999900 HC NON-CHARGEABLE SUPPLY

## 2021-05-03 PROCEDURE — 74011000302 HC RX REV CODE- 302: Performed by: INTERNAL MEDICINE

## 2021-05-03 PROCEDURE — 99285 EMERGENCY DEPT VISIT HI MDM: CPT

## 2021-05-03 PROCEDURE — 85025 COMPLETE CBC W/AUTO DIFF WBC: CPT

## 2021-05-03 PROCEDURE — 70450 CT HEAD/BRAIN W/O DYE: CPT

## 2021-05-03 PROCEDURE — 3331090001 HH PPS REVENUE CREDIT

## 2021-05-03 PROCEDURE — 86580 TB INTRADERMAL TEST: CPT | Performed by: INTERNAL MEDICINE

## 2021-05-03 PROCEDURE — 3331090002 HH PPS REVENUE DEBIT

## 2021-05-03 RX ORDER — ALLOPURINOL 300 MG/1
300 TABLET ORAL DAILY
Status: DISCONTINUED | OUTPATIENT
Start: 2021-05-04 | End: 2021-05-10 | Stop reason: HOSPADM

## 2021-05-03 RX ORDER — FUROSEMIDE 10 MG/ML
60 INJECTION INTRAMUSCULAR; INTRAVENOUS
Status: COMPLETED | OUTPATIENT
Start: 2021-05-03 | End: 2021-05-03

## 2021-05-03 RX ORDER — METOPROLOL TARTRATE 25 MG/1
25 TABLET, FILM COATED ORAL 2 TIMES DAILY
Status: DISCONTINUED | OUTPATIENT
Start: 2021-05-03 | End: 2021-05-10 | Stop reason: HOSPADM

## 2021-05-03 RX ORDER — ATORVASTATIN CALCIUM 80 MG/1
80 TABLET, FILM COATED ORAL
Status: DISCONTINUED | OUTPATIENT
Start: 2021-05-03 | End: 2021-05-10 | Stop reason: HOSPADM

## 2021-05-03 RX ORDER — FINASTERIDE 5 MG/1
5 TABLET, FILM COATED ORAL DAILY
Status: DISCONTINUED | OUTPATIENT
Start: 2021-05-04 | End: 2021-05-10 | Stop reason: HOSPADM

## 2021-05-03 RX ORDER — ASPIRIN 81 MG/1
162 TABLET ORAL DAILY
Status: DISCONTINUED | OUTPATIENT
Start: 2021-05-03 | End: 2021-05-05

## 2021-05-03 RX ORDER — LISINOPRIL 5 MG/1
5 TABLET ORAL DAILY
Status: DISCONTINUED | OUTPATIENT
Start: 2021-05-04 | End: 2021-05-10 | Stop reason: HOSPADM

## 2021-05-03 RX ORDER — DOXYCYCLINE 100 MG/1
100 CAPSULE ORAL EVERY 12 HOURS
Status: COMPLETED | OUTPATIENT
Start: 2021-05-03 | End: 2021-05-07

## 2021-05-03 RX ORDER — FUROSEMIDE 10 MG/ML
40 INJECTION INTRAMUSCULAR; INTRAVENOUS EVERY 12 HOURS
Status: COMPLETED | OUTPATIENT
Start: 2021-05-03 | End: 2021-05-03

## 2021-05-03 RX ORDER — INSULIN LISPRO 100 [IU]/ML
INJECTION, SOLUTION INTRAVENOUS; SUBCUTANEOUS
Status: DISCONTINUED | OUTPATIENT
Start: 2021-05-03 | End: 2021-05-10 | Stop reason: HOSPADM

## 2021-05-03 RX ADMIN — APIXABAN 5 MG: 5 TABLET, FILM COATED ORAL at 22:07

## 2021-05-03 RX ADMIN — DOXYCYCLINE HYCLATE 100 MG: 100 CAPSULE ORAL at 22:07

## 2021-05-03 RX ADMIN — TUBERCULIN PURIFIED PROTEIN DERIVATIVE 5 UNITS: 5 INJECTION, SOLUTION INTRADERMAL at 13:50

## 2021-05-03 RX ADMIN — APIXABAN 5 MG: 5 TABLET, FILM COATED ORAL at 13:49

## 2021-05-03 RX ADMIN — FUROSEMIDE 40 MG: 10 INJECTION, SOLUTION INTRAMUSCULAR; INTRAVENOUS at 22:07

## 2021-05-03 RX ADMIN — ATORVASTATIN CALCIUM 80 MG: 80 TABLET, FILM COATED ORAL at 22:07

## 2021-05-03 RX ADMIN — METOPROLOL TARTRATE 25 MG: 25 TABLET, FILM COATED ORAL at 13:49

## 2021-05-03 RX ADMIN — FUROSEMIDE 40 MG: 10 INJECTION, SOLUTION INTRAMUSCULAR; INTRAVENOUS at 13:49

## 2021-05-03 RX ADMIN — ASPIRIN 162 MG: 81 TABLET ORAL at 13:49

## 2021-05-03 RX ADMIN — PERFLUTREN 1 ML: 6.52 INJECTION, SUSPENSION INTRAVENOUS at 14:30

## 2021-05-03 RX ADMIN — FUROSEMIDE 60 MG: 10 INJECTION, SOLUTION INTRAMUSCULAR; INTRAVENOUS at 09:57

## 2021-05-03 RX ADMIN — DOXYCYCLINE HYCLATE 100 MG: 100 CAPSULE ORAL at 13:49

## 2021-05-03 NOTE — PROGRESS NOTES
Dual skin assessment completed with Senait Spangler RN. Patient with large bruise noted to left scapula, sacrum intact, Left lower leg with two open areas, one on under side of calf measuring approximately 1in by 1/2 in wide, other on shin pea sized. Both covered with xeroform gauzed and wrapped with kerlix, Right lower leg with two pea sized open areas on inner part on shin, both covered with xerform gauzed and wrapped with kerlix. Bilateral feet with large bunions, left great toenail is thick. Bilateral heels with protective boots applied. Wound consult in place. Will continue to monitor.

## 2021-05-03 NOTE — ED TRIAGE NOTES
Patient arrives from home, mask in place via EMS. EMS reports they were called for Patient having \"hallucinations\" per EMS patient was stating there were people in his home/bahroom this AM. Patient only complained of weakness to EMS denies pain. Patient SPO2 89% on room air.

## 2021-05-03 NOTE — PROGRESS NOTES
TRANSFER - IN REPORT:    Verbal report received from SOPHIE Muniz(name) on Phani Celeste  being received from ED(unit) for routine progression of care      Report consisted of patients Situation, Background, Assessment and   Recommendations(SBAR). Information from the following report(s) SBAR, Kardex, ED Summary, Intake/Output, MAR and Recent Results was reviewed with the receiving nurse. Opportunity for questions and clarification was provided. Assessment completed upon patients arrival to unit and care assumed.

## 2021-05-03 NOTE — ED NOTES
TRANSFER - OUT REPORT:    Verbal report given to April(name) on Michael Vieyra  being transferred to [facility](unit) for routine progression of care       Report consisted of patients Situation, Background, Assessment and   Recommendations(SBAR). Information from the following report(s) SBAR was reviewed with the receiving nurse. Lines:   Peripheral IV 05/03/21 Left Hand (Active)   Site Assessment Clean, dry, & intact 05/03/21 0811   Phlebitis Assessment 0 05/03/21 0811   Infiltration Assessment 0 05/03/21 0811   Dressing Status Clean, dry, & intact 05/03/21 7695        Opportunity for questions and clarification was provided.       Patient transported with:   [employer]

## 2021-05-03 NOTE — ED PROVIDER NOTES
Patient arrives to the emergency room by EMS from home with a history of hallucinations. History is obtained from the patient's cousin as he lives alone but she apparently \"watches out for him\". She states that he has had a general decline in health over the past several weeks and has been noting some abnormal behaviors. That he is not been cleaning up after himself primarily this morning she states he called her on the phone stating that there were people in the house in his bathroom and keeping him from getting dressed. When she arrived at the home the patient had the bathroom barricaded and there were no other occupants of the house. She states she is also noted that he seems to have been leaning to the left for several days and has fallen twice. Patient has history of COPD diabetes and CHF and was noted to be hypoxic on presentation to the emergency department with a room air saturation of 85%. The patient is not on oxygen at home. Family member also reports that she noted that his urine was very dark as he had not flushed the toilet. The history is provided by a relative and medical records. Altered mental status   This is a new problem. The current episode started 3 to 5 hours ago. The problem has been resolved. Associated symptoms include confusion and hallucinations. Pertinent negatives include no somnolence, no seizures, no unresponsiveness, no weakness, no agitation, no delusions, no self-injury, no violence, no tingling and no numbness. Risk factors: elderly, lives alone. His past medical history is significant for diabetes, hypertension, COPD and heart disease. His past medical history does not include seizures, liver disease, CVA, TIA, depression, dementia, psychotropic medication treatment or head trauma.         Past Medical History:   Diagnosis Date    Arrhythmia     Arthritis     Benign essential hypertension 10/27/2016    BPH (benign prostatic hypertrophy) 1/7/2014    BXO (balanitis xerotica obliterans) 10/27/2016    CAD (coronary artery disease)     CHF (congestive heart failure) ([facility] 10/27/2016    Chronic kidney disease     Chronic pain     Diabetes ([facility]     Edema 10/27/2016    HTN (hypertension) 2014    Hypercholesterolemia 2014    Hypertensive heart disease without HF (heart failure) 10/27/2016    Hypertrophy of prostate with urinary obstruction and other lower urinary tract symptoms (LUTS) 2014    Hypertrophy of prostate without urinary obstruction and other lower urinary tract symptoms (LUTS) 2014    Osteoarthrosis, unspecified site 10/27/2016    PVD (peripheral vascular disease) ([facility] 10/27/2016    Stroke ([facility]     Urinary frequency 2014       Past Surgical History:   Procedure Laterality Date    HX ORTHOPAEDIC      hip replacement    HX UROLOGICAL      cystoscopy         Family History:   Problem Relation Age of Onset    Heart Disease Father        Social History     Socioeconomic History    Marital status: SINGLE     Spouse name: Not on file    Number of children: Not on file    Years of education: Not on file    Highest education level: Not on file   Occupational History    Not on file   Social Needs    Financial resource strain: Not on file    Food insecurity     Worry: Not on file     Inability: Not on file    Transportation needs     Medical: Not on file     Non-medical: Not on file   Tobacco Use    Smoking status: Former Smoker     Quit date: 2000     Years since quittin.7    Smokeless tobacco: Never Used    Tobacco comment: Continues Cessation   Substance and Sexual Activity    Alcohol use: No    Drug use: No    Sexual activity: Not on file   Lifestyle    Physical activity     Days per week: Not on file     Minutes per session: Not on file    Stress: Not on file   Relationships    Social connections     Talks on phone: Not on file     Gets together: Not on file     Attends Worship service: Not on file Active member of club or organization: Not on file     Attends meetings of clubs or organizations: Not on file     Relationship status: Not on file    Intimate partner violence     Fear of current or ex partner: Not on file     Emotionally abused: Not on file     Physically abused: Not on file     Forced sexual activity: Not on file   Other Topics Concern     Service Not Asked    Blood Transfusions Not Asked    Caffeine Concern Not Asked    Occupational Exposure Not Asked   [employer] Hazards Not Asked    Sleep Concern Not Asked    Stress Concern Not Asked    Weight Concern Not Asked    Special Diet Not Asked    Back Care Not Asked    Exercise Not Asked    Bike Helmet Not Asked   [address] Not Asked    Self-Exams Not Asked   Social History Narrative    Not on file         ALLERGIES: Patient has no known allergies. Review of Systems   Constitutional: Negative for chills and fever. Neurological: Negative for tingling, seizures, weakness and numbness. Psychiatric/Behavioral: Positive for confusion and hallucinations. Negative for agitation and self-injury. All other systems reviewed and are negative. Vitals:    05/03/21 0800 05/03/21 0807   BP: (!) 144/101    Pulse: 89    Resp: 22    Temp: 98.1 °F (36.7 °C)    SpO2: (!) 85% 99%   Weight: 104.3 kg (230 lb)    Height: 6' (1.829 m)             Physical Exam  Vitals signs and nursing note reviewed. Constitutional:       General: He is not in acute distress. Appearance: Normal appearance. He is not ill-appearing, toxic-appearing or diaphoretic. HENT:      Head: Normocephalic and atraumatic. Nose: Nose normal.      Mouth/Throat:      Mouth: Mucous membranes are moist.      Pharynx: Oropharynx is clear. Eyes:      Extraocular Movements: Extraocular movements intact. Conjunctiva/sclera: Conjunctivae normal.      Pupils: Pupils are equal, round, and reactive to light.    Neck:      Musculoskeletal: Normal range of motion and neck supple. No neck rigidity or muscular tenderness. Comments: No JVD  Cardiovascular:      Rate and Rhythm: Normal rate and regular rhythm. Pulses: Normal pulses. Heart sounds: Murmur present. Pulmonary:      Effort: Pulmonary effort is normal.      Breath sounds: Rales present. Comments: Bibasilar rales present  Abdominal:      General: There is no distension. Palpations: Abdomen is soft. Tenderness: There is no abdominal tenderness. There is no right CVA tenderness, left CVA tenderness, guarding or rebound. Musculoskeletal: Normal range of motion. Right lower leg: Edema present. Left lower leg: Edema present. Comments: Chronic lower extremity edema is present with compressive bandage applied bilaterally   Lymphadenopathy:      Cervical: No cervical adenopathy. Skin:     General: Skin is warm and dry. Capillary Refill: Capillary refill takes less than 2 seconds. Neurological:      General: No focal deficit present. Mental Status: He is alert and oriented to person, place, and time. Mental status is at baseline. Psychiatric:         Mood and Affect: Mood normal.         Behavior: Behavior normal.         Thought Content: Thought content normal.          MDM  Number of Diagnoses or Management Options  Diagnosis management comments: Patient presenting with hallucinations and altered mental status and hypoxemia. Also history that may represent CVA and/or dehydration/urinary tract infection. Most likely requiring admission.        Amount and/or Complexity of Data Reviewed  Clinical lab tests: ordered and reviewed  Tests in the radiology section of CPT®: ordered and reviewed  Review and summarize past medical records: yes  Discuss the patient with other providers: yes  Independent visualization of images, tracings, or specimens: yes (EKG at 8:07 AM: Is atrial fibrillation with a rate of 83, left anterior fascicular block,  no acute ischemic changes)    Risk of Complications, Morbidity, and/or Mortality  Presenting problems: high  Diagnostic procedures: moderate  Management options: moderate    Patient Progress  Patient progress: stable         Procedures

## 2021-05-03 NOTE — PROGRESS NOTES
Initial visit in the ER, a spiritual presence, emotional presence and prayer were provided for the patient and family member.        Marcela Flores, [address] [facility], [facility]

## 2021-05-03 NOTE — PROGRESS NOTES
Problem: Falls - Risk of  Goal: *Absence of Falls  Description: Document Zack Larsonavan Fall Risk and appropriate interventions in the flowsheet.   Outcome: Progressing Towards Goal  Note: Fall Risk Interventions:  Mobility Interventions: Bed/chair exit alarm, Patient to call before getting OOB         Medication Interventions: Bed/chair exit alarm, Patient to call before getting OOB, Teach patient to arise slowly    Elimination Interventions: Bed/chair exit alarm, Call light in reach    History of Falls Interventions: Bed/chair exit alarm

## 2021-05-03 NOTE — PROGRESS NOTES
CM spoke with patient at bedside and with his cousin, Azar Guerrero ([phone]), by phone. Demographics verified. Patient lives alone in a senior apartment. Azar Guerrero has been his primary caregiver since the death of his spouse 2 years ago; she assists with transportation and groceries. Per Azar Guerrero, lately patient has been unable to manage his personal care, ADLs, etc in the home. Patient is current with [facility] and [facility] has been assisting with trying to get patient approved for [facility] aides. Azar Guerrero is interested in finding SNF placement for patient. CM discussed this with patient and he is agreeable. She requests referrals to facilities in [place] that accept patient's insurance. Referrals sent to  [facility]. PT/OT evals are pending. PPD has been ordered; order placed for COVID. CM will follow to assist with discharge needs.     Care Management Interventions  PCP Verified by CM: Yes(Dr. Armstrong July)  Mode of Transport at Discharge: Self  Transition of Care Consult (CM Consult): Discharge Planning  Discharge Durable Medical Equipment: No  Physical Therapy Consult: Yes  Occupational Therapy Consult: Yes  Current Support Network: Own Home, Lives Alone  Confirm Follow Up Transport: Family  Discharge Location  Discharge Placement: Skilled nursing facility

## 2021-05-04 ENCOUNTER — APPOINTMENT (OUTPATIENT)
Dept: MRI IMAGING | Age: 81
DRG: 177 | End: 2021-05-04
Attending: INTERNAL MEDICINE
Payer: COMMERCIAL

## 2021-05-04 PROBLEM — J96.01 ACUTE RESPIRATORY FAILURE WITH HYPOXIA (HCC): Status: ACTIVE | Noted: 2021-05-03

## 2021-05-04 PROBLEM — R44.3 HALLUCINATIONS: Status: RESOLVED | Noted: 2021-05-03 | Resolved: 2021-05-04

## 2021-05-04 PROBLEM — J15.5 PNEUMONIA DUE TO ESCHERICHIA COLI (HCC): Status: ACTIVE | Noted: 2021-05-03

## 2021-05-04 LAB
BASOPHILS # BLD: 0.1 K/UL (ref 0–0.2)
BASOPHILS NFR BLD: 1 % (ref 0–2)
COVID-19 RAPID TEST, COVR: NOT DETECTED
DIFFERENTIAL METHOD BLD: ABNORMAL
EOSINOPHIL # BLD: 0.3 K/UL (ref 0–0.8)
EOSINOPHIL NFR BLD: 5 % (ref 0.5–7.8)
ERYTHROCYTE [DISTWIDTH] IN BLOOD BY AUTOMATED COUNT: 17.1 % (ref 11.9–14.6)
GLUCOSE BLD STRIP.AUTO-MCNC: 100 MG/DL (ref 65–100)
GLUCOSE BLD STRIP.AUTO-MCNC: 100 MG/DL (ref 65–100)
GLUCOSE BLD STRIP.AUTO-MCNC: 86 MG/DL (ref 65–100)
GLUCOSE BLD STRIP.AUTO-MCNC: 86 MG/DL (ref 65–100)
HCT VFR BLD AUTO: 42 % (ref 41.1–50.3)
HGB BLD-MCNC: 14 G/DL (ref 13.6–17.2)
IMM GRANULOCYTES # BLD AUTO: 0 K/UL (ref 0–0.5)
IMM GRANULOCYTES NFR BLD AUTO: 0 % (ref 0–5)
LYMPHOCYTES # BLD: 0.9 K/UL (ref 0.5–4.6)
LYMPHOCYTES NFR BLD: 14 % (ref 13–44)
MCH RBC QN AUTO: 32.6 PG (ref 26.1–32.9)
MCHC RBC AUTO-ENTMCNC: 33.3 G/DL (ref 31.4–35)
MCV RBC AUTO: 97.7 FL (ref 79.6–97.8)
MM INDURATION POC: 0 MM (ref 0–5)
MONOCYTES # BLD: 0.6 K/UL (ref 0.1–1.3)
MONOCYTES NFR BLD: 9 % (ref 4–12)
NEUTS SEG # BLD: 4.6 K/UL (ref 1.7–8.2)
NEUTS SEG NFR BLD: 71 % (ref 43–78)
NRBC # BLD: 0 K/UL (ref 0–0.2)
PLATELET # BLD AUTO: 130 K/UL (ref 150–450)
PMV BLD AUTO: 12.4 FL (ref 9.4–12.3)
PPD POC: NEGATIVE NEGATIVE
RBC # BLD AUTO: 4.3 M/UL (ref 4.23–5.6)
SARS-COV-2, COV2: NORMAL
SERVICE CMNT-IMP: NORMAL
SOURCE, COVRS: NORMAL
WBC # BLD AUTO: 6.5 K/UL (ref 4.3–11.1)

## 2021-05-04 PROCEDURE — 3331090001 HH PPS REVENUE CREDIT

## 2021-05-04 PROCEDURE — 82962 GLUCOSE BLOOD TEST: CPT

## 2021-05-04 PROCEDURE — 87635 SARS-COV-2 COVID-19 AMP PRB: CPT

## 2021-05-04 PROCEDURE — U0005 INFEC AGEN DETEC AMPLI PROBE: HCPCS

## 2021-05-04 PROCEDURE — 74011250637 HC RX REV CODE- 250/637: Performed by: INTERNAL MEDICINE

## 2021-05-04 PROCEDURE — 94760 N-INVAS EAR/PLS OXIMETRY 1: CPT

## 2021-05-04 PROCEDURE — 97165 OT EVAL LOW COMPLEX 30 MIN: CPT

## 2021-05-04 PROCEDURE — 29581 APPL MULTLAYER CMPRN SYS LEG: CPT

## 2021-05-04 PROCEDURE — 97112 NEUROMUSCULAR REEDUCATION: CPT

## 2021-05-04 PROCEDURE — 3331090002 HH PPS REVENUE DEBIT

## 2021-05-04 PROCEDURE — 70551 MRI BRAIN STEM W/O DYE: CPT

## 2021-05-04 PROCEDURE — 36415 COLL VENOUS BLD VENIPUNCTURE: CPT

## 2021-05-04 PROCEDURE — 77010033678 HC OXYGEN DAILY

## 2021-05-04 PROCEDURE — 2W1MX6Z COMPRESSION OF LEFT LOWER EXTREMITY USING PRESSURE DRESSING: ICD-10-PCS | Performed by: INTERNAL MEDICINE

## 2021-05-04 PROCEDURE — 99222 1ST HOSP IP/OBS MODERATE 55: CPT | Performed by: INTERNAL MEDICINE

## 2021-05-04 PROCEDURE — 97162 PT EVAL MOD COMPLEX 30 MIN: CPT

## 2021-05-04 PROCEDURE — 85025 COMPLETE CBC W/AUTO DIFF WBC: CPT

## 2021-05-04 PROCEDURE — 2W1LX6Z COMPRESSION OF RIGHT LOWER EXTREMITY USING PRESSURE DRESSING: ICD-10-PCS | Performed by: INTERNAL MEDICINE

## 2021-05-04 PROCEDURE — 97530 THERAPEUTIC ACTIVITIES: CPT

## 2021-05-04 PROCEDURE — 65270000029 HC RM PRIVATE

## 2021-05-04 PROCEDURE — 2709999900 HC NON-CHARGEABLE SUPPLY

## 2021-05-04 RX ADMIN — METOPROLOL TARTRATE 25 MG: 25 TABLET, FILM COATED ORAL at 08:55

## 2021-05-04 RX ADMIN — ALLOPURINOL 300 MG: 300 TABLET ORAL at 08:54

## 2021-05-04 RX ADMIN — DOXYCYCLINE HYCLATE 100 MG: 100 CAPSULE ORAL at 08:54

## 2021-05-04 RX ADMIN — FINASTERIDE 5 MG: 5 TABLET, FILM COATED ORAL at 08:54

## 2021-05-04 RX ADMIN — DOXYCYCLINE HYCLATE 100 MG: 100 CAPSULE ORAL at 23:08

## 2021-05-04 RX ADMIN — ATORVASTATIN CALCIUM 80 MG: 80 TABLET, FILM COATED ORAL at 21:51

## 2021-05-04 RX ADMIN — APIXABAN 5 MG: 5 TABLET, FILM COATED ORAL at 08:54

## 2021-05-04 RX ADMIN — APIXABAN 5 MG: 5 TABLET, FILM COATED ORAL at 21:51

## 2021-05-04 RX ADMIN — ASPIRIN 162 MG: 81 TABLET ORAL at 08:53

## 2021-05-04 RX ADMIN — LISINOPRIL 5 MG: 5 TABLET ORAL at 08:54

## 2021-05-04 NOTE — ROUTINE PROCESS
IP consult to Cardiac Rehab. Pt EF=50-55% on Echo completed 5/3/21. EF must be 35% or less to qualify for Cardiac Rehab with diagnosis of chronic heart failure. We will contact the patient if a qualifying referral is received.      Thank you,  ADAN ContrerasN, RN  Cardiac Rehab Nurse Liaison

## 2021-05-04 NOTE — WOUND CARE
Patient known to outpatient wound clinic with 3 times per week venous stasis ulcer wraps, multilayer compression. Bilateral leg warm, trace edema DP pulse palpable. Right leg with 2 open areas on anterior surface. Left leg with 2 open areas one anterior, 1 posterior. Will use xeroform, Abd Kerlix and coban 3 times per week in acute care should return to wound clinic after discharge. Will monitor.

## 2021-05-04 NOTE — H&P
[facility] Cardiology Initial Cardiac Evaluation      Date of  Admission: 5/3/2021  7:59 AM     Primary Care Physician: Dr Morteza Black  Primary Cardiologist: Dr Sola Daniel  Referring Physician: Ramsey Starks, CHF Coordinator   Supervising Physician: Dr Emilia Collier    CC/Reason for evaluation: CHF per coordinator     HPI:  Nabil De León is a 79 y.o. male with PMH of CVA, moderate to severe pulmonary HTN, paroxysmal atrial fibrillation/flutter on Eliquis, HTN, HLD, DM type 2,  COPD, and history of nicotine use quit in 2000 who presented to [facility] ED via EMS with hallucinations. Upon evaluation in ED, pt was hypoxic with oxygen saturation of 85%. Labs showed WBC 5.6, H/H 13.6/41., Ptl 132, Na 142, K 4.4, BUN/Cr 25/1.41, lactic acid 1.1, hs trop 29-28, pBNP 2274. CXR showed  new mild left basilar airspace changes and small left basilar effusion. Patient was admitted to hospitalist service for Acute hypoxic respiratory failure, acute metabolic encephalopathy, and concern for possible stroke. Pt was given IV Lasix 60 mg in ED and started on IV lasix 40 mg x 2 doses. Pt had ECHO that showed low normal EF 50-55%, right ventricle was dilated, and RVSP 90-95 mmHg, moderate to severe tricuspid regurgitation. Cardiology consulted for CHF. Pt admits to SOB. States SOB is his normal baseline.      Past Medical History:   Diagnosis Date    Arrhythmia     Arthritis     Benign essential hypertension 10/27/2016    BPH (benign prostatic hypertrophy) 1/7/2014    BXO (balanitis xerotica obliterans) 10/27/2016    CAD (coronary artery disease)     CHF (congestive heart failure) (HCC) 10/27/2016    Chronic kidney disease     Chronic pain     Diabetes ([address]     Edema 10/27/2016    HTN (hypertension) 1/7/2014    Hypercholesterolemia 1/7/2014    Hypertensive heart disease without HF (heart failure) 10/27/2016    Hypertrophy of prostate with urinary obstruction and other lower urinary tract symptoms (LUTS) 1/7/2014    Hypertrophy of prostate without urinary obstruction and other lower urinary tract symptoms (LUTS) 2014    Osteoarthrosis, unspecified site 10/27/2016    PVD (peripheral vascular disease) ([facility] 10/27/2016    Stroke ([facility]     Urinary frequency 2014      Past Surgical History:   Procedure Laterality Date    HX ORTHOPAEDIC      hip replacement    HX UROLOGICAL      cystoscopy       No Known Allergies   Social History     Socioeconomic History    Marital status: SINGLE     Spouse name: Not on file    Number of children: Not on file    Years of education: Not on file    Highest education level: Not on file   Occupational History    Not on file   Social Needs    Financial resource strain: Not on file    Food insecurity     Worry: Not on file     Inability: Not on file    Transportation needs     Medical: Not on file     Non-medical: Not on file   Tobacco Use    Smoking status: Former Smoker     Quit date: 2000     Years since quittin.7    Smokeless tobacco: Never Used    Tobacco comment: Continues Cessation   Substance and Sexual Activity    Alcohol use: No    Drug use: No    Sexual activity: Not on file   Lifestyle    Physical activity     Days per week: Not on file     Minutes per session: Not on file    Stress: Not on file   Relationships    Social connections     Talks on phone: Not on file     Gets together: Not on file     Attends Buddhist service: Not on file     Active member of club or organization: Not on file     Attends meetings of clubs or organizations: Not on file     Relationship status: Not on file    Intimate partner violence     Fear of current or ex partner: Not on file     Emotionally abused: Not on file     Physically abused: Not on file     Forced sexual activity: Not on file   Other Topics Concern     Service Not Asked    Blood Transfusions Not Asked    Caffeine Concern Not Asked    Occupational Exposure Not Asked    Hobby Hazards Not Asked    Sleep Concern Not Asked    Stress Concern Not Asked    Weight Concern Not Asked    Special Diet Not Asked    Back Care Not Asked    Exercise Not Asked    Bike Helmet Not Asked   [address] [facility] Not Asked    Self-Exams Not Asked   Social History Narrative    Not on file     Family History   Problem Relation Age of Onset    Heart Disease Father         Current Facility-Administered Medications   Medication Dose Route Frequency    lisinopriL (PRINIVIL, ZESTRIL) tablet 5 mg  5 mg Oral DAILY    doxycycline (VIBRAMYCIN) capsule 100 mg  100 mg Oral Q12H    apixaban (ELIQUIS) tablet 5 mg  5 mg Oral Q12H    allopurinoL (ZYLOPRIM) tablet 300 mg  300 mg Oral DAILY    aspirin delayed-release tablet 162 mg  162 mg Oral DAILY    finasteride (PROSCAR) tablet 5 mg  5 mg Oral DAILY    metoprolol tartrate (LOPRESSOR) tablet 25 mg  25 mg Oral BID    atorvastatin (LIPITOR) tablet 80 mg  80 mg Oral QHS    insulin lispro (HUMALOG) injection   SubCUTAneous AC&HS    tuberculin injection 5 Units  5 Units IntraDERMal ONCE       Review of Symptoms:    General: No weight changes, weakness, fever or chills  Skin: no rashes, lumps, or other skin changes  HEENT: no headache, dizziness, lightheadedness, vision changes, hearing changes, tinnitus, vertigo, sinus pressure/pain, bleeding gums, sore throat, or hoarseness  Neck: no swollen glands, goiter, pain or stiffness  Respiratory: Positive for dyspnea.  No cough, sputum, hemoptysis, wheezing  Cardiovascular: + as per HPI  Gastrointestinal: no GERD, constipation, diarrhea, liver problems, or h/o GI bleed  Urinary: no frequency, urgency , hematuria, burning/pain with urination, recent flank pain, polyuria, nocturia, or difficulty urinating  Peripheral Vascular: no claudication, leg cramps, prior DVTs, swelling of calves, legs, or feet, color change, or swelling with redness or tenderness  Musculoskeletal: no muscle or joint pain/stiffness, joint swelling, erythema of joints, or back pain  Psychiatric: no depression or excessive stress  Neurological: no sensory or motor loss, seizures, syncope, tremors, numbness, no dementia  Hematologic: no anemia, easy bruising or bleeding  Endocrine: Positive for diabetes. No thyroid problems, heat or cold intolerance, excessive sweating, polyuria, polydipsia       Subjective:     Physical Exam:    Vitals:    05/04/21 0726 05/04/21 0800 05/04/21 1048 05/04/21 1107   BP: (!) 149/74   128/70   Pulse: 84 83  81   Resp: 17   18   Temp: 98.2 °F (36.8 °C)   97.5 °F (36.4 °C)   SpO2: 97%  96% 95%   Weight:       Height:         General: Well Developed, Well Nourished, No Acute Distress  HEENT: pupils equal and round, no abnormalities noted  Neck: supple, no JVD, no carotid bruits  Heart: IRR without murmurs or gallops  Lungs: Diminished  Abd: soft, nontender, nondistended, with good bowel sounds  Ext: warm, + edema, dressing lower extremities  Skin: warm and dry  Psychiatric: Normal mood and affect  Neurologic: Alert and oriented X 3    Cardiographics    Telemetry: AFIB  ECG: atrial fibrillation  Echocardiogram: 5/3/2021  LEFT VENTRICLE: Size was normal. Systolic function was at the lower limits of normal. Ejection fraction was estimated in the range of 50 % to 55 %. There were no regional wall motion abnormalities. Septal flattening consistent with RV pressure overload. Wall thickness was normal. The study was not technically sufficient to allow evaluation of LV diastolic function.     RIGHT VENTRICLE: The ventricle was dilated. Systolic function was normal. Estimated peak pressure was in the range of 90-95 mmHg.     LEFT ATRIUM: Size was normal.     RIGHT ATRIUM: The atrium was mildly dilated.     SYSTEMIC VEINS: IVC: The inferior vena cava was dilated. The respirophasic change in diameter was less than 50%.     AORTIC VALVE: The valve was trileaflet. Leaflets exhibited mildly increased thickness. There was no evidence for stenosis. There was mild regurgitation. Pressure half time 861 msec.     MITRAL VALVE: Valve structure was normal. There was no evidence for stenosis. There was mild regurgitation.     TRICUSPID VALVE: The valve structure was normal. There was no evidence for stenosis. There was moderate to severe regurgitation.     PULMONIC VALVE: The valve structure was normal. There was no evidence for stenosis. There was mild regurgitation.     PERICARDIUM: There was no pericardial effusion.     AORTA: The root exhibited normal size.     Labs:   Recent Results (from the past 24 hour(s))   TROPONIN-HIGH SENSITIVITY    Collection Time: 05/03/21  1:52 PM   Result Value Ref Range    Troponin-High Sensitivity 29.3 (H) 0 - 14 pg/mL   LACTIC ACID    Collection Time: 05/03/21  1:52 PM   Result Value Ref Range    Lactic acid 1.1 0.4 - 2.0 MMOL/L   CULTURE, BLOOD    Collection Time: 05/03/21  1:52 PM    Specimen: Blood   Result Value Ref Range    Special Requests: RIGHT  Antecubital        Culture result: NO GROWTH AFTER 17 HOURS     GLUCOSE, POC    Collection Time: 05/03/21  4:23 PM   Result Value Ref Range    Glucose (POC) 69 65 - 100 mg/dL    Performed by [facility]    GLUCOSE, POC    Collection Time: 05/03/21  4:42 PM   Result Value Ref Range    Glucose (POC) 56 (L) 65 - 100 mg/dL    Performed by [facility]    GLUCOSE, POC    Collection Time: 05/03/21  5:01 PM   Result Value Ref Range    Glucose (POC) 81 65 - 100 mg/dL    Performed by Bibi    TROPONIN-HIGH SENSITIVITY    Collection Time: 05/03/21  7:27 PM   Result Value Ref Range    Troponin-High Sensitivity 28.7 (H) 0 - 14 pg/mL   GLUCOSE, POC    Collection Time: 05/03/21  8:50 PM   Result Value Ref Range    Glucose (POC) 122 (H) 65 - 100 mg/dL    Performed by Balbir    CBC WITH AUTOMATED DIFF    Collection Time: 05/04/21  6:37 AM   Result Value Ref Range    WBC 6.5 4.3 - 11.1 K/uL    RBC 4.30 4.23 - 5.6 M/uL    HGB 14.0 13.6 - 17.2 g/dL    HCT 42.0 41.1 - 50.3 %    MCV 97.7 79.6 - 97.8 FL    MCH 32.6 26.1 - 32.9 PG    MCHC 33.3 31.4 - 35.0 g/dL    RDW 17.1 (H) 11.9 - 14.6 %    PLATELET 051 (L) 178 - 450 K/uL    MPV 12.4 (H) 9.4 - 12.3 FL    ABSOLUTE NRBC 0.00 0.0 - 0.2 K/uL    DF AUTOMATED      NEUTROPHILS 71 43 - 78 %    LYMPHOCYTES 14 13 - 44 %    MONOCYTES 9 4.0 - 12.0 %    EOSINOPHILS 5 0.5 - 7.8 %    BASOPHILS 1 0.0 - 2.0 %    IMMATURE GRANULOCYTES 0 0.0 - 5.0 %    ABS. NEUTROPHILS 4.6 1.7 - 8.2 K/UL    ABS. LYMPHOCYTES 0.9 0.5 - 4.6 K/UL    ABS. MONOCYTES 0.6 0.1 - 1.3 K/UL    ABS. EOSINOPHILS 0.3 0.0 - 0.8 K/UL    ABS. BASOPHILS 0.1 0.0 - 0.2 K/UL    ABS. IMM. GRANS. 0.0 0.0 - 0.5 K/UL   GLUCOSE, POC    Collection Time: 05/04/21  7:28 AM   Result Value Ref Range    Glucose (POC) 86 65 - 100 mg/dL    Performed by [facility]    GLUCOSE, POC    Collection Time: 05/04/21 11:09 AM   Result Value Ref Range    Glucose (POC) 100 65 - 100 mg/dL    Performed by [facility]      Pt has been seen and examined by Dr. Omar Robison. He agrees with the following assessment and plan. Assessment/Plan:        Acute Respiratory failure with hypoxia (HCC) (5/3/2021)  - on 4L NC  - per primary       Diastolic heart failure   - only received 3 doses of IV Lasix  - net negative 5 L  - Strict I&O's  - daily weights       Pulmonary HTN  - on Revatio at home  - monitor       Hallucinations (5/3/2021)  - per primary       Pneumonia (5/3/2021)  - on doxycycline       Acute metabolic encephalopathy (9/1/3730)  - CT head showed no acute intracranial abnormality   - MRI brain- pending   - per primary       Atrial fibrillation/flutter   - appears persistent  - rate controlled, on metoprolol  - on Eliquis       HTN  - stable   - on BB, ACE-I      HLD  - on statin       DM  - SSI   - per primary       Thank you for requesting cardiac evaluation and allowing us to participate in the care of this patient. We will continue to follow along with you.       Orestes Stinson PA-C  Supervising Physician:  Erin Do     I have personally seen and examined patient and agree with above assessment. I agree and confirm with findings with additional details/exceptions as listed below:    No prior history of coronary disease. History of preserved EF and moderately elevated RVSP per prior echocardiogram from 8/21/2019 [around 45 to 50 mmHg and similar to echocardiogram from 2016]. Other history of permanent atrial fibrillation currently maintained on Eliquis. Appears previously was on amiodarone which was discontinued per outpatient records for issues with bradycardia. Last seen in the outpatient setting 9/2017 and has not followed up since. Difficult historian and history obtained from acquaintance who is helped him with his health care since last cardiology evaluation. Other history of hypertension, hyperlipidemia, diabetes mellitus, COPD. Appears patient presented to the ED with EMS due to increased confusion/hallucinations; chest x-ray with noted small left pleural effusion. Appears patient received some IV Lasix in the ER and subsequently on the floor and ~5L net negative charted. Currently on 3 L nasal cannula. Is not on home oxygen. Patient unable to give a history. Echo from 5/3/2021 with preserved EF but noted RVSP severely elevated at 90-95mmHg; septal flattening noted consistent with pressure/volume overload. Cardiology consulted for possible heart failure    Exam-irregularly irregular; variable S1 with holosystolic murmur at sternal border; lungs with minimal bibasilar rales; decreased at left base. Extremities-wrapped but appears trace to 1+ edema. Impression-exam currently not grossly volume overloaded but appears good diuretic response on presentation. Chest x-ray reviewed with left-sided small pleural effusion and likely contributed by severely elevated RVSP. Noted progression since echocardiogram from 8/2019 and possibly contributed by hypoxia.   Consideration for assessing for PE with noted change and RVSP/presenting hypoxia but on anticoagulation at this time and will not . Will need evaluation for pulmonary hypertension which can be done as an outpatient. Appears on Revatio as an outpatient but no work-up noted. Avoid restarting at this time until etiology of pulmonary hypertension ascertained. Cautious with aggressive diuresis in the setting of severely elevated RVSP. Adequate rate control at this time. Further recommendations pending clinical course.     Brooklyn Nunn MD  5/4/2021

## 2021-05-04 NOTE — PROGRESS NOTES
CHF teaching /introduction held until Cardiology sees.     Cardiac diet/ FR  Palliaitve Care: ACP on file

## 2021-05-04 NOTE — INTERDISCIPLINARY ROUNDS
Interdisciplinary Rounds completed with Nursing, Case Management, Physician and PT/OT present. Plan of care reviewed and updated.     Consults include Cardiology    Expected discharge date: TBD     Location:REHAB PT/OT/PPD ordered

## 2021-05-04 NOTE — PROGRESS NOTES
ACUTE PHYSICAL THERAPY GOALS:  (Developed with and agreed upon by patient and/or caregiver.)  (1.) Sallie Pugh will move from supine to sit and sit to supine , scoot up and down and roll side to side with STAND BY ASSIST within 7 treatment day(s). (2.) Sallie Pugh will transfer from bed to chair and chair to bed with STAND BY ASSIST using the least restrictive device within 7 treatment day(s). (3.) Sallie Pugh will ambulate with CONTACT GUARD ASSIST for 300 feet with the least restrictive device within 7 treatment day(s). (4.) Sallie Pugh will perform standing static and dynamic balance activities x 25 minutes with CONTACT GUARD ASSIST to improve safety within 7 treatment day(s). (5.) Sallie Pugh will perform therapeutic exercises x 15 min for HEP with INDEPENDENCE to improve strength, endurance, and functional mobility within 7 treatment day(s).      PHYSICAL THERAPY ASSESSMENT: Initial Assessment PT Treatment Day # 1    Sallie Pugh is a 79 y.o. male   PRIMARY DIAGNOSIS: <principal problem not specified>  Acute metabolic encephalopathy [Y53.96]  Respiratory failure with hypoxia (HCC) [J96.91]  CHF (congestive heart failure) ([facility] [address] [I50.9]  Pneumonia [J18.9]  Hallucinations [R44.3]     Reason for Referral:   ICD-10: Treatment Diagnosis: Generalized Muscle Weakness (M62.81)  Other lack of cordination (R27.8)  Difficulty in walking, Not elsewhere classified (R26.2)  Other abnormalities of gait and mobility (R26.89)  Repeated Falls (R29.6)  History of falling (Z91.81)  Unspecified Lack of Coordination (R27.9)  INPATIENT: Payor: [employer] / Plan: [facility] [employer] / Product Type: Managed Care Medicare /     ASSESSMENT:     REHAB RECOMMENDATIONS:   Recommendation to date pending progress:  Setting:   Short-term Rehab  Equipment:    To Be Determined     PRIOR LEVEL OF FUNCTION:  (Prior to Hospitalization) INITIAL/CURRENT LEVEL OF FUNCTION:  (Most Recently Demonstrated)   Bed Mobility:   Modified Independent  Sit to Stand:   Modified Independent  Transfers:   Modified Independent  Gait/Mobility:   Modified Independent Bed Mobility:   Minimal Assistance  Sit to Stand:   Minimal Assistance  Transfers:   Minimal Assistance  Gait/Mobility:   Minimal Assistance     ASSESSMENT:  Mr. Caitlyn Mccauley is an 79year old M who presents to hospital with acute metabolic encephalopathy, CHF. This date pt performs mobility including bed mobility, sitting balance activities, sit <> stand transfers, and steps to chair/ambulation in room with Min A and RW. Cues for sequencing and safety. Pt presents as functioning below his baseline, with deficits in mobility including transfers, gait, balance, and activity tolerance. Pt will benefit from skilled therapy services to address stated deficits to promote return to highest level of function, independence, and safety. Will continue to follow. SUBJECTIVE:   Mr. Caitlyn Mccauley states, \"I like to be called Idalia Cardoza. \"    SOCIAL HISTORY/LIVING ENVIRONMENT: Lives alone, his cousin checks in on him and assists as needed. Has cane and RW for mobility.   Home Environment: Apartment  # Steps to Enter: 0  One/Two Story Residence: One story  Living Alone: Yes  Support Systems: Family member(s)  OBJECTIVE:     PAIN: VITAL SIGNS: LINES/DRAINS:   Pre Treatment: Pain Screen  Pain Scale 1: Numeric (0 - 10)  Pain Intensity 1: 0  Post Treatment: 0/10 Vital Signs  O2 Device: Nasal cannula  O2 Flow Rate (L/min): 3 l/min Purewick  O2 Device: Nasal cannula     GROSS EVALUATION:  BLE Within Functional Limits Abnormal/ Functional Abnormal/ Non-Functional (see comments) Not Tested Comments:   AROM [x] [] [] []    PROM [x] [] [] []    Strength [] [x] [] []  generally decreased strength BLE   Balance [] [x] [] []  unsteadiness on feet   Posture [] [x] [] []  forward head, rounded shoulders   Sensation [] [x] [] []  wounds BLE; wrapped Coordination [x] [] [] []    Tone [] [] [] [x]    Edema [] [] [] [x]    Activity Tolerance [] [x] [] []     [] [] [] []      COGNITION/  PERCEPTION: Intact Impaired   (see comments) Comments:   Orientation [] [x]    Vision [x] []    Hearing [x] []    Command Following [x] []    Safety Awareness [] [x]     [] []      MOBILITY: I Mod I S SBA CGA Min Mod Max Total  NT x2 Comments:   Bed Mobility    Rolling [] [] [] [] [] [x] [] [] [] [] []    Supine to Sit [] [] [] [] [] [x] [] [] [] [] []    Scooting [] [] [] [] [] [x] [] [] [] [] []    Sit to Supine [] [] [] [] [] [x] [] [] [] [] []    Transfers    Sit to Stand [] [] [] [] [] [x] [] [] [] [] []    Bed to Chair [] [] [] [] [] [x] [] [] [] [] []    Stand to Sit [] [] [] [] [] [x] [] [] [] [] []    I=Independent, Mod I=Modified Independent, S=Supervision, SBA=Standby Assistance, CGA=Contact Guard Assistance,   Min=Minimal Assistance, Mod=Moderate Assistance, Max=Maximal Assistance, Total=Total Assistance, NT=Not Tested  GAIT: I Mod I S SBA CGA Min Mod Max Total  NT x2 Comments:   Level of Assistance [] [] [] [] [] [x] [] [] [] [] []    Distance 8 ft    DME Rolling Walker and Gait Belt    Gait Quality Short, shuffled, unsteady steps    Weightbearing Status N/A     I=Independent, Mod I=Modified Independent, S=Supervision, SBA=Standby Assistance, CGA=Contact Guard Assistance,   Min=Minimal Assistance, Mod=Moderate Assistance, Max=Maximal Assistance, Total=Total Assistance, NT=Not Tested    [address] [facility] [address] [id number] AM-PAC 6 Clicks   Basic Mobility Inpatient Short Form       How much difficulty does the patient currently have. .. Unable A Lot A Little None   1. Turning over in bed (including adjusting bedclothes, sheets and blankets)? [] 1   [] 2   [x] 3   [] 4   2. Sitting down on and standing up from a chair with arms ( e.g., wheelchair, bedside commode, etc.)   [] 1   [] 2   [x] 3   [] 4   3. Moving from lying on back to sitting on the side of the bed?    [] 1   [] 2 [x] 3   [] 4   How much help from another person does the patient currently need. .. Total A Lot A Little None   4. Moving to and from a bed to a chair (including a wheelchair)? [] 1   [] 2   [x] 3   [] 4   5. Need to walk in hospital room? [] 1   [] 2   [x] 3   [] 4   6. Climbing 3-5 steps with a railing? [] 1   [x] 2   [] 3   [] 4   © 2007, Trustees of [facility] [employer], under license to [employer]. All rights reserved     Score:  Initial: 17 Most Recent: X (Date: -- )    Interpretation of Tool:  Represents activities that are increasingly more difficult (i.e. Bed mobility, Transfers, Gait). PLAN:   FREQUENCY/DURATION: PT Plan of Care: 3 times/week for duration of hospital stay or until stated goals are met, whichever comes first.    PROBLEM LIST:   (Skilled intervention is medically necessary to address:)  1. Decreased Activity Tolerance  2. Decreased Balance  3. Decreased Cognition  4. Decreased Coordination  5. Decreased Gait Ability  6. Decreased Strength  7. Decreased Transfer Abilities   INTERVENTIONS PLANNED:   (Benefits and precautions of physical therapy have been discussed with the patient.)  1. Therapeutic Activity  2. Therapeutic Exercise/HEP  3. Neuromuscular Re-education  4. Gait Training  5. Education     TREATMENT:     EVALUATION: Moderate Complexity : (Untimed Charge)    TREATMENT:   ($$ Therapeutic Activity: 8-22 mins    )  Therapeutic Activity (10 Minutes): Therapeutic activity included Rolling, Supine to Sit, Scooting, Transfer Training, Ambulation on level ground, Sitting balance  and Standing balance to improve functional Mobility, Strength and Activity tolerance.     TREATMENT GRID:  N/A    AFTER TREATMENT POSITION/PRECAUTIONS:  Alarm Activated, Chair, Needs within reach and RN notified    INTERDISCIPLINARY COLLABORATION:  RN/PCT, PT/PTA and OT/MILIAN    TOTAL TREATMENT DURATION:  PT Patient Time In/Time Out  Time In: 1352  Time Out: Jorge 26, PT

## 2021-05-04 NOTE — PROGRESS NOTES
[facility] Hospitalist Progress Note    Patient: Sallie Pugh MRN: [id number]  SSN: [id number]    YOB: 1940  Age: 79 y.o. Sex: male      Admit Date: 5/3/2021    LOS: 1 day     Subjective:     Chief Complaint: Confusion and SOB  Reason for Admission: Acute respiratory failure with hypoxia ([facility] [address]    79 y.o. male with medical h/o chronic dCHF, COPD and diabetes who presented to [facility] ED with a complaint of abnormal behaviors, SOB and a general decline over the last several weeks. He was found to have acute respiratory failure due to acute dCHF and pneumonia. He was started on Lasix and Doxycyline. 5/4 - He feels better today. Still mildly confused, but improved. Denies CP/SOB. Denies F/C. Denies N/V/D. Review of systems negative except stated above. Assessment/Plan (MDM):      Active Medical Complaints and Diagnoses:    Principal Problem:    Acute respiratory failure with hypoxia ([facility] [address] (5/3/2021)  - Satting 85% on room air in ER  - Now on 4LNC satting 94%  - Due to acute dCHF + pneumonia  - Continue Doxycycline  - Continue Lasix - per Cardiology  - Wean oxygen as appropriate    Active Problems:    Acute on chronic diastolic congestive heart failure ([facility] [address] (10/27/2016)  - BNP 2,274 + CXR evidence  - 5L UOP since admit  - Continue Lasix - per Cardiology  - Continue Lopressor  - Continue Lisinopril      Pneumonia due to Escherichia coli ([facility] [address] (5/3/2021)  - Seen on CXR  - Continue Doxycycline  - No sputum production currently      Acute metabolic encephalopathy (4/7/7246)  - Slowly improving  - Due to acute illness  - Continue current treatment      Diabetes mellitus type II, non insulin dependent ([address] (7/23/2014)  - Stable  - Continue Humalog SSI      Paroxysmal atrial fibrillation ([address] (6/12/2019)  - Stable  - Continue Lopressor  - Continue Eliquis      Stage 3 chronic kidney disease ([facility] [address] (6/18/2020)  - Stable      PVD (peripheral vascular disease) ([facility] [address] (10/27/2016)      Hyperlipidemia (7/23/2014)      Class 1 obesity due to excess calories with serious comorbidity and body mass index (BMI) of 32.0 to 32.9 in adult (8/12/2019)      Otherwise all chronic medical issues appear stable with no changes. See assessment at bottom of progress note for complete acute, chronic, and resolved hospital medical issues. Diet: DIET CARDIAC Regular; Consistent Carb 1800kcal  VTE ppx: Eliquis    Objective:     Visit Vitals  /70 (BP 1 Location: Right upper arm, BP Patient Position: At rest)   Pulse 81   Temp 97.5 °F (36.4 °C)   Resp 18   Ht 6' (1.829 m)   Wt 104.3 kg (230 lb)   SpO2 95%   BMI 31.19 kg/m²      Oxygen Therapy  O2 Sat (%): 95 % (05/04/21 1107)  Pulse via Oximetry: 78 beats per minute (05/04/21 1048)  O2 Device: Nasal cannula (05/04/21 1352)  Skin Assessment: Clean, dry, & intact (05/04/21 1048)  Skin Protection for O2 Device: No (05/04/21 1048)  O2 Flow Rate (L/min): 3 l/min (05/04/21 1352)  FIO2 (%): 32 % (05/04/21 1048)      Intake and Output:     Intake/Output Summary (Last 24 hours) at 5/4/2021 1550  Last data filed at 5/4/2021 0948  Gross per 24 hour   Intake    Output 5175 ml   Net -5175 ml         Physical Exam:   GENERAL: alert, cooperative, no distress, appears stated age  EYE: conjunctivae/corneas clear. PERRL. THROAT & NECK: normal and no erythema or exudates noted. LUNG: clear to auscultation bilaterally  HEART: irregular rate and rhythm, S1S2, no murmur, no JVD  ABDOMEN: soft, non-tender, non-distended. Bowel sounds normal.   EXTREMITIES:  Trace edema, Legs wrapped in ACE bandage  SKIN: no rash or abnormalities  NEUROLOGIC: A&Ox2-3. Cranial nerves 2-12 grossly intact.     Lab/Data Review:  Recent Results (from the past 24 hour(s))   GLUCOSE, POC    Collection Time: 05/03/21  4:23 PM   Result Value Ref Range    Glucose (POC) 69 65 - 100 mg/dL    Performed by FarthingChasytiePCT    GLUCOSE, POC    Collection Time: 05/03/21  4:42 PM   Result Value Ref Range    Glucose (POC) 56 (L) 65 - 100 mg/dL    Performed by Eugenio    GLUCOSE, POC    Collection Time: 05/03/21  5:01 PM   Result Value Ref Range    Glucose (POC) 81 65 - 100 mg/dL    Performed by Bibi    TROPONIN-HIGH SENSITIVITY    Collection Time: 05/03/21  7:27 PM   Result Value Ref Range    Troponin-High Sensitivity 28.7 (H) 0 - 14 pg/mL   GLUCOSE, POC    Collection Time: 05/03/21  8:50 PM   Result Value Ref Range    Glucose (POC) 122 (H) 65 - 100 mg/dL    Performed by Balbir    CBC WITH AUTOMATED DIFF    Collection Time: 05/04/21  6:37 AM   Result Value Ref Range    WBC 6.5 4.3 - 11.1 K/uL    RBC 4.30 4.23 - 5.6 M/uL    HGB 14.0 13.6 - 17.2 g/dL    HCT 42.0 41.1 - 50.3 %    MCV 97.7 79.6 - 97.8 FL    MCH 32.6 26.1 - 32.9 PG    MCHC 33.3 31.4 - 35.0 g/dL    RDW 17.1 (H) 11.9 - 14.6 %    PLATELET 377 (L) 482 - 450 K/uL    MPV 12.4 (H) 9.4 - 12.3 FL    ABSOLUTE NRBC 0.00 0.0 - 0.2 K/uL    DF AUTOMATED      NEUTROPHILS 71 43 - 78 %    LYMPHOCYTES 14 13 - 44 %    MONOCYTES 9 4.0 - 12.0 %    EOSINOPHILS 5 0.5 - 7.8 %    BASOPHILS 1 0.0 - 2.0 %    IMMATURE GRANULOCYTES 0 0.0 - 5.0 %    ABS. NEUTROPHILS 4.6 1.7 - 8.2 K/UL    ABS. LYMPHOCYTES 0.9 0.5 - 4.6 K/UL    ABS. MONOCYTES 0.6 0.1 - 1.3 K/UL    ABS. EOSINOPHILS 0.3 0.0 - 0.8 K/UL    ABS. BASOPHILS 0.1 0.0 - 0.2 K/UL    ABS. IMM.  GRANS. 0.0 0.0 - 0.5 K/UL   GLUCOSE, POC    Collection Time: 05/04/21  7:28 AM   Result Value Ref Range    Glucose (POC) 86 65 - 100 mg/dL    Performed by JajaT    GLUCOSE, POC    Collection Time: 05/04/21 11:09 AM   Result Value Ref Range    Glucose (POC) 100 65 - 100 mg/dL    Performed by Eugenio    PLEASE READ & DOCUMENT PPD TEST IN 24 HRS    Collection Time: 05/04/21  2:53 PM   Result Value Ref Range    PPD Negative Negative    mm Induration 0 0 - 5 mm       SARS-CoV-2 Lab Results  \"Novel Coronavirus\" Test: No results found for: COV2NT   \"Emergent Disease\" Test: No results found for: EDPR  \"SARS-COV-2\" Test: No results found for: XGCOVT  \"[facility]\" Test: No results found for: RSLT  Rapid Test:   Lab Results   Component Value Date/Time    COVR Not detected 04/28/2021 05:21 PM           Imaging:  Xr Chest Pa Lat    Result Date: 4/28/2021  Chest 2 view CLINICAL INDICATION: Acute severe Weakness, difficulty walking, fell yesterday, altered mental status COMPARISON: 6/11/2020, 8/12/2019 TECHNIQUE: Sitting upright frontal and lateral views of the chest FINDINGS: Lung volumes are well inflated. Cardiomediastinal silhouette and hilar contours are stable. Minimal linear atelectasis or scarring is scattered in both bases. The lungs demonstrating no consolidation, pneumothorax, pleural effusion or CHF. No acute osseous abnormalities are seen. No acute airspace disease. Ct Head Wo Cont    Result Date: 5/3/2021  EXAMINATION: HEAD CT WITHOUT CONTRAST 5/3/2021 9:34 AM ACCESSION NUMBER: [id number] INDICATION: ALTERED mental status COMPARISON: Brain MRI 12/5/2016, head CT 12/5/2016 TECHNIQUE: Multiple-row detector helical CT examination of the head without intravenous contrast. Radiation dose reduction techniques were used for this study:  Our CT scanners use one or all of the following: Automated exposure control, adjustment of the mA and/or kVp according to patient's size, iterative reconstruction. FINDINGS: The ventricles are stable in caliber in comparison to the prior exam and are appropriate for the mild degree of symmetric global brain parenchymal volume loss. There is no midline shift. The basilar cisterns are patent. There is no cerebellar tonsillar ectopia or herniation. Hypodensities in the periventricular and subcortical white matter are nonspecific, but they are most likely to represent chronic microangiopathy. Prior bilateral lens replacement.  Subtle left precentral gyrus encephalomalacia at the site of the infarction demonstrated on 12/5/2016 exam.  There is no evidence of acute intracranial hemorrhage or extra-axial collections. There is no CT evidence of acute infarction. The paranasal sinuses and mastoid air cells are well aerated and clear. 1. No acute intracranial abnormality. 2. Chronic findings as above. VOICE DICTATED BY: Dr. Michelle Ackerman     Xr Chest Port    Result Date: 5/3/2021  CHEST X-RAY, single portable view  5/3/2021 History: Altered mental status/confusion. Technique: Single frontal view of the chest. Comparison: Chest x-ray 4/28/2020 Findings: The cardiac silhouette is moderately enlarged although stable. The lungs are expanded without evidence for pneumothorax. New mild left basilar airspace changes and small left basilar effusion are seen. 1.  Stable cardiomegaly with new mild left basilar airspace changes and small left basilar effusion. Given the patient's acute symptoms, this is felt to most likely to represent pneumonia versus an atypical presentation of heart failure. Recommend clinical correlation. This report was made using voice transcription. Despite my best efforts to avoid any, transcription errors may persist. If there is any question about the accuracy of the report or need for clarification, then please call [phone], or text me through "[employer]v for clarification or correction. Xr Knee Lt 3 V    Result Date: 4/28/2021  EXAM:  XR KNEE LT 3 V INDICATION:   Left knee pain after fall COMPARISON: None. FINDINGS: Three views of the left knee demonstrate end-stage tricompartmental secondary osteoarthritis. Small joint effusion. No evidence of fractures or dislocations. .      End-stage tricompartmental secondary osteoarthritis with small joint effusion.      Results for orders placed or performed during the hospital encounter of 05/03/21   2D ECHO COMPLETE ADULT (TTE) W OR WO CONTR    Narrative    Marina  [address] [facility], [address] [facility]  [phone]    Transthoracic Echocardiogram  2D, M-mode, Doppler, and Color Doppler    Patient: Zechariah Bateman  MR #: [id number]  : 1940  Age: 79 years  Gender: Male  Study date: 03-May-2021  Account #: [de-identified]  Height: 72 in  Weight: 229.5 lb  BSA: 2.26 mï¾²  Status:Routine  Location: [facility]  BP: 138/ 70    Allergies: NO KNOWN ALLERGENS    Sonographer:  Osman Verdin  Group:  [facility]  Referring Physician:  Sherry Ortiz MD  Reading Physician:  DR. NAE PIMENTEL DO    INDICATIONS: Heart failure. Systolic, Chronic. PROCEDURE: This was a routine study. A transthoracic echocardiogram was  performed. The study included complete 2D imaging, M-mode, complete spectral  Doppler, and color Doppler. Intravenous contrast (Definity) was administered. This was a technically difficult study. LEFT VENTRICLE: Size was normal. Systolic function was at the lower limits of  normal. Ejection fraction was estimated in the range of 50 % to 55 %. There  were no regional wall motion abnormalities. Septal flattening consistent with  RV pressure overload. Wall thickness was normal. The study was not   technically  sufficient to allow evaluation of LV diastolic function. RIGHT VENTRICLE: The ventricle was dilated. Systolic function was normal.  Estimated peak pressure was in the range of 90-95 mmHg. LEFT ATRIUM: Size was normal.    RIGHT ATRIUM: The atrium was mildly dilated. SYSTEMIC VEINS: IVC: The inferior vena cava was dilated. The respirophasic  change in diameter was less than 50%. AORTIC VALVE: The valve was trileaflet. Leaflets exhibited mildly increased  thickness. There was no evidence for stenosis. There was mild regurgitation. Pressure half time 861 msec. MITRAL VALVE: Valve structure was normal. There was no evidence for stenosis. There was mild regurgitation. TRICUSPID VALVE: The valve structure was normal. There was no evidence for  stenosis. There was moderate to severe regurgitation.     PULMONIC VALVE: The valve structure was normal. There was no evidence for  stenosis. There was mild regurgitation. PERICARDIUM: There was no pericardial effusion. AORTA: The root exhibited normal size. SUMMARY:    -  Left ventricle: Systolic function was at the lower limits of normal.  Ejection fraction was estimated in the range of 50 % to 55 %. There were no  regional wall motion abnormalities. Septal flattening consistent with RV  pressure overload. -  Right ventricle: The ventricle was dilated. Estimated peak pressure was in  the range of 90-95 mmHg. -  Right atrium: The atrium was mildly dilated. -  Inferior vena cava, hepatic veins: The inferior vena cava was dilated. The  respirophasic change in diameter was less than 50%. -  Mitral valve: There was mild regurgitation.    -  Tricuspid valve: There was moderate to severe regurgitation. SYSTEM MEASUREMENT TABLES    2D mode  AoR Diam (2D): 2.7 cm  LA Dimension (2D): 3.7 cm  Left Atrium Systolic Volume Index; Method of Disks, Biplane; 2D mode;: 21.6  ml/m2  IVS/LVPW (2D): 1  IVSd (2D): 0.9 cm  LVIDd (2D): 4.5 cm  LVIDs (2D): 2.3 cm  LVPWd (2D): 0.9 cm  RVIDd (2D): 4.1 cm    Unspecified Scan Mode  Peak Grad; Mean; Antegrade Flow: 11 mm[Hg]  Vmax; Antegrade Flow: 169 cm/s    Prepared and signed by     [address] [place] [address]   Signed 03-May-2021 16:15:09         Cultures: All Micro Results     Procedure Component Value Units Date/Time    CULTURE, URINE [[id number]] Collected: 05/03/21 0948    Order Status: Completed Specimen: Cath Urine Updated: 05/04/21 0554     Special Requests: NO SPECIAL REQUESTS        Culture result:       NO GROWTH AFTER SHORT PERIOD OF INCUBATION. FURTHER RESULTS TO FOLLOW AFTER OVERNIGHT INCUBATION.           CULTURE, BLOOD [[id number]] Collected: 05/03/21 1352    Order Status: Completed Specimen: Blood Updated: 05/04/21 0810     Special Requests: --        RIGHT  Antecubital       Culture result: NO GROWTH AFTER 17 HOURS             Assessment: Primary Diagnosis: Acute respiratory failure with hypoxia [facility])    Hospital Problems as of 5/4/2021 Date Reviewed: 12/21/2020          Codes Class Noted - Resolved POA    * (Principal) Acute respiratory failure with hypoxia ([facility] ICD-10-CM: J96.01  ICD-9-CM: 518.81  5/3/2021 - Present Yes        Pneumonia due to Escherichia coli [facility]) ICD-10-CM: J15.5  ICD-9-CM: 482.82  5/3/2021 - Present Yes        Acute on chronic diastolic congestive heart failure (HCC) ICD-10-CM: I50.33  ICD-9-CM: 428.33, 428.0  10/27/2016 - Present Yes        Acute metabolic encephalopathy HDR-59-WK: G93.41  ICD-9-CM: 348.31  5/3/2021 - Present Yes        Stage 3 chronic kidney disease ([facility] ICD-10-CM: N18.30  ICD-9-CM: 585.3  6/18/2020 - Present Yes        Paroxysmal atrial fibrillation ([facility] ICD-10-CM: I48.0  ICD-9-CM: 427.31  6/12/2019 - Present Yes        Diabetes mellitus type II, non insulin dependent (HCC) (Chronic) ICD-10-CM: E11.9  ICD-9-CM: 250.00  7/23/2014 - Present Yes        PVD (peripheral vascular disease) ([facility] ICD-10-CM: I73.9  ICD-9-CM: 443.9  10/27/2016 - Present Yes        Class 1 obesity due to excess calories with serious comorbidity and body mass index (BMI) of 32.0 to 32.9 in adult ICD-10-CM: E66.09, Z68.32  ICD-9-CM: 278.00, V85.32  8/12/2019 - Present Yes        Hyperlipidemia (Chronic) ICD-10-CM: E78.5  ICD-9-CM: 272.4  7/23/2014 - Present Yes        RESOLVED: Hallucinations ICD-10-CM: R44.3  ICD-9-CM: 780.1  5/3/2021 - 5/4/2021 Yes                Discharge Plan:     STR in 2-3 days    Signed By: Luis Eduardo Mc      May 4, 2021

## 2021-05-04 NOTE — H&P
[facility]  History and Physical    Patient ID:  Christine Jordan  male  1940  [id number]    Admission Date: 5/3/2021  Chief Complaint: Hallucinations and disorientation; dark urine, falls  Reason for Admission: Acute hypoxic respiratory failure    ASSESSMENT & PLAN:    Acute hypoxic respiratory failuresuspected to be secondary to CHF versus a pneumonia. We will continue him on supplemental oxygen. He will is on 2 L and still satting in the 70s to 80s in the ER. I Increased his oxygen to 5 L and now he is 94% or higher. Will wean oxygen as tolerated. Concern for CHF-patient has elevated BNP. With concern for a left-sided pleural effusion and is concerning for pneumonia versus atypical presentation of heart failure per radiology. We will treat him as per CHF protocols including daily weight, appropriate CHF medications. We will also get a 2D echo. Cardiology consult as indicated. Concern for pneumonia-based on the chest x-ray findings and hypoxia this is in the differential however his WBC count is normal.  We will cover him empirically with doxycycline. Acute metabolic encephalopathy-as indicated based on his hallucinations. Could be secondary to the hypoxia or multifactorial.  We will continue supportive care with O2 and treat underlying conditions and monitor for resolution. Concern for possible stroke-CT head was negative we will obtain MRI brain. With further work-up and management based on that. Concern for possible need for placement-given the situation at home and the patients presentation is concerning for need for placement. We will place a PPD and get therapy evaluations. Disposition: Unclear at this time likely to skilled nursing facility   Diet: Cardiac diabetic  VTE ppx: Eliquis  GI ppx: Pepcid  CODE STATUS: DNR  Surrogate decision-maker: Dk Salomon [phone]   Further work-up and management based on his clinical course.     HISTORY OF PRESENT ILLNESS:  Patient is a 79 y.o. male with medical h/o CHF, COPD and diabetes who presented to [facility] ED with a complaint of abnormal behaviors and a general decline over the last several weeks. The patient himself is unable to give a history secondary to his current medical condition and not being sure of exactly what happened. History is taken the kidneys causing chronic ER staff, ER physician and patient's chart. He has a cousin who reportedly watches out for him and checks on him frequently. She has noticed that he has not been cleaning himself up in the morning and has been calling her to tell her that people were in the house. When she went by to see the patient this morning he had barricaded himself up in the bathroom and there was no one else in the house. She is also noted that he has been leaning towards the left for several days and is fallen twice. EMS was called to bring the patient to the hospital.      When I saw the patient he was oriented to place but not time states the year is  and the month is February. He is not sure why he is in the hospital.  States that he has been feeling unwell. Endorses shortness of breath but states that it is chronic and has been present for many years. He is not sure as to the underlying cause likely is heart. He denies any fever chills chest pain burning or pain with micturition or diarrhea constipation. Denies any numbness or tingling. He states that he wants to be a DNR. No Known Allergies    Prior to Admission Medications   Prescriptions Last Dose Informant Patient Reported? Taking?   acetaminophen (TYLENOL) 500 mg tablet   Yes No   Sig: Take 500 mg by mouth every six (6) hours as needed for Pain. allopurinoL (ZYLOPRIM) 300 mg tablet   No No   Sig: TAKE 1 TABLET BY MOUTH DAILY   apixaban (ELIQUIS) 5 mg tablet   No No   Sig: Take 1 Tab by mouth two (2) times a day.    aspirin delayed-release 81 mg tablet   No No   Si daily   Patient taking differently: Take 162 mg by mouth daily. atorvastatin (LIPITOR) 80 mg tablet   No No   Sig: TAKE 1 TABLET BY MOUTH NIGHTLY   colchicine 0.6 mg tablet   No No   Si bid prn as directed   diclofenac (VOLTAREN) 1 % gel   No No   Si-4 g daily on affected areas as needed for pain cheaper over-the-counter or with good Rx if not covered by insurance,   finasteride (PROSCAR) 5 mg tablet   No No   Sig: TAKE 1 TABLET BY MOUTH DAILY   furosemide (LASIX) 20 mg tablet   No No   Sig: Take 1 Tab by mouth daily. losartan-hydroCHLOROthiazide (HYZAAR) 50-12.5 mg per tablet   No No   Sig: Take 1 Tab by mouth daily. metFORMIN (GLUCOPHAGE) 500 mg tablet   No No   Sig: TAKE ONE TWICE A DAY   Patient taking differently: Take 500 mg by mouth. TAKE ONE TWICE A DAY   metoprolol tartrate (LOPRESSOR) 50 mg tablet   No No   Sig: TAKE 1 TABLET BY MOUTH TWICE DAILY   multivitamin (ONE A DAY) tablet   No No   Sig: Take 1 Tab by mouth daily.    selenium sulfide 2.25 % sham   No No   Sig: Apply to scalp twice a week and wash after 10 minutes   sildenafil, antihypertensive, (REVATIO) 20 mg tablet   No No   Si daily as needed for erectile dysfunction   spironolactone (ALDACTONE) 25 mg tablet   No No   Sig: TAKE 1 TABLET BY MOUTH DAILY   traMADol (ULTRAM) 50 mg tablet   No No   Si twice a day as needed with OTC acetaminophen 500 mg      Facility-Administered Medications: None         Past Medical History:   Diagnosis Date    Arrhythmia     Arthritis     Benign essential hypertension 10/27/2016    BPH (benign prostatic hypertrophy) 2014    BXO (balanitis xerotica obliterans) 10/27/2016    CAD (coronary artery disease)     [facility]estive [facility] failure) [facility] 10/27/2016    Chronic kidney disease     Chronic pain     [facility]     Edema 10/27/2016    HTN (hypertension) 2014    Hypercholesterolemia 2014    Hypertensive heart disease without HF (heart failure) 10/27/2016    Hypertrophy of prostate with urinary obstruction and other lower urinary tract symptoms (LUTS) 2014    Hypertrophy of prostate without urinary obstruction and other lower urinary tract symptoms (LUTS) 2014    Osteoarthrosis, unspecified site 10/27/2016    PVD (peripheral vascular disease) ([facility] 10/27/2016    Stroke ([facility]     Urinary frequency 2014       Past Surgical History:   Procedure Laterality Date    HX ORTHOPAEDIC      hip replacement    HX UROLOGICAL      cystoscopy         Social History     Tobacco Use    Smoking status: Former Smoker     Quit date: 2000     Years since quittin.7    Smokeless tobacco: Never Used    Tobacco comment: Continues Cessation   Substance Use Topics    Alcohol use: No       FAMILY HISTORY:    Family History   Problem Relation Age of Onset    Heart Disease Father        REVIEW OF SYSTEMS:  A 14 point review of systems was taken and pertinent positive as per HPI. PHYSICAL EXAM:    Visit Vitals  /71 (BP 1 Location: Right arm, BP Patient Position: At rest)   Pulse 87   Temp 97.7 °F (36.5 °C)   Resp 18   Ht 6' (1.829 m)   Wt 104.3 kg (230 lb)   SpO2 95%   BMI 31.19 kg/m²       General: Patient is in obvious cardiopulmonary distress satting 74 to 80% on 2 L of oxygen by nasal cannula, speaking in short sentences, some use of accessory muscles   HEENT: Pupils equal and reactive to light and accommodation, oropharynx is clear   Neck: Supple, no lymphadenopathy, no JVD   Lungs: Clear equal to auscultation bilaterally. diminished in the bases  Cardiovascular: Regular rate and rhythm with normal S1 and S2   Abdomen: Soft, nontender, nondistended, normoactive bowel sounds   Extremities: No cyanosis clubbing.   Bilateral lower extremity edema, feet are wrapped in compression bandages and stockings   Neuro: Nonfocal, A&O x1   Psych: Normal mood and affect     Intake/Output last 3 shifts:    Date 21 - 21 0659 21 - 21 0659   Shift 7829-7046 9253-0243 24 Hour Total 1517-3981 4982-2584 24 Hour Total   INTAKE   Shift Total(mL/kg)         OUTPUT   Urine(mL/kg/hr) 1600(1.3) 2475(2) 4075(1.6)        Urine Voided 1600  1600        Urine Occurrence(s) 4 x  4 x        Urine Output (mL) (External Urinary Catheter 05/03/21)  0738 0902      Shift Total(mL/kg) 1600(15.3) 8906(15.2) 4075(39.1)      NET -1600 -2475 -4075      Weight (kg) 104.3 104.3 104.3 104.3 104.3 104.3         Labs:  CMP:   Lab Results   Component Value Date/Time     05/03/2021 08:15 AM    K 4.4 05/03/2021 08:15 AM     05/03/2021 08:15 AM    CO2 26 05/03/2021 08:15 AM    AGAP 9 05/03/2021 08:15 AM    GLU 70 05/03/2021 08:15 AM    BUN 25 (H) 05/03/2021 08:15 AM    CREA 1.41 05/03/2021 08:15 AM    GFRAA >60 05/03/2021 08:15 AM    GFRNA 51 (L) 05/03/2021 08:15 AM    CA 10.8 (H) 05/03/2021 08:15 AM    ALB 3.6 05/03/2021 08:15 AM    TBILI 2.6 (H) 05/03/2021 08:15 AM    TP 7.9 05/03/2021 08:15 AM    GLOB 4.3 (H) 05/03/2021 08:15 AM    AGRAT 0.8 (L) 05/03/2021 08:15 AM    ALT 40 05/03/2021 08:15 AM         CBC:    Lab Results   Component Value Date/Time    WBC 5.6 05/03/2021 08:15 AM    HGB 13.6 05/03/2021 08:15 AM    HCT 41.5 05/03/2021 08:15 AM     (L) 05/03/2021 08:15 AM       Lab Results   Component Value Date/Time    INR 1.0 12/04/2016 12:37 AM    INR (POC) 1.0 12/04/2016 12:29 AM    Prothrombin time 10.8 12/04/2016 12:37 AM       ABG:    Lab Results   Component Value Date/Time    PHI 7.47 (H) 05/03/2021 09:15 AM    PCO2I 36.2 05/03/2021 09:15 AM    PO2I 57 (L) 05/03/2021 09:15 AM    HCO3I 26.0 05/03/2021 09:15 AM           Lab Results   Component Value Date/Time     07/22/2014 08:30 PM    Troponin-I, Qt. 0.02 01/28/2017 01:10 PM    BNP 31 07/31/2015 11:25 AM    B-type Natriuretic Peptide 301.4 (H) 08/12/2019 11:46 AM         Imaging & Other Studies:    XR Results (maximum last 3):   [facility]   XR CHEST PORT    Narrative CHEST X-RAY, single portable view  5/3/2021    History: Altered mental status/confusion. Technique: Single frontal view of the chest.    Comparison: Chest x-ray 4/28/2020    Findings: The cardiac silhouette is moderately enlarged although stable. The lungs are  expanded without evidence for pneumothorax. New mild left basilar airspace  changes and small left basilar effusion are seen. Impression 1. Stable cardiomegaly with new mild left basilar airspace changes and small  left basilar effusion. Given the patient's acute symptoms, this is felt to most  likely to represent pneumonia versus an atypical presentation of heart failure. Recommend clinical correlation. This report was made using voice transcription. Despite my best efforts to avoid  any, transcription errors may persist. If there is any question about the  accuracy of the report or need for clarification, then please call [phone], or text me through [employer] for clarification or correction. Results from Hospital Encounter encounter on 04/28/21   XR CHEST PA LAT    Narrative Chest 2 view    CLINICAL INDICATION: Acute severe Weakness, difficulty walking, fell yesterday,  altered mental status    COMPARISON: 6/11/2020, 8/12/2019    TECHNIQUE: Sitting upright frontal and lateral views of the chest     FINDINGS: Lung volumes are well inflated. Cardiomediastinal silhouette and  hilar contours are stable. Minimal linear atelectasis or scarring is scattered  in both bases. The lungs demonstrating no consolidation, pneumothorax, pleural  effusion or CHF. No acute osseous abnormalities are seen. Impression No acute airspace disease. XR KNEE LT 3 V    Narrative EXAM:  XR KNEE LT 3 V    INDICATION:   Left knee pain after fall    COMPARISON: None. FINDINGS: Three views of the left knee demonstrate end-stage tricompartmental  secondary osteoarthritis. Small joint effusion. No evidence of fractures or  dislocations. Leatha Mora Impression End-stage tricompartmental secondary osteoarthritis with small joint  effusion. CT Results (maximum last 3): Results from [place] encounter on 05/03/21   CT HEAD WO CONT    Narrative EXAMINATION: HEAD CT WITHOUT CONTRAST 5/3/2021 9:34 AM    ACCESSION NUMBER: [id number]    INDICATION: ALTERED mental status    COMPARISON: Brain MRI 12/5/2016, head CT 12/5/2016    TECHNIQUE: Multiple-row detector helical CT examination of the head without  intravenous contrast.     Radiation dose reduction techniques were used for this study:  Our CT scanners  use one or all of the following: Automated exposure control, adjustment of the  mA and/or kVp according to patient's size, iterative reconstruction. FINDINGS:    The ventricles are stable in caliber in comparison to the prior exam and are  appropriate for the mild degree of symmetric global brain parenchymal volume  loss. There is no midline shift. The basilar cisterns are patent. There is no  cerebellar tonsillar ectopia or herniation. Hypodensities in the periventricular and subcortical white matter are  nonspecific, but they are most likely to represent chronic microangiopathy. Prior bilateral lens replacement. Subtle left precentral gyrus encephalomalacia at the site of the infarction  demonstrated on 12/5/2016 exam.     There is no evidence of acute intracranial hemorrhage or extra-axial  collections. There is no CT evidence of acute infarction. The paranasal sinuses and mastoid air cells are well aerated and clear. Impression 1. No acute intracranial abnormality. 2. Chronic findings as above. VOICE DICTATED BY: Dr. Marybeth Cummins     Results from [facility] Encounter encounter on 12/04/16   CT HEAD WO CONT    Narrative Noncontrast CT of the brain. COMPARISON: December 4, 2016 at 0008 hours.     INDICATION: CVA, status post TPA    TECHNIQUE: Contiguous axial images were obtained from the skull base through the  vertex without IV contrast. Radiation dose reduction techniques were used for  this study:  Our CT scanners use one or all of the following: Automated exposure  control, adjustment of the mA and/or kVp according to patient's size, iterative  reconstruction. FINDINGS:    There is no acute intracranial hemorrhage or evidence for acute territorial  infarction. There is no mass effect, midline shift or hydrocephalus. There is no  extra-axial fluid collection. The cerebellum and brainstem are grossly  unremarkable. Periventricular diffuse hypodensities are nonspecific and likely secondary to  chronic small vessel disease. There is generalized cerebral volume loss,  age-related. Visualized orbits and the globes are intact. Visualized paranasal sinuses and  the mastoid air cells are aerated. Surrounding bones are intact. Impression IMPRESSION:    No acute intracranial hemorrhage or CT evidence of acute territorial infarction. No substantial change compared to December 4, 2016 CT. CT HEAD WO CONT    Narrative Noncontrast CT of the brain. COMPARISON: none    INDICATION: Right-sided facial droop and slurred speech beginning 2 hours ago. TECHNIQUE: Contiguous axial images were obtained from the skull base through the  vertex without IV contrast. Radiation dose reduction techniques were used for  this study:  Our CT scanners use one or all of the following: Automated exposure  control, adjustment of the mA and/or kVp according to patient's size, iterative  reconstruction. FINDINGS:    There is no acute intracranial hemorrhage or evidence for acute territorial  infarction. There is no mass effect, midline shift or hydrocephalus. There is no  extra-axial fluid collection. The cerebellum and brainstem are grossly  unremarkable. Periventricular diffuse hypodensities are nonspecific and likely secondary to  chronic small vessel disease.  There is generalized cerebral volume loss,  age-related. The visualized orbits and globes are intact. The visualized paranasal sinuses  and the mastoid air cells are aerated. Surrounding bones are intact. Impression IMPRESSION:    No acute intracranial hemorrhage or CT evidence of acute territorial infarction. MRI Results (maximum last 3): Results from [place] encounter on 12/04/16   MRI BRAIN WO CONT    Narrative History: Dysarthria with right arm weakness since yesterday. No surgery. No  trauma. EXAM: MRI brain without contrast    TECHNIQUE: Multiplanar multisequence imaging is performed. COMPARISON: CT head dated 12/5/2016    FINDINGS: There is acute to early subacute infarct involving the left parietal  white matter in the region of the posterior frontal cortex. There is restricted  diffusion, wedge-shaped, in this region. Gradient echo sequences demonstrate no  blooming artifact to suggest retained intracranial blood products. No  extra-axial fluid collection, mass, or mass effect. No midline shift. Normal  flow voids are present within the major intracranial vessels. The paranasal  sinuses and mastoid air cells are clear. Impression IMPRESSION: Acute-subacute infarct involving the left anterior parietal lobe in  the region of the posterior frontal cortex. DC4  The significant findings in this report have been communicated to the referring  provider or his/her designee as outlined in Section II.C.2.a.ii-iii of the ACR  Practice Guideline for Communication of Diagnostic Imaging Findings. Nuclear Medicine Results (maximum last 3): No results found for this or any previous visit. US Results (maximum last 3): Results from [place] encounter on 07/22/14   US ABD LTD    Narrative Right Upper Quadrant  Ultrasound    INDICATION:  Nausea    FINDINGS: There are no discrete lesions in the visualized portions of the liver  or pancreas. There is no bile duct dilatation.  The common bile duct measures 4  mm. The gallbladder is normal in size and appearance. No gallstones are seen. There is no wall thickening. The right kidney measures 9.7 cm in length. There is no hydronephrosis. There  is no evidence of a renal mass. There is no ascites. Impression IMPRESSION: Unremarkable right upper quadrant ultrasound           DEXA Results (maximum last 3): No results found for this or any previous visit. RED Results (maximum last 3): No results found for this or any previous visit. IR Results (maximum last 3): No results found for this or any previous visit. VAS/US Results (maximum last 3): Results from Office Visit encounter on 02/12/21   DUPLEX LOW EXT ARTERIES WITH GRACE    Narrative Final Vascular Lab ultrasound report scanned under the imaging tab. Click RESULTS link to view. Results from Hospital Encounter encounter on 12/04/16   DUPLEX CAROTID BILATERAL    Narrative HISTORY: Shortness of breath. CVA. [employer] Portable Bilateral carotid duplex sonography was performed. Right common and internal carotid artery peak systolic velocities were 86 and 87   cm/sec respectively with an internal to common carotid artery peak systolic  velocity ratio of 1.0 . There is intimal thickening  within the right carotid  system. The doppler arterial wave form tracings are normal.    The right vertebral artery demonstrates antegrade flow. The left common and internal carotid artery peak systolic velocities were 95   and 73 cm/sec respectively with an internal to common carotid artery peak  systolic velocity ratio of 0.8 . There is intimal thickening and mild  atherosclerotic plaque at carotid wall within the left carotid system. The  Doppler wave form tracings are normal.     Left vertebral artery demonstrates antegrade flow. Impression IMPRESSION: Mild bilateral atherosclerotic disease without any Doppler evidence  of significant stenosis. (Class I disease) .      Results from [facility] [place] encounter on 07/22/14   DUPLEX CAROTID BILATERAL    Narrative Carotid ultrasound, 7/23/2013. Indication: Dyslipidemia and syncope    Comparison: None. Findings: Evaluation was performed with Nascet criteria. Elise graph the right  common carotid artery systolic velocity is 74 cm/s, ICA 83 and ECA 77. The ICA  to CCA ratio is 1.1. The right vertebral artery is antegrade in flow. There is  mild plaque seen at the carotid bulb. The left common carotid artery systolic velocity is 69 cm/s, ICA 97 and ECA 76. The ICA to CCA ratio is 1.0. The left vertebral artery is antegrade in flow. There is mild plaque seen at the carotid bulb. Impression Impression: Less than 50% diameter stenosis of the cervical internal carotid  arteries bilaterally. PET Results (maximum last 3): No results found for this or any previous visit.        EKG Results     Procedure 720 Value Units Date/Time    EKG, 12 LEAD, INITIAL [[id number]] Collected: 05/03/21 0807    Order Status: Completed Updated: 05/03/21 1803     Ventricular Rate 83 BPM      Atrial Rate 312 BPM      QRS Duration 88 ms      Q-T Interval 394 ms      QTC Calculation (Bezet) 462 ms      Calculated R Axis -49 degrees      Calculated T Axis -52 degrees      Diagnosis --     Atrial fibrillation  Left anterior fascicular block  Poor R wave progression  ST & T wave abnormality, consider inferior ischemia or digitalis effect  Abnormal ECG  When compared with ECG of 03-MAY-2021 08:06,  QT has lengthened  Confirmed by Sindy Granados [id number]) on 5/3/2021 6:03:19 PM            Active Problems:    Patient Active Problem List    Diagnosis Date Noted    Hallucinations 05/03/2021    Pneumonia 05/03/2021    Respiratory failure with hypoxia ([address] 05/03/2021    Acute metabolic encephalopathy 21/55/1103    Longstanding persistent atrial fibrillation ([address] 06/18/2020    Diabetic nephropathy associated with type 2 diabetes mellitus ([address] 06/18/2020    Stage 3 chronic kidney disease ([facility] 06/18/2020    Class 1 obesity due to excess calories with serious comorbidity and body mass index (BMI) of 32.0 to 32.9 in adult 08/12/2019    Systemic hypertensive disorder complication 72/91/9661    Leg swelling 06/12/2019    [facility] 06/12/2019    Type 2 diabetes mellitus with nephropathy ([facility] 01/23/2018    CVA (cerebral vascular accident) ([facility] 12/04/2016    Edema 10/27/2016    PVD (peripheral vascular disease) ([facility] 10/27/2016    Osteoarthrosis, unspecified site 10/27/2016    CHF (congestive heart failure) ([facility] 10/27/2016    Diabetes mellitus type II, non insulin dependent ([facility] 07/23/2014    Hyperlipidemia 07/23/2014    ED (erectile dysfunction) 07/23/2014    Urinary frequency 01/07/2014    Benign prostatic hyperplasia with urinary hesitancy 01/07/2014         Jane Jason MD  [facility]  5/4/2021 11:16 AM

## 2021-05-04 NOTE — PROGRESS NOTES
Problem: Falls - Risk of  Goal: *Absence of Falls  Description: Document Jose C Shove Fall Risk and appropriate interventions in the flowsheet.   Outcome: Progressing Towards Goal  Note: Fall Risk Interventions:  Mobility Interventions: Bed/chair exit alarm, Communicate number of staff needed for ambulation/transfer, Patient to call before getting OOB         Medication Interventions: Bed/chair exit alarm, Patient to call before getting OOB, Teach patient to arise slowly    Elimination Interventions: Bed/chair exit alarm, Call light in reach    History of Falls Interventions: Bed/chair exit alarm

## 2021-05-04 NOTE — PROGRESS NOTES
ACUTE OT GOALS:  (Developed with and agreed upon by patient and/or caregiver.)  1. Patient will complete lower body bathing and dressing with minimal assistance and adaptive equipment as needed. 2. Patient will complete toileting and toileting transfers with CGA. 3. Patient will tolerate 30 minutes of OT treatment with 2-3 rest breaks to increase activity tolerance for ADLs. 4. Patient will complete functional transfers with supervision and adaptive equipment as needed. 5. Patient will complete functional mobility for household distances with modified independence and good safety awareness.    Timeframe: 7 visits       OCCUPATIONAL THERAPY ASSESSMENT: Initial Assessment and Daily Note OT Treatment Day # 1    Rory Ponce is a 79 y.o. male   PRIMARY DIAGNOSIS: Acute respiratory failure with hypoxia ([facility] [address]  Acute metabolic encephalopathy [M88.20]  Respiratory failure with hypoxia (HCC) [J96.91]  CHF (congestive heart failure) ([facility] [address] [I50.9]  Pneumonia [J18.9]  Hallucinations [R44.3]       Reason for Referral:    ICD-10: Treatment Diagnosis: Generalized Muscle Weakness (M62.81)  Other lack of cordination (R27.8)  History of falling (Z91.81)  INPATIENT: Payor: Ria [employer] / Plan: [place] [employer] / Product Type: [employer] /   ASSESSMENT:     REHAB RECOMMENDATIONS:   Recommendation to date pending progress:  Setting:   Short-term Rehab  Equipment:    To Be Determined     PRIOR LEVEL OF FUNCTION:  (Prior to Hospitalization)  INITIAL/CURRENT LEVEL OF FUNCTION:  (Based on today's evaluation)   Bathing:   Modified Independent  Dressing:   Minimal Assistance  Feeding/Grooming:   Modified Independent  Toileting:   Modified Independent  Functional Mobility:   Modified Independent Bathing:   Moderate Assistance  Dressing:   Moderate Assistance  Feeding/Grooming:   Minimal Assistance  Toileting:   Moderate Assistance  Functional Mobility:   Minimal Assistance ASSESSMENT:  Mr. Margaux Travis presents to the hospital with acute respiratory failure with hypoxia, CHF, pneumonia, and hallucinations. Pt lives alone at baseline and cousin that assists him as needed reports an overall decline over the past few weeks. Pt is extremely pleasant and oriented to person and place but needed cues for the month. Pt has wraps to BLEs due to edema/wounds. Pt required additional time to move but completed bed mobility with minimal assistance. Pt is currently wearing 3 L of O2 but does not wear O2 at baseline. Pt used rolling walker to complete functional mobility with minimal assistance to assist with balance. Pt assisted up to the chair with no major complaints. Pt's strength appears functional in B UE but pt does reports some numbness on his L side. Pt is currently functioning below his baseline and will benefit from OT services to address stated goals and plan of care. SUBJECTIVE:   Mr. Margaux Travis states, \"I have a hard time reaching to my feet. \"    SOCIAL HISTORY/LIVING ENVIRONMENT: Lives alone; pt has had 2 falls; uses a RW; cousin brings groceries and provides transportation  Home Environment: Apartment  # Steps to Enter: 0  One/Two Story Residence: One story  Living Alone: Yes  Support Systems: Family member(s)    OBJECTIVE:     PAIN: VITAL SIGNS: LINES/DRAINS:   Pre Treatment: Pain Screen  Pain Scale 1: Numeric (0 - 10)  Pain Intensity 1: 0  Post Treatment: 0   IV  O2 Device: Nasal cannula     GROSS EVALUATION:  B UE Within Functional Limits Abnormal/ Functional Abnormal/ Non-Functional (see comments) Not Tested Comments:   AROM [x] [] [] [] WFL   PROM [x] [] [] []    Strength [] [x] [] [] B UE   Balance [] [x] [] [] Fair to good sitting; fair standing    Posture [] [x] [] []    Sensation [] [x] [] [] Reports decrease on L side   Coordination [] [x] [] []    Tone [x] [] [] []    Edema [x] [] [] []    Activity Tolerance [] [x] [] []     [] [] [] []      COGNITION/  PERCEPTION: Intact Impaired   (see comments) Comments:   Orientation [] [x] Cues for month   Vision [x] []    Hearing [x] []    Judgment/ Insight [] [x]    Attention [] [x]    Memory [] [x]    Command Following [x] []    Emotional Regulation [x] []     [] []      ACTIVITIES OF DAILY LIVING: I Mod I S SBA CGA Min Mod Max Total NT Comments   BASIC ADLs:              Bathing/ Showering [] [] [] [] [] [] [] [] [] [x]    Toileting [] [] [] [] [] [] [] [] [] [x]    Dressing [] [] [] [] [] [] [] [] [] [x]    Feeding [] [] [] [] [] [] [] [] [] [x]    Grooming [] [] [] [] [] [] [] [] [] [x]    Personal Device Care [] [] [] [] [] [] [] [] [] [x]    Functional Mobility [] [] [] [] [] [x] [] [] [] []    I=Independent, Mod I=Modified Independent, S=Supervision, SBA=Standby Assistance, CGA=Contact Guard Assistance,   Min=Minimal Assistance, Mod=Moderate Assistance, Max=Maximal Assistance, Total=Total Assistance, NT=Not Tested    MOBILITY: I Mod I S SBA CGA Min Mod Max Total  NT x2 Comments:   Supine to sit [] [] [] [] [] [x] [] [] [] [] []    Sit to supine [] [] [] [] [] [] [] [] [] [x] []    Sit to stand [] [] [] [] [] [x] [] [] [] [] []    Bed to chair [] [] [] [] [] [x] [] [] [] [] []    I=Independent, Mod I=Modified Independent, S=Supervision, SBA=Standby Assistance, CGA=Contact Guard Assistance,   Min=Minimal Assistance, Mod=Moderate Assistance, Max=Maximal Assistance, Total=Total Assistance, NT=Not Tested    [address] [facility] [address] [address] AM-PAC 6 Clicks   Daily Activity Inpatient Short Form        How much help from another person does the patient currently need. .. Total A Lot A Little None   1. Putting on and taking off regular lower body clothing? [] 1   [x] 2   [] 3   [] 4   2. Bathing (including washing, rinsing, drying)? [] 1   [x] 2   [] 3   [] 4   3. Toileting, which includes using toilet, bedpan or urinal?   [] 1   [x] 2   [] 3   [] 4   4. Putting on and taking off regular upper body clothing? [] 1   [] 2   [x] 3   [] 4   5.   Taking care of personal grooming such as brushing teeth? [] 1   [] 2   [x] 3   [] 4   6. Eating meals? [] 1   [] 2   [x] 3   [] 4   © 2007, Trustees of [address] [place] [address] [address] [id number], under license to [employer]. All rights reserved     Score:  Initial: 15 Most Recent: X (Date: -- )   Interpretation of Tool:  Represents activities that are increasingly more difficult (i.e. Bed mobility, Transfers, Gait). PLAN:   FREQUENCY/DURATION: OT Plan of Care: 3 times/week for duration of hospital stay or until stated goals are met, whichever comes first.    PROBLEM LIST:   (Skilled intervention is medically necessary to address:)  1. Decreased ADL/Functional Activities  2. Decreased Activity Tolerance  3. Decreased AROM/PROM  4. Decreased Balance  5. Decreased Cognition  6. Decreased Coordination  7. Decreased Gait Ability  8. Decreased Strength  9. Decreased Transfer Abilities  10. Increased Pain   INTERVENTIONS PLANNED:   (Benefits and precautions of occupational therapy have been discussed with the patient.)  1. Self Care Training  2. Therapeutic Activity  3. Therapeutic Exercise/HEP  4. Neuromuscular Re-education  5. Education     TREATMENT:     EVALUATION: Moderate Complexity : (Untimed Charge)    TREATMENT:   ( $$ Neuromuscular Re-Education: 8-22 mins   )  Neuromuscular Re-education (8 Minutes): Neuromuscular Re-education included Balance Training, Coordination training, Postural training and Standing balance training to improve Balance, Coordination, Functional Mobility and Postural Control.     TREATMENT GRID:  N/A    AFTER TREATMENT POSITION/PRECAUTIONS:  Alarm Activated, Chair, Needs within reach and RN notified    INTERDISCIPLINARY COLLABORATION:  RN/PCT, PT/PTA and OT/MILIAN    TOTAL TREATMENT DURATION:  OT Patient Time In/Time Out  Time In: 1342  Time Out: Jose Trent

## 2021-05-05 LAB
GLUCOSE BLD STRIP.AUTO-MCNC: 117 MG/DL (ref 65–100)
GLUCOSE BLD STRIP.AUTO-MCNC: 65 MG/DL (ref 65–100)
GLUCOSE BLD STRIP.AUTO-MCNC: 95 MG/DL (ref 65–100)
GLUCOSE BLD STRIP.AUTO-MCNC: 99 MG/DL (ref 65–100)
MM INDURATION POC: 0 MM (ref 0–5)
PPD POC: NEGATIVE NEGATIVE
SARS-COV-2, COV2: NOT DETECTED
SERVICE CMNT-IMP: ABNORMAL
SERVICE CMNT-IMP: NORMAL
SPECIMEN SOURCE, FCOV2M: NORMAL

## 2021-05-05 PROCEDURE — 3331090001 HH PPS REVENUE CREDIT

## 2021-05-05 PROCEDURE — 74011250637 HC RX REV CODE- 250/637: Performed by: INTERNAL MEDICINE

## 2021-05-05 PROCEDURE — 3331090002 HH PPS REVENUE DEBIT

## 2021-05-05 PROCEDURE — 65270000029 HC RM PRIVATE

## 2021-05-05 PROCEDURE — 94760 N-INVAS EAR/PLS OXIMETRY 1: CPT

## 2021-05-05 PROCEDURE — 82962 GLUCOSE BLOOD TEST: CPT

## 2021-05-05 PROCEDURE — 99233 SBSQ HOSP IP/OBS HIGH 50: CPT | Performed by: INTERNAL MEDICINE

## 2021-05-05 PROCEDURE — 77010033678 HC OXYGEN DAILY

## 2021-05-05 RX ORDER — ASPIRIN 81 MG/1
81 TABLET ORAL DAILY
Status: DISCONTINUED | OUTPATIENT
Start: 2021-05-06 | End: 2021-05-10 | Stop reason: HOSPADM

## 2021-05-05 RX ORDER — ACETAMINOPHEN 325 MG/1
650 TABLET ORAL
Status: DISCONTINUED | OUTPATIENT
Start: 2021-05-05 | End: 2021-05-10 | Stop reason: HOSPADM

## 2021-05-05 RX ORDER — OXYCODONE HYDROCHLORIDE 5 MG/1
5 TABLET ORAL
Status: DISCONTINUED | OUTPATIENT
Start: 2021-05-05 | End: 2021-05-10 | Stop reason: HOSPADM

## 2021-05-05 RX ADMIN — DOXYCYCLINE HYCLATE 100 MG: 100 CAPSULE ORAL at 09:29

## 2021-05-05 RX ADMIN — OXYCODONE 5 MG: 5 TABLET ORAL at 14:13

## 2021-05-05 RX ADMIN — ALLOPURINOL 300 MG: 300 TABLET ORAL at 09:29

## 2021-05-05 RX ADMIN — METOPROLOL TARTRATE 25 MG: 25 TABLET, FILM COATED ORAL at 09:29

## 2021-05-05 RX ADMIN — DOXYCYCLINE HYCLATE 100 MG: 100 CAPSULE ORAL at 20:55

## 2021-05-05 RX ADMIN — FINASTERIDE 5 MG: 5 TABLET, FILM COATED ORAL at 09:29

## 2021-05-05 RX ADMIN — METOPROLOL TARTRATE 25 MG: 25 TABLET, FILM COATED ORAL at 20:56

## 2021-05-05 RX ADMIN — ASPIRIN 162 MG: 81 TABLET ORAL at 09:29

## 2021-05-05 RX ADMIN — LISINOPRIL 5 MG: 5 TABLET ORAL at 09:29

## 2021-05-05 RX ADMIN — APIXABAN 5 MG: 5 TABLET, FILM COATED ORAL at 20:55

## 2021-05-05 RX ADMIN — APIXABAN 5 MG: 5 TABLET, FILM COATED ORAL at 09:29

## 2021-05-05 RX ADMIN — ATORVASTATIN CALCIUM 80 MG: 80 TABLET, FILM COATED ORAL at 20:56

## 2021-05-05 NOTE — ROUTINE PROCESS
CHF teaching held: AMS noted. Introduction completed . Planner @ BS and will follow. Cardiac diet/ FR Palliative Care: ACP on file

## 2021-05-05 NOTE — INTERDISCIPLINARY ROUNDS
Interdisciplinary Rounds completed with Nursing, Case Management, Physician and PT/OT present. Plan of care reviewed and updated. Consults include PT/OT/WC. Cardiology Expected discharge date: TBD Location:REHAB, [facility] placed

## 2021-05-05 NOTE — PROGRESS NOTES
Bed offer accepted at [facility] for STR, per patient/cousin request. Jamin Ortega has been initiated by facility. CM will follow to assist with discharge when precert obtained.

## 2021-05-05 NOTE — PROGRESS NOTES
[facility] Hospitalist Progress Note    Patient: Shelly Ho MRN: [id number]  SSN: [id number]    YOB: 1940  Age: 79 y.o. Sex: male      Admit Date: 5/3/2021    LOS: 2 days     Subjective:     Chief Complaint: Confusion and SOB  Reason for Admission: Acute respiratory failure with hypoxia ([facility]    79 y.o. male with medical h/o chronic dCHF, COPD and diabetes who presented to [facility] ED with a complaint of abnormal behaviors, SOB and a general decline over the last several weeks. He was found to have acute respiratory failure due to acute dCHF and pneumonia. He was started on Lasix and Doxycyline. 5/5 - He feels better and is less confused today. Complains of left leg ulcer pain. Denies CP/SOB. Denies F/C. Denies N/V/D. Review of systems negative except stated above. Assessment/Plan (MDM):      Active Medical Complaints and Diagnoses:    Principal Problem:    Acute respiratory failure with hypoxia ([facility] (5/3/2021)  - Satting 85% on room air in ER  - Now on 4LNC satting 94%  - Due to acute dCHF + pneumonia  - Continue Doxycycline - 5/10/21  - Lasix PRN - per Cardiology  - Wean oxygen as appropriate    Active Problems:    Acute on chronic diastolic congestive heart failure ([facility] (10/27/2016)  - BNP 2,274 + CXR evidence  - 5L UOP since admit  - Lasix PRN - per Cardiology  - Continue Lopressor  - Continue Lisinopril      Pneumonia due to Escherichia coli ([facility] (5/3/2021)  - Seen on CXR  - Continue Doxycycline - EOT 5/10/21  - No sputum production currently      Acute metabolic encephalopathy (1/8/1145)  - Appears resolved  - 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, nothing acute  - Due to acute illness  - Continue current treatment      Diabetes mellitus type II, non insulin dependent ([facility] (7/23/2014)  - Stable  - Continue Humalog SSI      Paroxysmal atrial fibrillation ([facility] (6/12/2019)  - Stable  - Continue Lopressor  - Continue Eliquis      Stage 3 chronic kidney disease ([facility] (6/18/2020)  - Stable PVD (peripheral vascular disease) ([facility] (10/27/2016)      Hyperlipidemia (7/23/2014)      Class 1 obesity due to excess calories with serious comorbidity and body mass index (BMI) of 32.0 to 32.9 in adult (8/12/2019)      Otherwise all chronic medical issues appear stable with no changes. See assessment at bottom of progress note for complete acute, chronic, and resolved hospital medical issues. Diet: DIET CARDIAC Regular; Consistent Carb 1800kcal  VTE ppx: Eliquis    Objective:     Visit Vitals  BP (!) 95/56 (BP 1 Location: Left upper arm, BP Patient Position: Reclining)   Pulse 85   Temp 97.6 °F (36.4 °C)   Resp 17   Ht 6' (1.829 m)   Wt 102.4 kg (225 lb 12.8 oz)   SpO2 95%   BMI 30.62 kg/m²      Oxygen Therapy  O2 Sat (%): 95 % (05/05/21 1041)  Pulse via Oximetry: 71 beats per minute (05/05/21 1006)  O2 Device: Nasal cannula (05/04/21 1352)  Skin Assessment: Clean, dry, & intact (05/04/21 1048)  Skin Protection for O2 Device: No (05/04/21 1048)  O2 Flow Rate (L/min): 3 l/min (05/05/21 1006)  FIO2 (%): 32 % (05/05/21 1006)      Intake and Output:     Intake/Output Summary (Last 24 hours) at 5/5/2021 1211  Last data filed at 5/5/2021 0029  Gross per 24 hour   Intake    Output 1000 ml   Net -1000 ml         Physical Exam:   GENERAL: alert, cooperative, no distress, appears stated age  EYE: conjunctivae/corneas clear. PERRL. THROAT & NECK: normal and no erythema or exudates noted. LUNG: clear to auscultation bilaterally  HEART: irregular rate and rhythm, S1S2, no murmur, no JVD  ABDOMEN: soft, non-tender, non-distended. Bowel sounds normal.   EXTREMITIES:  Trace edema, Legs wrapped in ACE bandage  SKIN: no rash or abnormalities  NEUROLOGIC: A&Ox2-3. Cranial nerves 2-12 grossly intact.     Lab/Data Review:  Recent Results (from the past 24 hour(s))   PLEASE READ & DOCUMENT PPD TEST IN 24 HRS    Collection Time: 05/04/21  2:53 PM   Result Value Ref Range    PPD Negative Negative    mm Induration 0 0 - 5 mm GLUCOSE, POC    Collection Time: 05/04/21  4:05 PM   Result Value Ref Range    Glucose (POC) 100 65 - 100 mg/dL    Performed by Eugenio    SARS-COV-2    Collection Time: 05/04/21  4:50 PM   Result Value Ref Range    SARS-CoV-2 Please find results under separate order     COVID-19 RAPID TEST    Collection Time: 05/04/21  4:50 PM   Result Value Ref Range    Specimen source Nasopharyngeal      COVID-19 rapid test Not detected NOTD     SARS-COV-2, PCR    Collection Time: 05/04/21  4:50 PM    Specimen: Nasopharyngeal   Result Value Ref Range    Specimen source Nasopharyngeal      SARS-CoV-2 Not detected NOTD     GLUCOSE, POC    Collection Time: 05/04/21  9:03 PM   Result Value Ref Range    Glucose (POC) 86 65 - 100 mg/dL    Performed by Kathya Calix    GLUCOSE, POC    Collection Time: 05/05/21  7:18 AM   Result Value Ref Range    Glucose (POC) 65 65 - 100 mg/dL    [facility]    GLUCOSE, POC    Collection Time: 05/05/21 10:44 AM   Result Value Ref Range    Glucose (POC) 117 (H) 65 - 100 mg/dL    Performed by [facility]        SARS-CoV-2 Lab Results  \"Novel Coronavirus\" Test: No results found for: COV2NT   \"Emergent Disease\" Test: No results found for: EDPR  \"SARS-COV-2\" Test: No results found for: XGCOVT  \"Precision Labs\" Test: No results found for: RSLT  Rapid Test:   Lab Results   Component Value Date/Time    COVR Not detected 05/04/2021 04:50 PM           Imaging:  Xr Chest Pa Lat    Result Date: 4/28/2021  Chest 2 view CLINICAL INDICATION: Acute severe Weakness, difficulty walking, fell yesterday, altered mental status COMPARISON: 6/11/2020, 8/12/2019 TECHNIQUE: Sitting upright frontal and lateral views of the chest FINDINGS: Lung volumes are well inflated. Cardiomediastinal silhouette and hilar contours are stable. Minimal linear atelectasis or scarring is scattered in both bases. The lungs demonstrating no consolidation, pneumothorax, pleural effusion or CHF.   No acute osseous abnormalities are seen. No acute airspace disease. Ct Head Wo Cont    Result Date: 5/3/2021  EXAMINATION: HEAD CT WITHOUT CONTRAST 5/3/2021 9:34 AM ACCESSION NUMBER: [id number] INDICATION: ALTERED mental status COMPARISON: Brain MRI 12/5/2016, head CT 12/5/2016 TECHNIQUE: Multiple-row detector helical CT examination of the head without intravenous contrast. Radiation dose reduction techniques were used for this study:  Our CT scanners use one or all of the following: Automated exposure control, adjustment of the mA and/or kVp according to patient's size, iterative reconstruction. FINDINGS: The ventricles are stable in caliber in comparison to the prior exam and are appropriate for the mild degree of symmetric global brain parenchymal volume loss. There is no midline shift. The basilar cisterns are patent. There is no cerebellar tonsillar ectopia or herniation. Hypodensities in the periventricular and subcortical white matter are nonspecific, but they are most likely to represent chronic microangiopathy. Prior bilateral lens replacement. Subtle left precentral gyrus encephalomalacia at the site of the infarction demonstrated on 12/5/2016 exam.  There is no evidence of acute intracranial hemorrhage or extra-axial collections. There is no CT evidence of acute infarction. The paranasal sinuses and mastoid air cells are well aerated and clear. 1. No acute intracranial abnormality. 2. Chronic findings as above. VOICE DICTATED BY: Dr. Trav Alcala     Xr Chest Port    Result Date: 5/3/2021  CHEST X-RAY, single portable view  5/3/2021 History: Altered mental status/confusion. Technique: Single frontal view of the chest. Comparison: Chest x-ray 4/28/2020 Findings: The cardiac silhouette is moderately enlarged although stable. The lungs are expanded without evidence for pneumothorax. New mild left basilar airspace changes and small left basilar effusion are seen.      1.  Stable cardiomegaly with new mild left basilar airspace changes and small left basilar effusion. Given the patient's acute symptoms, this is felt to most likely to represent pneumonia versus an atypical presentation of heart failure. Recommend clinical correlation. This report was made using voice transcription. Despite my best efforts to avoid any, transcription errors may persist. If there is any question about the accuracy of the report or need for clarification, then please call [phone], or text me through perfectserv for clarification or correction. Xr Knee Lt 3 V    Result Date: 2021  EXAM:  XR KNEE LT 3 V INDICATION:   Left knee pain after fall COMPARISON: None. FINDINGS: Three views of the left knee demonstrate end-stage tricompartmental secondary osteoarthritis. Small joint effusion. No evidence of fractures or dislocations. .      End-stage tricompartmental secondary osteoarthritis with small joint effusion. Results for orders placed or performed during the hospital encounter of 21   2D ECHO COMPLETE ADULT (TTE) W OR [address]  [phone]    Transthoracic Echocardiogram  2D, M-mode, Doppler, and Color Doppler    Patient: Wilfred Ivy  MR #: [id number]  : 1940  Age: 79 years  Gender: Male  Study date: 03-May-2021  Account #: [de-identified]  Height: 72 in  Weight: 229.5 lb  BSA: 2.26 mï¾²  Status:Routine  Location: 609  BP: 138/ 70    Allergies: NO KNOWN ALLERGENS    Sonographer:  Patric Cortes  Group:  [facility]  Referring Physician:  Florina Mcknight. Norm Dalton MD  Reading Physician:  DR. NAE PIMENTEL DO    INDICATIONS: Heart failure. Systolic, Chronic. PROCEDURE: This was a routine study. A transthoracic echocardiogram was  performed. The study included complete 2D imaging, M-mode, complete spectral  Doppler, and color Doppler. Intravenous contrast (Definity) was administered.   This was a technically difficult study. LEFT VENTRICLE: Size was normal. Systolic function was at the lower limits of  normal. Ejection fraction was estimated in the range of 50 % to 55 %. There  were no regional wall motion abnormalities. Septal flattening consistent with  RV pressure overload. Wall thickness was normal. The study was not   technically  sufficient to allow evaluation of LV diastolic function. RIGHT VENTRICLE: The ventricle was dilated. Systolic function was normal.  Estimated peak pressure was in the range of 90-95 mmHg. LEFT ATRIUM: Size was normal.    RIGHT ATRIUM: The atrium was mildly dilated. SYSTEMIC VEINS: IVC: The inferior vena cava was dilated. The respirophasic  change in diameter was less than 50%. AORTIC VALVE: The valve was trileaflet. Leaflets exhibited mildly increased  thickness. There was no evidence for stenosis. There was mild regurgitation. Pressure half time 861 msec. MITRAL VALVE: Valve structure was normal. There was no evidence for stenosis. There was mild regurgitation. TRICUSPID VALVE: The valve structure was normal. There was no evidence for  stenosis. There was moderate to severe regurgitation. PULMONIC VALVE: The valve structure was normal. There was no evidence for  stenosis. There was mild regurgitation. PERICARDIUM: There was no pericardial effusion. AORTA: The root exhibited normal size. SUMMARY:    -  Left ventricle: Systolic function was at the lower limits of normal.  Ejection fraction was estimated in the range of 50 % to 55 %. There were no  regional wall motion abnormalities. Septal flattening consistent with RV  pressure overload. -  Right ventricle: The ventricle was dilated. Estimated peak pressure was in  the range of 90-95 mmHg. -  Right atrium: The atrium was mildly dilated. -  Inferior vena cava, hepatic veins: The inferior vena cava was dilated. The  respirophasic change in diameter was less than 50%.     -  Mitral valve: There was mild regurgitation.    -  Tricuspid valve: There was moderate to severe regurgitation. SYSTEM MEASUREMENT TABLES    2D mode  AoR Diam (2D): 2.7 cm  LA Dimension (2D): 3.7 cm  Left Atrium Systolic Volume Index; Method of Disks, Biplane; 2D mode;: 21.6  ml/m2  IVS/LVPW (2D): 1  IVSd (2D): 0.9 cm  LVIDd (2D): 4.5 cm  LVIDs (2D): 2.3 cm  LVPWd (2D): 0.9 cm  RVIDd (2D): 4.1 cm    Unspecified Scan Mode  Peak Grad; Mean; Antegrade Flow: 11 mm[Hg]  Vmax; Antegrade Flow: 169 cm/s    Prepared and signed by     [address] [place] [address], DO  Signed 03-May-2021 16:15:09         Cultures: All Micro Results     Procedure Component Value Units Date/Time    SARS-COV-2, PCR [[id number]] Collected: 05/04/21 1650    Order Status: Completed Specimen: Nasopharyngeal Updated: 05/05/21 1042     Specimen source Nasopharyngeal        SARS-CoV-2 Not detected        Comment:      The specimen is NEGATIVE for SARS-CoV-2, the novel coronavirus associated with COVID-19. This test has been authorized by the FDA under an Emergency Use Authorization (EUA) for use by authorized laboratories. Fact sheet for Healthcare Providers: ConventionUpdate.co.nz       Fact sheet for Patients: ConventionUpdate.co.nz       Methodology: RT-PCR         CULTURE, BLOOD [[id number]] Collected: 05/03/21 1352    Order Status: Completed Specimen: Blood Updated: 05/05/21 0845     Special Requests: --        RIGHT  Antecubital       Culture result: NO GROWTH 2 DAYS       CULTURE, URINE [[id number]] Collected: 05/03/21 0948    Order Status: Completed Specimen: Cath Urine Updated: 05/05/21 0836     Special Requests: NO SPECIAL REQUESTS        Culture result: NO GROWTH 1 DAY       COVID-19 RAPID TEST [[id number]] Collected: 05/04/21 1650    Order Status: Completed Specimen: Nasopharyngeal Updated: 05/04/21 1828     Specimen source Nasopharyngeal        COVID-19 rapid test Not detected        Comment:       The specimen is NEGATIVE for SARS-CoV-2, the novel coronavirus associated with COVID-19. A negative result does not rule out COVID-19. This test has been authorized by the FDA under an Emergency Use Authorization (EUA) for use by authorized laboratories.         Fact sheet for Healthcare Providers: ConventionUpdate.co.nz  Fact sheet for Patients: ConventionUpdate.co.nz       Methodology: Isothermal Nucleic Acid Amplification               Assessment:     Primary Diagnosis: Acute respiratory failure with hypoxia [facility]    Hospital Problems as of 5/5/2021 Date Reviewed: 12/21/2020          Codes Class Noted - Resolved POA    * (Principal) Acute respiratory failure with hypoxia ([facility] ICD-10-CM: J96.01  ICD-9-CM: 518.81  5/3/2021 - Present Yes        Pneumonia due to Escherichia coli [facility]) ICD-10-CM: J15.5  ICD-9-CM: 482.82  5/3/2021 - Present Yes        Acute on chronic diastolic congestive heart failure ([facility] ICD-10-CM: I50.33  ICD-9-CM: 428.33, 428.0  10/27/2016 - Present Yes        Acute metabolic encephalopathy RAE-63-FB: G93.41  ICD-9-CM: 348.31  5/3/2021 - Present Yes        Stage 3 chronic kidney disease ([facility] ICD-10-CM: N18.30  ICD-9-CM: 585.3  6/18/2020 - Present Yes        Paroxysmal atrial fibrillation ([facility] ICD-10-CM: I48.0  ICD-9-CM: 427.31  6/12/2019 - Present Yes        Diabetes mellitus type II, non insulin dependent (HCC) (Chronic) ICD-10-CM: E11.9  ICD-9-CM: 250.00  7/23/2014 - Present Yes        PVD (peripheral vascular disease) ([facility] ICD-10-CM: I73.9  ICD-9-CM: 443.9  10/27/2016 - Present Yes        Class 1 obesity due to excess calories with serious comorbidity and body mass index (BMI) of 32.0 to 32.9 in adult ICD-10-CM: E66.09, G42.29  ICD-9-CM: 278.00, V85.32  8/12/2019 - Present Yes        Hyperlipidemia (Chronic) ICD-10-CM: E78.5  ICD-9-CM: 272.4  7/23/2014 - Present Yes        RESOLVED: Hallucinations ICD-10-CM: R44.3  ICD-9-CM: 780.1  5/3/2021 - 5/4/2021 Yes                Discharge Plan:     STR in 1-2 days    Signed By: Todd Killian DO     May 5, 2021

## 2021-05-05 NOTE — PROGRESS NOTES
[facility] CARDIOLOGY PROGRESS NOTE           5/5/2021 9:34 AM    Admit Date: 5/3/2021      Subjective: The patient does not report angina, shortness of breath at rest, or palpitations. ROS:  Constitutional:   Negative unexplained weight loss. Eyes:   Negative for unexplained blindness. ENT:   Negative for unexplained hearing loss. Respiratory:   Negative for unexplained hemoptysis. Cardiovascular:   Negative except as noted in HPI. Gastrointestinal:   Negative for unexplained vomiting. Genitourinary:   Negative for unexplained hematuria. Integumentary:   Negative for unexplained rash. Hematologic/Lymphatic:   Negative for unexplained excessive bleeding. Musculoskeletal:  Negative for unexplained joint pain. Neurological:   Negative for stroke. Behavioral/Psych:   Negative for suicidal ideations. Endocrine:   Negative for uncontrolled diabetic symptoms including polyuria, polydipsia. Objective:      Vitals:    05/04/21 2016 05/05/21 0027 05/05/21 0259 05/05/21 0715   BP: 102/68 105/61 110/65 124/80   Pulse: 79 76 79 73   Resp: 17 17 17 17   Temp: 97.9 °F (36.6 °C) 98.2 °F (36.8 °C) 98 °F (36.7 °C) 98.3 °F (36.8 °C)   SpO2: 95% 95% 95% 95%   Weight:   225 lb 12.8 oz (102.4 kg)    Height:           Physical Exam:  General-No Acute Distress  Neck- supple, no JVD  CV- irregularly irregular, no RG  Lung- clear bilaterally  Abd- soft, nontender, nondistended  Ext- no edema bilaterally.   Skin- warm and dry    Data Review:   Recent Labs     05/04/21  0637 05/03/21  0815   NA  --  142   K  --  4.4   BUN  --  25*   CREA  --  1.41   GLU  --  70   WBC 6.5 5.6   HGB 14.0 13.6   HCT 42.0 41.5   * 132*       Assessment/Plan:     Permanent atrial fibrillation  - Continue with Eliquis and metoprolol tartrate    Hypertension   - Continue with lisinopril    Pulmonary hypertension  - EF 50 to 55%  - RVSP documented to be over 90 mmHg with a dilated RV on echocardiogram and plethoric IVC  - Received IV Lasix on May 3 (off for the last 2 days)  - Outpatient follow-up with cardiology: Saw Dr. [place] Allen in 2017    Pneumonia  - On antibiotics  - Management per hospitalist service    Acute respiratory failure with hypoxia  - Multifactorial with patient on antibiotics for pneumonia and currently off diuretics    History of CVA  - Although the optimal dose of aspirin is uncertain, there is no compelling evidence that any specific dose is more effective than another, and fewer gastrointestinal side effects and bleeding occur with lower doses (?325 mg a day); thus, it is a strong recommendation to choose a dose of 81 mg daily when using aspirin for the secondary prevention of ischemic stroke  - Consider decreasing the dose of of aspirin to 81 mg daily    Dar Webber MD

## 2021-05-06 LAB
BACTERIA SPEC CULT: NORMAL
GLUCOSE BLD STRIP.AUTO-MCNC: 106 MG/DL (ref 65–100)
GLUCOSE BLD STRIP.AUTO-MCNC: 76 MG/DL (ref 65–100)
GLUCOSE BLD STRIP.AUTO-MCNC: 77 MG/DL (ref 65–100)
GLUCOSE BLD STRIP.AUTO-MCNC: 86 MG/DL (ref 65–100)
MM INDURATION POC: 0 MM (ref 0–5)
PPD POC: NEGATIVE NEGATIVE
SERVICE CMNT-IMP: ABNORMAL
SERVICE CMNT-IMP: NORMAL

## 2021-05-06 PROCEDURE — 82962 GLUCOSE BLOOD TEST: CPT

## 2021-05-06 PROCEDURE — 99232 SBSQ HOSP IP/OBS MODERATE 35: CPT | Performed by: INTERNAL MEDICINE

## 2021-05-06 PROCEDURE — 3331090001 HH PPS REVENUE CREDIT

## 2021-05-06 PROCEDURE — 97110 THERAPEUTIC EXERCISES: CPT

## 2021-05-06 PROCEDURE — 65270000029 HC RM PRIVATE

## 2021-05-06 PROCEDURE — 3331090002 HH PPS REVENUE DEBIT

## 2021-05-06 PROCEDURE — 97530 THERAPEUTIC ACTIVITIES: CPT

## 2021-05-06 PROCEDURE — 74011250637 HC RX REV CODE- 250/637: Performed by: INTERNAL MEDICINE

## 2021-05-06 RX ADMIN — METOPROLOL TARTRATE 25 MG: 25 TABLET, FILM COATED ORAL at 09:42

## 2021-05-06 RX ADMIN — DOXYCYCLINE HYCLATE 100 MG: 100 CAPSULE ORAL at 09:42

## 2021-05-06 RX ADMIN — ATORVASTATIN CALCIUM 80 MG: 80 TABLET, FILM COATED ORAL at 22:02

## 2021-05-06 RX ADMIN — ALLOPURINOL 300 MG: 300 TABLET ORAL at 09:42

## 2021-05-06 RX ADMIN — METOPROLOL TARTRATE 25 MG: 25 TABLET, FILM COATED ORAL at 22:02

## 2021-05-06 RX ADMIN — DOXYCYCLINE HYCLATE 100 MG: 100 CAPSULE ORAL at 22:02

## 2021-05-06 RX ADMIN — APIXABAN 5 MG: 5 TABLET, FILM COATED ORAL at 09:42

## 2021-05-06 RX ADMIN — FINASTERIDE 5 MG: 5 TABLET, FILM COATED ORAL at 09:42

## 2021-05-06 RX ADMIN — ASPIRIN 81 MG: 81 TABLET ORAL at 09:42

## 2021-05-06 RX ADMIN — APIXABAN 5 MG: 5 TABLET, FILM COATED ORAL at 22:02

## 2021-05-06 RX ADMIN — OXYCODONE 5 MG: 5 TABLET ORAL at 09:42

## 2021-05-06 NOTE — PROGRESS NOTES
[facility] CARDIOLOGY PROGRESS NOTE           5/6/2021 1:33 PM    Admit Date: 5/3/2021      Subjective:   Up in chair. Back on O2 at 2L. Denies any chest pain, heart palpitations or SOB. Remote tele reviewed and controlled a.fib rates. ROS:  Cardiovascular:  As noted above    Objective:      Vitals:    05/06/21 0337 05/06/21 0512 05/06/21 0747 05/06/21 1132   BP: 124/78  114/71 (!) 105/59   Pulse: 85  95 93   Resp: 18  20 20   Temp: 97.9 °F (36.6 °C)  98.3 °F (36.8 °C) 97.4 °F (36.3 °C)   SpO2: 94%  95% 93%   Weight:  88.5 kg (195 lb)     Height:           Physical Exam:  General-No Acute Distress  Neck- supple, no JVD  CV- irregular rate and rhythm no MRG  Lung- diminished in bases. Bruised area left upper back. Abd- soft, nontender, nondistended  Ext- bilateral Legs wrapped with no real edema. Skin- warm and dry    Data Review:   Recent Labs     05/04/21  0637   WBC 6.5   HGB 14.0   HCT 42.0   *       Assessment/Plan:     Principal Problem:    Acute respiratory failure with hypoxia ([facility] (5/3/2021)- improving; per primary team .       Active Problems:    Diabetes mellitus type II, non insulin dependent ([address] (7/23/2014)          Hyperlipidemia (7/23/2014)         PVD (peripheral vascular disease) ([address] (10/27/2016)          Acute on chronic diastolic congestive heart failure ([address] (10/27/2016)          Paroxysmal atrial fibrillation ([address] (6/12/2019)-        Class 1 obesity due to excess calories with serious comorbidity and body mass index (BMI) of 32.0 to 32.9 in adult (8/12/2019)          Stage 3 chronic kidney disease ([address] (6/18/2020)          Pneumonia due to Escherichia coli ([facility] (5/3/2021)          Acute metabolic encephalopathy (3/0/9323)        Plan: His resp failure and SOB has improved. Likely multifactorial including volume overload, severe Pul HTN and PNA. Echo with RVSP 90-95 appears to be on Ravatio as an outpt but no work up noted.  His volume overload improved, he has diuresed over 6 liters and been off lasix for several days. He's on O2 at 2L today. His permanent A. Fib is controlled with lopressor and will also continue eliquis. Can f/u with Dr. Bhavesh Santiago post STR. Martha Lara NP  5/6/2021 1:33 PM    I have personally seen and examined patient and agree with above assessment. I agree and confirm with findings with additional details/exceptions as listed below:    Exam currently not grossly volume overloaded but appears good diuretic response on presentation.  ~6L net neg. Chest x-ray reviewed with left-sided small pleural effusion and likely contributed by severely elevated RVSP. Noted progression in RVSP since echocardiogram from 8/2019 and possibly contributed by hypoxia. Consideration for assessing for PE with noted change and RVSP/presenting hypoxia but on anticoagulation at this time and will not . Will need evaluation for pulmonary hypertension which can be done as an outpatient. Appears on Revatio as an outpatient but no work-up noted. Avoid restarting at this time until etiology of pulmonary hypertension ascertained. Cautious with aggressive diuresis in the setting of severely elevated RVSP. Adequate rate control at this time. Discussed with patient's family today.   Will need close follow-up with cardiology in the outpatient setting/work-up for pulmonary hypertension    Willi Holder MD  5/6/2021

## 2021-05-06 NOTE — PROGRESS NOTES
ACUTE OT GOALS:  (Developed with and agreed upon by patient and/or caregiver.)      1. Patient will complete lower body bathing and dressing with minimal assistance and adaptive equipment as needed. 2. Patient will complete toileting and toileting transfers with CGA. 3. Patient will tolerate 30 minutes of OT treatment with 2-3 rest breaks to increase activity tolerance for ADLs. 4. Patient will complete functional transfers with supervision and adaptive equipment as needed. 5. Patient will complete functional mobility for household distances with modified independence and good safety awareness. OCCUPATIONAL THERAPY: Daily Note OT Treatment Day # 2    Lili Palacios is a 79 y.o. male   PRIMARY DIAGNOSIS: Acute respiratory failure with hypoxia ([facility] [address]  Acute metabolic encephalopathy [B44.20]  Respiratory failure with hypoxia (HCC) [J96.91]  CHF (congestive heart failure) ([facility] [address] [I50.9]  Pneumonia [J18.9]  Hallucinations [R44.3]       Payor: [employer] MMP / Plan: [place] D[place] [employer] MMP / Product Type: Managed Care Medicare /   ASSESSMENT:     REHAB RECOMMENDATIONS: CURRENT LEVEL OF FUNCTION:  (Most Recently Demonstrated)   Recommendation to date pending progress:  Setting:   Short-term Rehab  Equipment:    Rolling Walker Bathing:   Not tested  Dressing:   Not tested  Feeding/Grooming:   Not tested  Toileting:   Not tested  Functional Mobility:   Not tested     ASSESSMENT:  Mr. Abdon Farias agreeable to exercises. Pt did not want to complete any further mobility. Good effort. Continue POC. SUBJECTIVE:   Mr. Abdon Farias states, \"I walked earlier. \"    SOCIAL HISTORY/LIVING ENVIRONMENT:   Home Environment: Apartment  # Steps to Enter: 0  One/Two Story Residence: One story  Living Alone: Yes  Support Systems: Family member(s)    OBJECTIVE:     PAIN: VITAL SIGNS: LINES/DRAINS:   Pre Treatment: Pain Screen  Pain Scale 1: Numeric (0 - 10)  Pain Intensity 1: 0  Post Treatment: none   none  O2 Device: None (Room air)     ACTIVITIES OF DAILY LIVING: I Mod I S SBA CGA Min Mod Max Total NT Comments   BASIC ADLs:              Bathing/ Showering [] [] [] [] [] [] [] [] [] [x]    Toileting [] [] [] [] [] [] [] [] [] [x]    Dressing [] [] [] [] [] [] [] [] [] [x]    Feeding [] [] [] [] [] [] [] [] [] [x]    Grooming [] [] [] [] [] [] [] [] [] [x]    Personal Device Care [] [] [] [] [] [] [] [] [] []    Functional Mobility [] [] [] [] [] [] [] [] [] []    I=Independent, Mod I=Modified Independent, S=Supervision, SBA=Standby Assistance, CGA=Contact Guard Assistance,   Min=Minimal Assistance, Mod=Moderate Assistance, Max=Maximal Assistance, Total=Total Assistance, NT=Not Tested    MOBILITY: I Mod I S SBA CGA Min Mod Max Total  NT x2 Comments:   Supine to sit [] [] [] [] [] [] [] [] [] [x] []    Sit to supine [] [] [] [] [] [] [] [] [] [x] []    Sit to stand [] [] [] [] [] [] [] [] [] [x] []    Bed to chair [] [] [] [] [] [] [] [] [] [x] []    I=Independent, Mod I=Modified Independent, S=Supervision, SBA=Standby Assistance, CGA=Contact Guard Assistance,   Min=Minimal Assistance, Mod=Moderate Assistance, Max=Maximal Assistance, Total=Total Assistance, NT=Not Tested    BALANCE: Good Fair+ Fair Fair- Poor NT Comments   Sitting Static [] [] [] [] [] [x]    Sitting Dynamic [] [] [] [] [] [x]              Standing Static [] [] [] [] [] [x]    Standing Dynamic [] [] [] [] [] [x]      PLAN:   FREQUENCY/DURATION: OT Plan of Care: 3 times/week for duration of hospital stay or until stated goals are met, whichever comes first.    TREATMENT:   TREATMENT:   ( $$ Therapeutic Exercises: 23-37 mins   )  Therapeutic Exercise (25 Minutes): Therapeutic exercises noted below to improve functional activity tolerance, AROM and strength.    TREATMENT GRID:   Date:  5/6/21 Date:   Date:     Activity/Exercise Parameters Parameters Parameters   Shoulder flexion 20 reps with red theraband     Elbow flexion 20 reps with red theraband     Tricep extensions 20 reps with red theraband     Horizontal add/abd 20 reps with red theraband     Punches 20 reps with red theraband                         AFTER TREATMENT POSITION/PRECAUTIONS:  Chair, Needs within reach and RN notified    INTERDISCIPLINARY COLLABORATION:  RN/PCT and OT/MILIAN    TOTAL TREATMENT DURATION:  OT Patient Time In/Time Out  Time In: [facility]  Time Out: [address] [facility] [facility]

## 2021-05-06 NOTE — PROGRESS NOTES
[facility] Hospitalist Progress Note    Patient: Iban Pedraza MRN: [id number]  SSN: [id number]    YOB: 1940  Age: 79 y.o. Sex: male      Admit Date: 5/3/2021    LOS: 3 days     Subjective:     Chief Complaint: Confusion and SOB  Reason for Admission: Acute respiratory failure with hypoxia ([facility] [address]    79 y.o. male with medical h/o chronic dCHF, COPD and diabetes who presented to [facility] ED with a complaint of abnormal behaviors, SOB and a general decline over the last several weeks. He was found to have acute respiratory failure due to acute dCHF and pneumonia. He was started on Lasix and Doxycyline. 5/6 - He feels good today. Left leg ulcer pain improved. Nausea resolved. Denies CP/SOB. Denies F/C. Denies V/D. Review of systems negative except stated above. Assessment/Plan (MDM):      Active Medical Complaints and Diagnoses:    Principal Problem:    Acute respiratory failure with hypoxia ([facility] [address] (5/3/2021)  - Satting 85% on room air in ER  - Resolved, now on room air  - Due to acute dCHF + pneumonia  - Continue Doxycycline - 5/10/21  - Lasix PRN - per Cardiology  - Wean oxygen as appropriate    Active Problems:    Acute on chronic diastolic congestive heart failure ([facility] [address] (10/27/2016)  - BNP 2,274 + CXR evidence  - 5L UOP since admit  - Lasix PRN - per Cardiology  - Continue Lopressor  - Continue Lisinopril      Pneumonia due to Escherichia coli ([facility] [address] (5/3/2021)  - Seen on CXR  - Continue Doxycycline - EOT 5/10/21  - No sputum production currently      Acute metabolic encephalopathy (9/8/2704)  - Appears resolved  - 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, nothing acute  - Due to acute illness  - Continue current treatment      Diabetes mellitus type II, non insulin dependent ([facility] [address] (7/23/2014)  - Stable  - Continue Humalog SSI      Paroxysmal atrial fibrillation ([address] (6/12/2019)  - Stable  - Continue Lopressor  - Continue Eliquis      Stage 3 chronic kidney disease ([facility] [address] (6/18/2020)  - Stable      PVD (peripheral vascular disease) ([facility] [address] (10/27/2016)      Hyperlipidemia (7/23/2014)      Class 1 obesity due to excess calories with serious comorbidity and body mass index (BMI) of 32.0 to 32.9 in adult (8/12/2019)      Otherwise all chronic medical issues appear stable with no changes. See assessment at bottom of progress note for complete acute, chronic, and resolved hospital medical issues. Diet: DIET CARDIAC Regular; Consistent Carb 1800kcal  VTE ppx: Eliquis    Objective:     Visit Vitals  BP (!) 105/59 (BP 1 Location: Left arm, BP Patient Position: Sitting)   Pulse 93   Temp 97.4 °F (36.3 °C)   Resp 20   Ht 6' (1.829 m)   Wt 88.5 kg (195 lb)   SpO2 93%   BMI 26.45 kg/m²      Oxygen Therapy  O2 Sat (%): 93 % (05/06/21 1132)  Pulse via Oximetry: 71 beats per minute (05/05/21 1006)  O2 Device: None (Room air) (05/06/21 1047)  Skin Assessment: Clean, dry, & intact (05/04/21 1048)  Skin Protection for O2 Device: No (05/04/21 1048)  O2 Flow Rate (L/min): 3 l/min (05/05/21 1006)  FIO2 (%): 32 % (05/05/21 1006)      Intake and Output:     Intake/Output Summary (Last 24 hours) at 5/6/2021 1209  Last data filed at 5/6/2021 2027  Gross per 24 hour   Intake 480 ml   Output 900 ml   Net -420 ml         Physical Exam:   GENERAL: alert, cooperative, no distress, appears stated age  EYE: conjunctivae/corneas clear. PERRL. THROAT & NECK: normal and no erythema or exudates noted. LUNG: clear to auscultation bilaterally  HEART: irregular rate and rhythm, S1S2, no murmur, no JVD  ABDOMEN: soft, non-tender, non-distended. Bowel sounds normal.   EXTREMITIES:  Trace edema, Legs wrapped in ACE bandage  SKIN: no rash or abnormalities  NEUROLOGIC: A&Ox2-3. Cranial nerves 2-12 grossly intact.     Lab/Data Review:  Recent Results (from the past 24 hour(s))   PLEASE READ & DOCUMENT PPD TEST IN 48 HRS    Collection Time: 05/05/21  2:14 PM   Result Value Ref Range    PPD Negative Negative    mm Induration 0 0 - 5 mm   GLUCOSE, POC Collection Time: 05/05/21  4:17 PM   Result Value Ref Range    Glucose (POC) 99 65 - 100 mg/dL    Performed by Louis    GLUCOSE, POC    Collection Time: 05/05/21  8:49 PM   Result Value Ref Range    Glucose (POC) 95 65 - 100 mg/dL    Performed by [place] GLUCOSE, POC    Collection Time: 05/06/21  7:45 AM   Result Value Ref Range    Glucose (POC) 77 65 - 100 mg/dL    Performed by Margarito    GLUCOSE, POC    Collection Time: 05/06/21 11:32 AM   Result Value Ref Range    Glucose (POC) 106 (H) 65 - 100 mg/dL    Performed by Margarito        SARS-CoV-2 Lab Results  \"Novel Coronavirus\" Test: No results found for: COV2NT   \"Emergent Disease\" Test: No results found for: EDPR  \"SARS-COV-2\" Test: No results found for: XGCOVT  \"[facility]\" Test: No results found for: RSLT  Rapid Test:   Lab Results   Component Value Date/Time    COVR Not detected 05/04/2021 04:50 PM           Imaging:  Xr Chest Pa Lat    Result Date: 4/28/2021  Chest 2 view CLINICAL INDICATION: Acute severe Weakness, difficulty walking, fell yesterday, altered mental status COMPARISON: 6/11/2020, 8/12/2019 TECHNIQUE: Sitting upright frontal and lateral views of the chest FINDINGS: Lung volumes are well inflated. Cardiomediastinal silhouette and hilar contours are stable. Minimal linear atelectasis or scarring is scattered in both bases. The lungs demonstrating no consolidation, pneumothorax, pleural effusion or CHF. No acute osseous abnormalities are seen. No acute airspace disease.      Ct Head Wo Cont    Result Date: 5/3/2021  EXAMINATION: HEAD CT WITHOUT CONTRAST 5/3/2021 9:34 AM ACCESSION NUMBER: [id number] INDICATION: ALTERED mental status COMPARISON: Brain MRI 12/5/2016, head CT 12/5/2016 TECHNIQUE: Multiple-row detector helical CT examination of the head without intravenous contrast. Radiation dose reduction techniques were used for this study:  Our CT scanners use one or all of the following: Automated exposure control, adjustment of the mA and/or kVp according to patient's size, iterative reconstruction. FINDINGS: The ventricles are stable in caliber in comparison to the prior exam and are appropriate for the mild degree of symmetric global brain parenchymal volume loss. There is no midline shift. The basilar cisterns are patent. There is no cerebellar tonsillar ectopia or herniation. Hypodensities in the periventricular and subcortical white matter are nonspecific, but they are most likely to represent chronic microangiopathy. Prior bilateral lens replacement. Subtle left precentral gyrus encephalomalacia at the site of the infarction demonstrated on 12/5/2016 exam.  There is no evidence of acute intracranial hemorrhage or extra-axial collections. There is no CT evidence of acute infarction. The paranasal sinuses and mastoid air cells are well aerated and clear. 1. No acute intracranial abnormality. 2. Chronic findings as above. VOICE DICTATED BY: Dr. Florencio Mathews     Xr Chest Port    Result Date: 5/3/2021  CHEST X-RAY, single portable view  5/3/2021 History: Altered mental status/confusion. Technique: Single frontal view of the chest. Comparison: Chest x-ray 4/28/2020 Findings: The cardiac silhouette is moderately enlarged although stable. The lungs are expanded without evidence for pneumothorax. New mild left basilar airspace changes and small left basilar effusion are seen. 1.  Stable cardiomegaly with new mild left basilar airspace changes and small left basilar effusion. Given the patient's acute symptoms, this is felt to most likely to represent pneumonia versus an atypical presentation of heart failure. Recommend clinical correlation. This report was made using voice transcription.  Despite my best efforts to avoid any, transcription errors may persist. If there is any question about the accuracy of the report or need for clarification, then please call [phone], or text me through perfectserv for clarification or correction. Xr Knee Lt 3 V    Result Date: 2021  EXAM:  XR KNEE LT 3 V INDICATION:   Left knee pain after fall COMPARISON: None. FINDINGS: Three views of the left knee demonstrate end-stage tricompartmental secondary osteoarthritis. Small joint effusion. No evidence of fractures or dislocations. .      End-stage tricompartmental secondary osteoarthritis with small joint effusion. Results for orders placed or performed during the hospital encounter of 21   2D ECHO COMPLETE ADULT (TTE) W OR [address] [facility]  One [address] [facility], [address] [facility]  [phone]    Transthoracic Echocardiogram  2D, M-mode, Doppler, and Color Doppler    Patient: Lili Pelayo  MR #: [id number]  : 1940  Age: 79 years  Gender: Male  Study date: 03-May-2021  Account #: [de-identified]  Height: 72 in  Weight: 229.5 lb  BSA: 2.26 mï¾²  Status:Routine  Location: [facility]  BP: 138/ 70    Allergies: NO KNOWN ALLERGENS    Sonographer:  Claudia Cain  Group:  [facility]  Referring Physician:  Jeanine Almeida. Tenzin Gonzalez MD  Reading Physician:  DR. NAE PIMENTEL DO    INDICATIONS: Heart failure. Systolic, Chronic. PROCEDURE: This was a routine study. A transthoracic echocardiogram was  performed. The study included complete 2D imaging, M-mode, complete spectral  Doppler, and color Doppler. Intravenous contrast (Definity) was administered. This was a technically difficult study. LEFT VENTRICLE: Size was normal. Systolic function was at the lower limits of  normal. Ejection fraction was estimated in the range of 50 % to 55 %. There  were no regional wall motion abnormalities. Septal flattening consistent with  RV pressure overload. Wall thickness was normal. The study was not   technically  sufficient to allow evaluation of LV diastolic function. RIGHT VENTRICLE: The ventricle was dilated.  Systolic function was normal.  Estimated peak pressure was in the range of 90-95 mmHg. LEFT ATRIUM: Size was normal.    RIGHT ATRIUM: The atrium was mildly dilated. SYSTEMIC VEINS: IVC: The inferior vena cava was dilated. The respirophasic  change in diameter was less than 50%. AORTIC VALVE: The valve was trileaflet. Leaflets exhibited mildly increased  thickness. There was no evidence for stenosis. There was mild regurgitation. Pressure half time 861 msec. MITRAL VALVE: Valve structure was normal. There was no evidence for stenosis. There was mild regurgitation. TRICUSPID VALVE: The valve structure was normal. There was no evidence for  stenosis. There was moderate to severe regurgitation. PULMONIC VALVE: The valve structure was normal. There was no evidence for  stenosis. There was mild regurgitation. PERICARDIUM: There was no pericardial effusion. AORTA: The root exhibited normal size. SUMMARY:    -  Left ventricle: Systolic function was at the lower limits of normal.  Ejection fraction was estimated in the range of 50 % to 55 %. There were no  regional wall motion abnormalities. Septal flattening consistent with RV  pressure overload. -  Right ventricle: The ventricle was dilated. Estimated peak pressure was in  the range of 90-95 mmHg. -  Right atrium: The atrium was mildly dilated. -  Inferior vena cava, hepatic veins: The inferior vena cava was dilated. The  respirophasic change in diameter was less than 50%. -  Mitral valve: There was mild regurgitation.    -  Tricuspid valve: There was moderate to severe regurgitation. SYSTEM MEASUREMENT TABLES    2D mode  AoR Diam (2D): 2.7 cm  LA Dimension (2D): 3.7 cm  Left Atrium Systolic Volume Index; Method of Disks, Biplane; 2D mode;: 21.6  ml/m2  IVS/LVPW (2D): 1  IVSd (2D): 0.9 cm  LVIDd (2D): 4.5 cm  LVIDs (2D): 2.3 cm  LVPWd (2D): 0.9 cm  RVIDd (2D): 4.1 cm    Unspecified Scan Mode  Peak Grad; Mean; Antegrade Flow: 11 mm[Hg]  Vmax;  Antegrade Flow: 169 cm/s    Prepared and signed by     [address] [place] [address], DO  Signed 03-May-2021 16:15:09         Cultures: All Micro Results     Procedure Component Value Units Date/Time    CULTURE, URINE [[id number]] Collected: 05/03/21 0948    Order Status: Completed Specimen: Cath Urine Updated: 05/06/21 0720     Special Requests: NO SPECIAL REQUESTS        Culture result: NO GROWTH 2 DAYS       CULTURE, BLOOD [[id number]] Collected: 05/03/21 1352    Order Status: Completed Specimen: Blood Updated: 05/06/21 0656     Special Requests: --        RIGHT  Antecubital       Culture result: NO GROWTH 3 DAYS       SARS-COV-2, PCR [[id number]] Collected: 05/04/21 1650    Order Status: Completed Specimen: Nasopharyngeal Updated: 05/05/21 1042     Specimen source Nasopharyngeal        SARS-CoV-2 Not detected        Comment:      The specimen is NEGATIVE for SARS-CoV-2, the novel coronavirus associated with COVID-19. This test has been authorized by the FDA under an Emergency Use Authorization (EUA) for use by authorized laboratories. Fact sheet for Healthcare Providers: [employer]       Fact sheet for Patients: [employer]       Methodology: RT-PCR         COVID-19 RAPID TEST [[id number]] Collected: 05/04/21 1650    Order Status: Completed Specimen: Nasopharyngeal Updated: 05/04/21 1828     Specimen source Nasopharyngeal        COVID-19 rapid test Not detected        Comment:      The specimen is NEGATIVE for SARS-CoV-2, the novel coronavirus associated with COVID-19. A negative result does not rule out COVID-19. This test has been authorized by the FDA under an Emergency Use Authorization (EUA) for use by authorized laboratories.         Fact sheet for Healthcare Providers: [employer]  Fact sheet for Patients: [employer]       Methodology: Isothermal Nucleic Acid Amplification Assessment:     Primary Diagnosis: Acute respiratory failure with hypoxia [facility]    Hospital Problems as of 5/6/2021 Date Reviewed: 12/21/2020          Codes Class Noted - Resolved POA    * (Principal) Acute respiratory failure with hypoxia ([facility] ICD-10-CM: J96.01  ICD-9-CM: 518.81  5/3/2021 - Present Yes        Pneumonia due to Escherichia coli [facility]) ICD-10-CM: J15.5  ICD-9-CM: 482.82  5/3/2021 - Present Yes        Acute on chronic diastolic congestive heart failure (HCC) ICD-10-CM: I50.33  ICD-9-CM: 428.33, 428.0  10/27/2016 - Present Yes        Acute metabolic encephalopathy [facility]-KH: G93.41  ICD-9-CM: 348.31  5/3/2021 - Present Yes        Stage 3 chronic kidney disease ([facility] ICD-10-CM: N18.30  ICD-9-CM: 585.3  6/18/2020 - Present Yes        Paroxysmal atrial fibrillation ([facility] ICD-10-CM: I48.0  ICD-9-CM: 427.31  6/12/2019 - Present Yes        Diabetes mellitus type II, non insulin dependent (HCC) (Chronic) ICD-10-CM: E11.9  ICD-9-CM: 250.00  7/23/2014 - Present Yes        PVD (peripheral vascular disease) ([facility] ICD-10-CM: I73.9  ICD-9-CM: 443.9  10/27/2016 - Present Yes        Class 1 obesity due to excess calories with serious comorbidity and body mass index (BMI) of 32.0 to 32.9 in adult ICD-10-CM: E66.09, Z68.32  ICD-9-CM: 278.00, V85.32  8/12/2019 - Present Yes        Hyperlipidemia (Chronic) ICD-10-CM: E78.5  ICD-9-CM: 272.4  7/23/2014 - Present Yes        RESOLVED: Hallucinations ICD-10-CM: R44.3  ICD-9-CM: 780.1  5/3/2021 - 5/4/2021 Yes                Discharge Plan:     Medically stable for discharge to [facility]    Signed By: Akhil Mcghee DO     May 6, 2021

## 2021-05-06 NOTE — PROGRESS NOTES
ACUTE PHYSICAL THERAPY GOALS:  (Developed with and agreed upon by patient and/or caregiver.)  (1.) Michael Vieyra will move from supine to sit and sit to supine , scoot up and down and roll side to side with STAND BY ASSIST within 7 treatment day(s). (2.) Michael Vieyra will transfer from bed to chair and chair to bed with STAND BY ASSIST using the least restrictive device within 7 treatment day(s). (3.) Michael Vieyra will ambulate with CONTACT GUARD ASSIST for 300 feet with the least restrictive device within 7 treatment day(s). (4.) Michael Vieyra will perform standing static and dynamic balance activities x 25 minutes with CONTACT GUARD ASSIST to improve safety within 7 treatment day(s). (5.) Michael Vieyra will perform therapeutic exercises x 15 min for HEP with INDEPENDENCE to improve strength, endurance, and functional mobility within 7 treatment day(s). PHYSICAL THERAPY: Daily Note and AM Treatment Day # 2    Michael Vieyra is a 79 y.o. male   PRIMARY DIAGNOSIS: Acute respiratory failure with hypoxia ([facility] [address]  Acute metabolic encephalopathy [W01.33]  Respiratory failure with hypoxia (HCC) [J96.91]  CHF (congestive heart failure) ([address] [I50.9]  Pneumonia [J18.9]  Hallucinations [R44.3]         ASSESSMENT:     REHAB RECOMMENDATIONS: CURRENT LEVEL OF FUNCTION:  (Most Recently Demonstrated)   Recommendation to date pending progress:  Setting:   Short-term Rehab  Equipment:    None Bed Mobility:   Minimal Assistance  Sit to Stand:   Minimal Assistance  Transfers:   Minimal Assistance  Gait/Mobility:   Contact Guard Assistance     ASSESSMENT:  Mr. Kristofer Rivera is an 79year old M who presents to hospital with acute metabolic encephalopathy, CHF. Today he continues to make good progress with therapy. Overall Min A for bed mobility and transfers, CGA for gait 2 x 40 ft with RW.  Good progress with mobility, increased gait distance and improved steadiness on his feet, though gait pattern is still shuffled and delayed. Pt presents as functioning below his baseline, with deficits in mobility including transfers, gait, balance, and activity tolerance. Pt will benefit from skilled therapy services to address stated deficits to promote return to highest level of function, independence, and safety. Will continue to follow. SUBJECTIVE:   Mr. Jeremy Stauffer states, \"I am doing great today, darling. \"    SOCIAL HISTORY/ LIVING ENVIRONMENT:  Lives alone, his cousin checks in on him and assists as needed. Has cane and RW for mobility.   Home Environment: Apartment  # Steps to Enter: 0  One/Two Story Residence: One story  Living Alone: Yes  Support Systems: Family member(s)  OBJECTIVE:     PAIN: VITAL SIGNS: LINES/DRAINS:   Pre Treatment: Pain Screen  Pain Scale 1: Numeric (0 - 10)  Pain Intensity 1: 0  Post Treatment: 0/10 Vital Signs  O2 Device: None (Room air) Purewick  O2 Device: None (Room air)     MOBILITY: I Mod I S SBA CGA Min Mod Max Total  NT x2 Comments:   Bed Mobility    Rolling [] [] [] [] [] [] [] [] [] [x] []    Supine to Sit [] [] [] [] [] [] [] [] [] [x] []    Scooting [] [] [] [] [] [x] [] [] [] [] []    Sit to Supine [] [] [] [] [] [] [] [] [] [x] []    Transfers    Sit to Stand [] [] [] [] [] [x] [] [] [] [] []    Bed to Chair [] [] [] [] [] [x] [] [] [] [] []    Stand to Sit [] [] [] [] [x] [] [] [] [] [] []    I=Independent, Mod I=Modified Independent, S=Supervision, SBA=Standby Assistance, CGA=Contact Guard Assistance,   Min=Minimal Assistance, Mod=Moderate Assistance, Max=Maximal Assistance, Total=Total Assistance, NT=Not Tested    BALANCE: Good Fair+ Fair Fair- Poor NT Comments   Sitting Static [] [x] [] [] [] []    Sitting Dynamic [] [x] [] [] [] []              Standing Static [] [] [x] [] [] []    Standing Dynamic [] [] [x] [] [] []      GAIT: I Mod I S SBA CGA Min Mod Max Total  NT x2 Comments:   Level of Assistance [] [] [] [] [x] [] [] [] [] [] [] Distance 2 x 40 ft    DME Rolling Walker and Gait Belt    Gait Quality Slow, shuffled steps    Weightbearing  Status N/A     I=Independent, Mod I=Modified Independent, S=Supervision, SBA=Standby Assistance, CGA=Contact Guard Assistance,   Min=Minimal Assistance, Mod=Moderate Assistance, Max=Maximal Assistance, Total=Total Assistance, NT=Not Tested    PLAN:   FREQUENCY/DURATION: PT Plan of Care: 3 times/week for duration of hospital stay or until stated goals are met, whichever comes first.  TREATMENT:     TREATMENT:   ($$ Therapeutic Activity: 23-37 mins    )  Therapeutic Activity (25 Minutes): Therapeutic activity included Scooting, Transfer Training, Ambulation on level ground, Sitting balance  and Standing balance to improve functional Mobility, Strength, Activity tolerance and balance.     TREATMENT GRID:  N/A    AFTER TREATMENT POSITION/PRECAUTIONS:  Alarm Activated, Chair, Needs within reach and RN notified    INTERDISCIPLINARY COLLABORATION:  RN/PCT and PT/PTA    TOTAL TREATMENT DURATION:  PT Patient Time In/Time Out  Time In: 1047  Time Out: [address], PT

## 2021-05-07 LAB
CREAT SERPL-MCNC: 1.12 MG/DL (ref 0.8–1.5)
GLUCOSE BLD STRIP.AUTO-MCNC: 78 MG/DL (ref 65–100)
GLUCOSE BLD STRIP.AUTO-MCNC: 82 MG/DL (ref 65–100)
GLUCOSE BLD STRIP.AUTO-MCNC: 86 MG/DL (ref 65–100)
GLUCOSE BLD STRIP.AUTO-MCNC: 96 MG/DL (ref 65–100)
SERVICE CMNT-IMP: NORMAL

## 2021-05-07 PROCEDURE — 3331090002 HH PPS REVENUE DEBIT

## 2021-05-07 PROCEDURE — 2709999900 HC NON-CHARGEABLE SUPPLY

## 2021-05-07 PROCEDURE — 74011250637 HC RX REV CODE- 250/637: Performed by: INTERNAL MEDICINE

## 2021-05-07 PROCEDURE — 29581 APPL MULTLAYER CMPRN SYS LEG: CPT

## 2021-05-07 PROCEDURE — 36415 COLL VENOUS BLD VENIPUNCTURE: CPT

## 2021-05-07 PROCEDURE — 65270000029 HC RM PRIVATE

## 2021-05-07 PROCEDURE — 82565 ASSAY OF CREATININE: CPT

## 2021-05-07 PROCEDURE — 3331090001 HH PPS REVENUE CREDIT

## 2021-05-07 PROCEDURE — 77030038269 HC DRN EXT URIN PURWCK BARD -A

## 2021-05-07 PROCEDURE — 82962 GLUCOSE BLOOD TEST: CPT

## 2021-05-07 RX ADMIN — APIXABAN 5 MG: 5 TABLET, FILM COATED ORAL at 21:07

## 2021-05-07 RX ADMIN — METOPROLOL TARTRATE 25 MG: 25 TABLET, FILM COATED ORAL at 21:07

## 2021-05-07 RX ADMIN — FINASTERIDE 5 MG: 5 TABLET, FILM COATED ORAL at 08:43

## 2021-05-07 RX ADMIN — DOXYCYCLINE HYCLATE 100 MG: 100 CAPSULE ORAL at 08:43

## 2021-05-07 RX ADMIN — ASPIRIN 81 MG: 81 TABLET ORAL at 08:43

## 2021-05-07 RX ADMIN — DOXYCYCLINE HYCLATE 100 MG: 100 CAPSULE ORAL at 21:07

## 2021-05-07 RX ADMIN — ALLOPURINOL 300 MG: 300 TABLET ORAL at 08:43

## 2021-05-07 RX ADMIN — APIXABAN 5 MG: 5 TABLET, FILM COATED ORAL at 08:43

## 2021-05-07 RX ADMIN — ATORVASTATIN CALCIUM 80 MG: 80 TABLET, FILM COATED ORAL at 21:07

## 2021-05-07 NOTE — PROGRESS NOTES
CHF teaching materials for [facility] @ BS, scale @ [address]. . Will email [facility] @ DC with same.  : 1 hr total    [employer] App

## 2021-05-07 NOTE — PROGRESS NOTES
[facility] Hospitalist Progress Note    Patient: Sandip Garrison MRN: [id number]  SSN: [id number]    YOB: 1940  Age: 79 y.o. Sex: male      Admit Date: 5/3/2021    LOS: 4 days     Subjective:     Chief Complaint: Confusion and SOB  Reason for Admission: Acute respiratory failure with hypoxia ([facility] [address]    79 y.o. male with medical h/o chronic dCHF, COPD and diabetes who presented to [facility] ED with a complaint of abnormal behaviors, SOB and a general decline over the last several weeks. He was found to have acute respiratory failure due to acute dCHF and pneumonia. He was started on Lasix and Doxycyline. 5/7 - He feels good today. Left leg ulcer pain improved. Says he's ready to get out of here. Denies CP/SOB. Denies F/C. Denies V/D. Review of systems negative except stated above. Assessment/Plan (MDM):      Active Medical Complaints and Diagnoses:    Principal Problem:    Acute respiratory failure with hypoxia ([facility] [address] (5/3/2021)  - Satting 85% on room air in ER  - Resolved, now on room air  - Due to acute dCHF + pneumonia  - Continue Doxycycline - 5/10/21  - Lasix PRN - per Cardiology  - Wean oxygen as appropriate    Active Problems:    Acute on chronic diastolic congestive heart failure ([facility] [address] (10/27/2016)  - BNP 2,274 + CXR evidence  - 7/1L UOP since admit  - Lasix PRN - per Cardiology  - Continue Lopressor  - Continue Lisinopril      [facility] (5/3/2021)  - Seen on CXR  - Continue Doxycycline - EOT 5/10/21  - No sputum production currently      Acute metabolic encephalopathy (8/9/8102)  - Appears resolved  - 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, nothing acute  - Due to acute illness  - Continue current treatment      Diabetes mellitus type II, non insulin dependent ([facility] [address] (7/23/2014)  - Stable  - Continue Humalog SSI      Paroxysmal atrial fibrillation ([facility] [address] (6/12/2019)  - Stable  - Continue Lopressor  - Continue Eliquis      Stage 3 chronic kidney disease ([facility] (6/18/2020)  - Stable      PVD (peripheral vascular disease) ([facility] [address] (10/27/2016)      Hyperlipidemia (7/23/2014)      Class 1 obesity due to excess calories with serious comorbidity and body mass index (BMI) of 32.0 to 32.9 in adult (8/12/2019)      Otherwise all chronic medical issues appear stable with no changes. See assessment at bottom of progress note for complete acute, chronic, and resolved hospital medical issues. Diet: DIET CARDIAC Regular; Consistent Carb 1800kcal  VTE ppx: Eliquis    Objective:     Visit Vitals  BP (!) 98/59 (BP 1 Location: Left arm, BP Patient Position: Sitting)   Pulse 89   Temp 98.6 °F (37 °C)   Resp 16   Ht 6' (1.829 m)   Wt 89.9 kg (198 lb 3.2 oz)   SpO2 96%   BMI 26.88 kg/m²      Oxygen Therapy  O2 Sat (%): 96 % (05/07/21 1129)  Pulse via Oximetry: 71 beats per minute (05/05/21 1006)  O2 Device: None (Room air) (05/06/21 1047)  Skin Assessment: Clean, dry, & intact (05/04/21 1048)  Skin Protection for O2 Device: No (05/04/21 1048)  O2 Flow Rate (L/min): 3 l/min (05/05/21 1006)  FIO2 (%): 32 % (05/05/21 1006)      Intake and Output:     Intake/Output Summary (Last 24 hours) at 5/7/2021 1229  Last data filed at 5/7/2021 0441  Gross per 24 hour   Intake    Output 650 ml   Net -650 ml         Physical Exam:   GENERAL: alert, cooperative, no distress, appears stated age  EYE: conjunctivae/corneas clear. PERRL. THROAT & NECK: normal and no erythema or exudates noted. LUNG: clear to auscultation bilaterally  HEART: irregular rate and rhythm, S1S2, no murmur, no JVD  ABDOMEN: soft, non-tender, non-distended. Bowel sounds normal.   EXTREMITIES:  Trace edema, Legs wrapped in ACE bandage  SKIN: no rash or abnormalities  NEUROLOGIC: A&Ox2-3. Cranial nerves 2-12 grossly intact.     Lab/Data Review:  Recent Results (from the past 24 hour(s))   GLUCOSE, POC    Collection Time: 05/06/21  4:34 PM   Result Value Ref Range    Glucose (POC) 86 65 - 100 mg/dL    Performed by Janice EMMANUEL DOCUMENT PPD TEST IN 72 HRS    Collection Time: 05/06/21  5:26 PM   Result Value Ref Range    PPD Negative Negative    mm Induration 0 0 - 5 mm   GLUCOSE, POC    Collection Time: 05/06/21  8:11 PM   Result Value Ref Range    Glucose (POC) 76 65 - 100 mg/dL    Performed by Caren Pope    CREATININE    Collection Time: 05/07/21  4:54 AM   Result Value Ref Range    Creatinine 1.12 0.8 - 1.5 MG/DL   GLUCOSE, POC    Collection Time: 05/07/21  7:16 AM   Result Value Ref Range    Glucose (POC) 86 65 - 100 mg/dL    Performed by Michelle Storm 1729, POC    Collection Time: 05/07/21 11:24 AM   Result Value Ref Range    Glucose (POC) 82 65 - 100 mg/dL    Performed by Jayesh King        SARS-CoV-2 Lab Results  \"Novel Coronavirus\" Test: No results found for: COV2NT   \"Emergent Disease\" Test: No results found for: EDPR  \"SARS-COV-2\" Test: No results found for: XGCOVT  \"Precision Labs\" Test: No results found for: RSLT  Rapid Test:   Lab Results   Component Value Date/Time    COVR Not detected 05/04/2021 04:50 PM           Imaging:  Xr Chest Pa Lat    Result Date: 4/28/2021  Chest 2 view CLINICAL INDICATION: Acute severe Weakness, difficulty walking, fell yesterday, altered mental status COMPARISON: 6/11/2020, 8/12/2019 TECHNIQUE: Sitting upright frontal and lateral views of the chest FINDINGS: Lung volumes are well inflated. Cardiomediastinal silhouette and hilar contours are stable. Minimal linear atelectasis or scarring is scattered in both bases. The lungs demonstrating no consolidation, pneumothorax, pleural effusion or CHF. No acute osseous abnormalities are seen. No acute airspace disease.      Ct Head Wo Cont    Result Date: 5/3/2021  EXAMINATION: HEAD CT WITHOUT CONTRAST 5/3/2021 9:34 AM ACCESSION NUMBER: [id number] INDICATION: ALTERED mental status COMPARISON: Brain MRI 12/5/2016, head CT 12/5/2016 TECHNIQUE: Multiple-row detector helical CT examination of the head without intravenous contrast. Radiation dose reduction techniques were used for this study:  Our CT scanners use one or all of the following: Automated exposure control, adjustment of the mA and/or kVp according to patient's size, iterative reconstruction. FINDINGS: The ventricles are stable in caliber in comparison to the prior exam and are appropriate for the mild degree of symmetric global brain parenchymal volume loss. There is no midline shift. The basilar cisterns are patent. There is no cerebellar tonsillar ectopia or herniation. Hypodensities in the periventricular and subcortical white matter are nonspecific, but they are most likely to represent chronic microangiopathy. Prior bilateral lens replacement. Subtle left precentral gyrus encephalomalacia at the site of the infarction demonstrated on 12/5/2016 exam.  There is no evidence of acute intracranial hemorrhage or extra-axial collections. There is no CT evidence of acute infarction. The paranasal sinuses and mastoid air cells are well aerated and clear. 1. No acute intracranial abnormality. 2. Chronic findings as above. VOICE DICTATED BY: Dr. Victorino Santos     Xr Chest Port    Result Date: 5/3/2021  CHEST X-RAY, single portable view  5/3/2021 History: Altered mental status/confusion. Technique: Single frontal view of the chest. Comparison: Chest x-ray 4/28/2020 Findings: The cardiac silhouette is moderately enlarged although stable. The lungs are expanded without evidence for pneumothorax. New mild left basilar airspace changes and small left basilar effusion are seen. 1.  Stable cardiomegaly with new mild left basilar airspace changes and small left basilar effusion. Given the patient's acute symptoms, this is felt to most likely to represent pneumonia versus an atypical presentation of heart failure. Recommend clinical correlation. This report was made using voice transcription.  Despite my best efforts to avoid any, transcription errors may persist. If there is any question about the accuracy of the report or need for clarification, then please call [phone], or text me through perfectserv for clarification or correction. Xr Knee Lt 3 V    Result Date: 2021  EXAM:  XR KNEE LT 3 V INDICATION:   Left knee pain after fall COMPARISON: None. FINDINGS: Three views of the left knee demonstrate end-stage tricompartmental secondary osteoarthritis. Small joint effusion. No evidence of fractures or dislocations. .      End-stage tricompartmental secondary osteoarthritis with small joint effusion. Results for orders placed or performed during the hospital encounter of 21   2D ECHO COMPLETE ADULT (TTE) W OR [address] [facility] [address] [facility], [address] [facility]  [phone]    Transthoracic Echocardiogram  2D, M-mode, Doppler, and Color Doppler    Patient: Jennifer Olivares  MR #: [id number]  : 1940  Age: 79 years  Gender: Male  Study date: 03-May-2021  Account #: [de-identified]  Height: 72 in  Weight: 229.5 lb  BSA: 2.26 mï¾²  Status:Routine  Location: 609  BP: 138/ 70    Allergies: NO KNOWN ALLERGENS    Sonographer:  Unknown Necessary  Group:  [facility]  Referring Physician:  Zaida Zhong. Libby Villegas MD  Reading Physician:  DR. NAE PIMENTEL DO    INDICATIONS: Heart failure. Systolic, Chronic. PROCEDURE: This was a routine study. A transthoracic echocardiogram was  performed. The study included complete 2D imaging, M-mode, complete spectral  Doppler, and color Doppler. Intravenous contrast (Definity) was administered. This was a technically difficult study. LEFT VENTRICLE: Size was normal. Systolic function was at the lower limits of  normal. Ejection fraction was estimated in the range of 50 % to 55 %. There  were no regional wall motion abnormalities. Septal flattening consistent with  RV pressure overload.  Wall thickness was normal. The study was not   technically  sufficient to allow evaluation of LV diastolic function. RIGHT VENTRICLE: The ventricle was dilated. Systolic function was normal.  Estimated peak pressure was in the range of 90-95 mmHg. LEFT ATRIUM: Size was normal.    RIGHT ATRIUM: The atrium was mildly dilated. SYSTEMIC VEINS: IVC: The inferior vena cava was dilated. The respirophasic  change in diameter was less than 50%. AORTIC VALVE: The valve was trileaflet. Leaflets exhibited mildly increased  thickness. There was no evidence for stenosis. There was mild regurgitation. Pressure half time 861 msec. MITRAL VALVE: Valve structure was normal. There was no evidence for stenosis. There was mild regurgitation. TRICUSPID VALVE: The valve structure was normal. There was no evidence for  stenosis. There was moderate to severe regurgitation. PULMONIC VALVE: The valve structure was normal. There was no evidence for  stenosis. There was mild regurgitation. PERICARDIUM: There was no pericardial effusion. AORTA: The root exhibited normal size. SUMMARY:    -  Left ventricle: Systolic function was at the lower limits of normal.  Ejection fraction was estimated in the range of 50 % to 55 %. There were no  regional wall motion abnormalities. Septal flattening consistent with RV  pressure overload. -  Right ventricle: The ventricle was dilated. Estimated peak pressure was in  the range of 90-95 mmHg. -  Right atrium: The atrium was mildly dilated. -  Inferior vena cava, hepatic veins: The inferior vena cava was dilated. The  respirophasic change in diameter was less than 50%. -  Mitral valve: There was mild regurgitation.    -  Tricuspid valve: There was moderate to severe regurgitation.     SYSTEM MEASUREMENT TABLES    2D mode  AoR Diam (2D): 2.7 cm  LA Dimension (2D): 3.7 cm  Left Atrium Systolic Volume Index; Method of Disks, Biplane; 2D mode;: 21.6  ml/m2  IVS/LVPW (2D): 1  IVSd (2D): 0.9 cm  LVIDd (2D): 4.5 cm  LVIDs (2D): 2.3 cm  LVPWd (2D): 0.9 cm  RVIDd (2D): 4.1 cm    Unspecified Scan Mode  Peak Grad; Mean; Antegrade Flow: 11 mm[Hg]  Vmax; Antegrade Flow: 169 cm/s    Prepared and signed by     [address] [place] [address],   Signed 03-May-2021 16:15:09         Cultures: All Micro Results     Procedure Component Value Units Date/Time    CULTURE, BLOOD [[id number]] Collected: 05/03/21 1352    Order Status: Completed Specimen: Blood Updated: 05/07/21 0651     Special Requests: --        RIGHT  Antecubital       Culture result: NO GROWTH 4 DAYS       CULTURE, URINE [[id number]] Collected: 05/03/21 0948    Order Status: Completed Specimen: Cath Urine Updated: 05/06/21 0720     Special Requests: NO SPECIAL REQUESTS        Culture result: NO GROWTH 2 DAYS       SARS-COV-2, PCR [[id number]] Collected: 05/04/21 1650    Order Status: Completed Specimen: Nasopharyngeal Updated: 05/05/21 1042     Specimen source Nasopharyngeal        SARS-CoV-2 Not detected        Comment:      The specimen is NEGATIVE for SARS-CoV-2, the novel coronavirus associated with COVID-19. This test has been authorized by the FDA under an Emergency Use Authorization (EUA) for use by authorized laboratories. Fact sheet for Healthcare Providers: [email or web address]       Fact sheet for Patients: [email or web address]       Methodology: RT-PCR         COVID-19 RAPID TEST [[id number]] Collected: 05/04/21 1650    Order Status: Completed Specimen: Nasopharyngeal Updated: 05/04/21 1828     Specimen source Nasopharyngeal        COVID-19 rapid test Not detected        Comment:      The specimen is NEGATIVE for SARS-CoV-2, the novel coronavirus associated with COVID-19. A negative result does not rule out COVID-19. This test has been authorized by the FDA under an Emergency Use Authorization (EUA) for use by authorized laboratories.         Fact sheet for Healthcare Providers: [email or web address]  Fact sheet for Patients: [email or web address]       Methodology: Isothermal Nucleic Acid Amplification               Assessment:     Primary Diagnosis: Acute respiratory failure with hypoxia [facility]    Hospital Problems as of 5/7/2021 Date Reviewed: 12/21/2020          Codes Class Noted - Resolved POA    * (Principal) Acute respiratory failure with hypoxia ([facility] ICD-10-CM: J96.01  ICD-9-CM: 518.81  5/3/2021 - Present Yes        Pneumonia due to Escherichia coli [facility]) ICD-10-CM: J15.5  ICD-9-CM: 482.82  5/3/2021 - Present Yes        Acute on chronic diastolic congestive heart failure (HCC) ICD-10-CM: I50.33  ICD-9-CM: 428.33, 428.0  10/27/2016 - Present Yes        Acute metabolic encephalopathy UXA-70-FX: G93.41  ICD-9-CM: 348.31  5/3/2021 - Present Yes        Stage 3 chronic kidney disease ([facility] ICD-10-CM: N18.30  ICD-9-CM: 585.3  6/18/2020 - Present Yes        Paroxysmal atrial fibrillation ([facility] ICD-10-CM: I48.0  ICD-9-CM: 427.31  6/12/2019 - Present Yes        Diabetes mellitus type II, non insulin dependent (HCC) (Chronic) ICD-10-CM: E11.9  ICD-9-CM: 250.00  7/23/2014 - Present Yes        PVD (peripheral vascular disease) ([facility] ICD-10-CM: I73.9  ICD-9-CM: 443.9  10/27/2016 - Present Yes        Class 1 obesity due to excess calories with serious comorbidity and body mass index (BMI) of 32.0 to 32.9 in adult ICD-10-CM: E66.09, Z68.32  ICD-9-CM: 278.00, V85.32  8/12/2019 - Present Yes        Hyperlipidemia (Chronic) ICD-10-CM: E78.5  ICD-9-CM: 272.4  7/23/2014 - Present Yes        RESOLVED: Hallucinations ICD-10-CM: R44.3  ICD-9-CM: 780.1  5/3/2021 - 5/4/2021 Yes                Discharge Plan:     Medically stable for discharge to [facility]    Signed By: Osmar Mayorga DO     May 7, 2021

## 2021-05-07 NOTE — WOUND CARE
Bilateral lower leg edema improved, wound improved, dressings changed xeroform abd and kerlex and coban for compression. Will plan to change again Monday. Will need home health versus SNF for continued wound care. Known to wound clinic should follow up with wound clinic as well.

## 2021-05-07 NOTE — PROGRESS NOTES
Patient up with assitance x1. Ambulating to bathroom with walker. BLE dressings c/d/i. No complaints of pain this shift. Patient ate % of all meals today. Remote Tele DC today per order from 5/6/21. Hourly rounds performed this shift. Bed lowered and locked, side rails x2, and call light in reach. All patient needs are met at this time. Bedside shift report will be given to oncoming nurse.

## 2021-05-07 NOTE — PROGRESS NOTES
patient is calm   sitting in the chair   alert   very friendly   had a good visit with him thanking him for allowing us to take care of him

## 2021-05-08 LAB
BACTERIA SPEC CULT: NORMAL
GLUCOSE BLD STRIP.AUTO-MCNC: 108 MG/DL (ref 65–100)
GLUCOSE BLD STRIP.AUTO-MCNC: 85 MG/DL (ref 65–100)
GLUCOSE BLD STRIP.AUTO-MCNC: 91 MG/DL (ref 65–100)
GLUCOSE BLD STRIP.AUTO-MCNC: 96 MG/DL (ref 65–100)
SERVICE CMNT-IMP: ABNORMAL
SERVICE CMNT-IMP: NORMAL

## 2021-05-08 PROCEDURE — 82962 GLUCOSE BLOOD TEST: CPT

## 2021-05-08 PROCEDURE — 74011250637 HC RX REV CODE- 250/637: Performed by: INTERNAL MEDICINE

## 2021-05-08 PROCEDURE — 65270000029 HC RM PRIVATE

## 2021-05-08 PROCEDURE — 3331090001 HH PPS REVENUE CREDIT

## 2021-05-08 PROCEDURE — 3331090002 HH PPS REVENUE DEBIT

## 2021-05-08 PROCEDURE — 94760 N-INVAS EAR/PLS OXIMETRY 1: CPT

## 2021-05-08 RX ADMIN — METOPROLOL TARTRATE 25 MG: 25 TABLET, FILM COATED ORAL at 09:00

## 2021-05-08 RX ADMIN — ATORVASTATIN CALCIUM 80 MG: 80 TABLET, FILM COATED ORAL at 21:41

## 2021-05-08 RX ADMIN — ASPIRIN 81 MG: 81 TABLET ORAL at 09:07

## 2021-05-08 RX ADMIN — APIXABAN 5 MG: 5 TABLET, FILM COATED ORAL at 21:41

## 2021-05-08 RX ADMIN — APIXABAN 5 MG: 5 TABLET, FILM COATED ORAL at 09:08

## 2021-05-08 RX ADMIN — METOPROLOL TARTRATE 25 MG: 25 TABLET, FILM COATED ORAL at 21:41

## 2021-05-08 RX ADMIN — FINASTERIDE 5 MG: 5 TABLET, FILM COATED ORAL at 09:08

## 2021-05-08 RX ADMIN — ALLOPURINOL 300 MG: 300 TABLET ORAL at 09:08

## 2021-05-08 NOTE — PROGRESS NOTES
Hourly rounds completed. Pt remains on 2L NC, with SOB on exertion noted. Denies pain. Currently resting at this time. Call light within reach, will give report to oncoming RN. 03-Oct-2018

## 2021-05-08 NOTE — PROGRESS NOTES
[facility] Hospitalist Progress Note    Patient: Lili Palacios MRN: [id number]  SSN: [id number]    YOB: 1940  Age: 79 y.o. Sex: male      Admit Date: 5/3/2021    LOS: 5 days     Subjective:     Chief Complaint: Confusion and SOB  Reason for Admission: Acute respiratory failure with hypoxia ([facility]    79 y.o. male with medical h/o chronic dCHF, COPD and diabetes who presented to [facility] ED with a complaint of abnormal behaviors, SOB and a general decline over the last several weeks. He was found to have acute respiratory failure due to acute dCHF and pneumonia. He was started on Lasix and Doxycyline. 5/8 - He feels better today. Left leg ulcer pain improved. Denies CP/SOB. Denies F/C. Denies V/D. Review of systems negative except stated above. Assessment/Plan (MDM):      Active Medical Complaints and Diagnoses:    Principal Problem:    Acute respiratory failure with hypoxia ([facility] [address] (5/3/2021)  - Satting 85% on room air in ER  - Resolved, now on room air  - Due to acute dCHF + pneumonia  - Continue Doxycycline - 5/10/21  - Lasix PRN - per Cardiology  - Wean oxygen as appropriate    Active Problems:    Acute on chronic diastolic congestive heart failure ([facility] [address] (10/27/2016)  - BNP 2,274 + CXR evidence  - 7/1L UOP since admit  - Lasix PRN - per Cardiology  - Continue Lopressor  - Continue Lisinopril      Pneumonia due to Escherichia coli ([facility] (5/3/2021)  - Seen on CXR  - Continue Doxycycline - EOT 5/10/21  - No sputum production currently      Acute metabolic encephalopathy (4/0/3281)  - Appears resolved  - 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, nothing acute  - Due to acute illness  - Continue current treatment      Diabetes mellitus type II, non insulin dependent ([facility] (7/23/2014)  - Stable  - Continue Humalog SSI      Paroxysmal atrial fibrillation ([address] (6/12/2019)  - Stable  - Continue Lopressor  - Continue Eliquis      Stage 3 chronic kidney disease ([facility] [address] (6/18/2020)  - Stable      PVD (peripheral vascular disease) ([facility] (10/27/2016)      Hyperlipidemia (7/23/2014)      Class 1 obesity due to excess calories with serious comorbidity and body mass index (BMI) of 32.0 to 32.9 in adult (8/12/2019)      Otherwise all chronic medical issues appear stable with no changes. See assessment at bottom of progress note for complete acute, chronic, and resolved hospital medical issues. Diet: DIET CARDIAC Regular; Consistent Carb 1800kcal  VTE ppx: Eliquis    Objective:     Visit Vitals  /62 (BP 1 Location: Right upper arm, BP Patient Position: Sitting)   Pulse 80   Temp 98.4 °F (36.9 °C)   Resp 17   Ht 6' (1.829 m)   Wt 87.2 kg (192 lb 4.8 oz)   SpO2 99%   BMI 26.08 kg/m²      Oxygen Therapy  O2 Sat (%): 99 % (05/08/21 1020)  Pulse via Oximetry: 71 beats per minute (05/05/21 1006)  O2 Device: None (Room air) (05/08/21 0737)  Skin Assessment: Clean, dry, & intact (05/04/21 1048)  Skin Protection for O2 Device: No (05/04/21 1048)  O2 Flow Rate (L/min): 3 l/min (05/05/21 1006)  FIO2 (%): 32 % (05/05/21 1006)      Intake and Output:     Intake/Output Summary (Last 24 hours) at 5/8/2021 1048  Last data filed at 5/8/2021 0407  Gross per 24 hour   Intake 480 ml   Output 900 ml   Net -420 ml         Physical Exam:   GENERAL: alert, cooperative, no distress, appears stated age  EYE: conjunctivae/corneas clear. PERRL. THROAT & NECK: normal and no erythema or exudates noted. LUNG: clear to auscultation bilaterally  HEART: irregular rate and rhythm, S1S2, no murmur, no JVD  ABDOMEN: soft, non-tender, non-distended. Bowel sounds normal.   EXTREMITIES:  Trace edema, Legs wrapped in ACE bandage  SKIN: no rash or abnormalities  NEUROLOGIC: A&Ox2-3. Cranial nerves 2-12 grossly intact.     Lab/Data Review:  Recent Results (from the past 24 hour(s))   GLUCOSE, POC    Collection Time: 05/07/21 11:24 AM   Result Value Ref Range    Glucose (POC) 82 65 - 100 mg/dL    Performed by Michelle Storm 1729, POC    Collection Time: 05/07/21  4:23 PM   Result Value Ref Range    Glucose (POC) 78 65 - 100 mg/dL    Performed by Michelle Storm 1729, POC    Collection Time: 05/07/21  8:23 PM   Result Value Ref Range    Glucose (POC) 96 65 - 100 mg/dL    Performed by Carol Vasquez, POC    Collection Time: 05/08/21  8:01 AM   Result Value Ref Range    Glucose (POC) 96 65 - 100 mg/dL    Performed by Louis    GLUCOSE, POC    Collection Time: 05/08/21 10:22 AM   Result Value Ref Range    Glucose (POC) 108 (H) 65 - 100 mg/dL    Performed by [facility]        SARS-CoV-2 Lab Results  \"Novel Coronavirus\" Test: No results found for: COV2NT   \"Emergent Disease\" Test: No results found for: EDPR  \"SARS-COV-2\" Test: No results found for: XGCOVT  \"Precision Labs\" Test: No results found for: RSLT  Rapid Test:   Lab Results   Component Value Date/Time    COVR Not detected 05/04/2021 04:50 PM           Imaging:  Xr Chest Pa Lat    Result Date: 4/28/2021  Chest 2 view CLINICAL INDICATION: Acute severe Weakness, difficulty walking, fell yesterday, altered mental status COMPARISON: 6/11/2020, 8/12/2019 TECHNIQUE: Sitting upright frontal and lateral views of the chest FINDINGS: Lung volumes are well inflated. Cardiomediastinal silhouette and hilar contours are stable. Minimal linear atelectasis or scarring is scattered in both bases. The lungs demonstrating no consolidation, pneumothorax, pleural effusion or CHF. No acute osseous abnormalities are seen. No acute airspace disease.      Ct Head Wo Cont    Result Date: 5/3/2021  EXAMINATION: HEAD CT WITHOUT CONTRAST 5/3/2021 9:34 AM ACCESSION NUMBER: [id number] INDICATION: ALTERED mental status COMPARISON: Brain MRI 12/5/2016, head CT 12/5/2016 TECHNIQUE: Multiple-row detector helical CT examination of the head without intravenous contrast. Radiation dose reduction techniques were used for this study:  Our CT scanners use one or all of the following: Automated exposure control, adjustment of the mA and/or kVp according to patient's size, iterative reconstruction. FINDINGS: The ventricles are stable in caliber in comparison to the prior exam and are appropriate for the mild degree of symmetric global brain parenchymal volume loss. There is no midline shift. The basilar cisterns are patent. There is no cerebellar tonsillar ectopia or herniation. Hypodensities in the periventricular and subcortical white matter are nonspecific, but they are most likely to represent chronic microangiopathy. Prior bilateral lens replacement. Subtle left precentral gyrus encephalomalacia at the site of the infarction demonstrated on 12/5/2016 exam.  There is no evidence of acute intracranial hemorrhage or extra-axial collections. There is no CT evidence of acute infarction. The paranasal sinuses and mastoid air cells are well aerated and clear. 1. No acute intracranial abnormality. 2. Chronic findings as above. VOICE DICTATED BY: Dr. Shyla Alvarado     Xr Chest Port    Result Date: 5/3/2021  CHEST X-RAY, single portable view  5/3/2021 History: Altered mental status/confusion. Technique: Single frontal view of the chest. Comparison: Chest x-ray 4/28/2020 Findings: The cardiac silhouette is moderately enlarged although stable. The lungs are expanded without evidence for pneumothorax. New mild left basilar airspace changes and small left basilar effusion are seen. 1.  Stable cardiomegaly with new mild left basilar airspace changes and small left basilar effusion. Given the patient's acute symptoms, this is felt to most likely to represent pneumonia versus an atypical presentation of heart failure. Recommend clinical correlation. This report was made using voice transcription.  Despite my best efforts to avoid any, transcription errors may persist. If there is any question about the accuracy of the report or need for clarification, then please call [phone], or text me through [employer] for clarification or correction. Xr Knee Lt 3 V    Result Date: 2021  EXAM:  XR KNEE LT 3 V INDICATION:   Left knee pain after fall COMPARISON: None. FINDINGS: Three views of the left knee demonstrate end-stage tricompartmental secondary osteoarthritis. Small joint effusion. No evidence of fractures or dislocations. .      End-stage tricompartmental secondary osteoarthritis with small joint effusion. Results for orders placed or performed during the hospital encounter of 21   2D ECHO COMPLETE ADULT (TTE) W OR [address] [facility] [address] [facility], [facility] [address] [facility]  [phone]    Transthoracic Echocardiogram  2D, M-mode, Doppler, and Color Doppler    Patient: Catina De La Paz  MR #: [id number]  : 1940  Age: 79 years  Gender: Male  Study date: 03-May-2021  Account #: [de-identified]  Height: 72 in  Weight: 229.5 lb  BSA: 2.26 mï¾²  Status:Routine  Location: 60  BP: 138/ 70    Allergies: NO KNOWN ALLERGENS    Sonographer:  Wallace Nolasco  Group:  [facility]  Referring Physician:  Sara Silva. Molly Cherry MD  Reading Physician:  DR. NAE PIMENTEL DO    INDICATIONS: Heart failure. Systolic, Chronic. PROCEDURE: This was a routine study. A transthoracic echocardiogram was  performed. The study included complete 2D imaging, M-mode, complete spectral  Doppler, and color Doppler. Intravenous contrast (Definity) was administered. This was a technically difficult study. LEFT VENTRICLE: Size was normal. Systolic function was at the lower limits of  normal. Ejection fraction was estimated in the range of 50 % to 55 %. There  were no regional wall motion abnormalities. Septal flattening consistent with  RV pressure overload. Wall thickness was normal. The study was not   technically  sufficient to allow evaluation of LV diastolic function. RIGHT VENTRICLE: The ventricle was dilated.  Systolic function was normal.  Estimated peak pressure was in the range of 90-95 mmHg. LEFT ATRIUM: Size was normal.    RIGHT ATRIUM: The atrium was mildly dilated. SYSTEMIC VEINS: IVC: The inferior vena cava was dilated. The respirophasic  change in diameter was less than 50%. AORTIC VALVE: The valve was trileaflet. Leaflets exhibited mildly increased  thickness. There was no evidence for stenosis. There was mild regurgitation. Pressure half time 861 msec. MITRAL VALVE: Valve structure was normal. There was no evidence for stenosis. There was mild regurgitation. TRICUSPID VALVE: The valve structure was normal. There was no evidence for  stenosis. There was moderate to severe regurgitation. PULMONIC VALVE: The valve structure was normal. There was no evidence for  stenosis. There was mild regurgitation. PERICARDIUM: There was no pericardial effusion. AORTA: The root exhibited normal size. SUMMARY:    -  Left ventricle: Systolic function was at the lower limits of normal.  Ejection fraction was estimated in the range of 50 % to 55 %. There were no  regional wall motion abnormalities. Septal flattening consistent with RV  pressure overload. -  Right ventricle: The ventricle was dilated. Estimated peak pressure was in  the range of 90-95 mmHg. -  Right atrium: The atrium was mildly dilated. -  Inferior vena cava, hepatic veins: The inferior vena cava was dilated. The  respirophasic change in diameter was less than 50%. -  Mitral valve: There was mild regurgitation.    -  Tricuspid valve: There was moderate to severe regurgitation. SYSTEM MEASUREMENT TABLES    2D mode  AoR Diam (2D): 2.7 cm  LA Dimension (2D): 3.7 cm  Left Atrium Systolic Volume Index; Method of Disks, Biplane; 2D mode;: 21.6  ml/m2  IVS/LVPW (2D): 1  IVSd (2D): 0.9 cm  LVIDd (2D): 4.5 cm  LVIDs (2D): 2.3 cm  LVPWd (2D): 0.9 cm  RVIDd (2D): 4.1 cm    Unspecified Scan Mode  Peak Grad; Mean; Antegrade Flow: 11 mm[Hg]  Vmax;  Antegrade Flow: 169 cm/s    Prepared and signed by     25-10 Th La [place]anca, DO  Signed 03-May-2021 16:15:09         Cultures: All Micro Results     Procedure Component Value Units Date/Time    CULTURE, BLOOD [[id number]] Collected: 05/03/21 1352    Order Status: Completed Specimen: Blood Updated: 05/08/21 0942     Special Requests: --        RIGHT  Antecubital       Culture result: NO GROWTH 5 DAYS       CULTURE, URINE [[id number]] Collected: 05/03/21 0948    Order Status: Completed Specimen: Cath Urine Updated: 05/06/21 0720     Special Requests: NO SPECIAL REQUESTS        Culture result: NO GROWTH 2 DAYS       SARS-COV-2, PCR [[id number]] Collected: 05/04/21 1650    Order Status: Completed Specimen: Nasopharyngeal Updated: 05/05/21 1042     Specimen source Nasopharyngeal        SARS-CoV-2 Not detected        Comment:      The specimen is NEGATIVE for SARS-CoV-2, the novel coronavirus associated with COVID-19. This test has been authorized by the FDA under an Emergency Use Authorization (EUA) for use by authorized laboratories. Fact sheet for Healthcare Providers: [employer]       Fact sheet for Patients: [employer]       Methodology: RT-PCR         COVID-19 RAPID TEST [[id number]] Collected: 05/04/21 1650    Order Status: Completed Specimen: Nasopharyngeal Updated: 05/04/21 1828     Specimen source Nasopharyngeal        COVID-19 rapid test Not detected        Comment:      The specimen is NEGATIVE for SARS-CoV-2, the novel coronavirus associated with COVID-19. A negative result does not rule out COVID-19. This test has been authorized by the FDA under an Emergency Use Authorization (EUA) for use by authorized laboratories.         Fact sheet for Healthcare Providers: [employer]  Fact sheet for Patients: [employer]       Methodology: Isothermal Nucleic Acid Amplification               Assessment:     Primary Diagnosis: Acute respiratory failure with hypoxia [facility])    Hospital Problems as of 5/8/2021 Date Reviewed: 12/21/2020          Codes Class Noted - Resolved POA    * (Principal) Acute respiratory failure with hypoxia ([facility] ICD-10-CM: J96.01  ICD-9-CM: 518.81  5/3/2021 - Present Yes        Pneumonia due to Escherichia coli [facility]) ICD-10-CM: J15.5  ICD-9-CM: 482.82  5/3/2021 - Present Yes        Acute on chronic diastolic congestive heart failure (HCC) ICD-10-CM: I50.33  ICD-9-CM: 428.33, 428.0  10/27/2016 - Present Yes        Acute metabolic encephalopathy LRP-53-DV: G93.41  ICD-9-CM: 348.31  5/3/2021 - Present Yes        Stage 3 chronic kidney disease ([facility] ICD-10-CM: N18.30  ICD-9-CM: 585.3  6/18/2020 - Present Yes        Paroxysmal atrial fibrillation ([facility] ICD-10-CM: I48.0  ICD-9-CM: 427.31  6/12/2019 - Present Yes        Diabetes mellitus type II, non insulin dependent (HCC) (Chronic) ICD-10-CM: E11.9  ICD-9-CM: 250.00  7/23/2014 - Present Yes        PVD (peripheral vascular disease) ([facility] ICD-10-CM: I73.9  ICD-9-CM: 443.9  10/27/2016 - Present Yes        Class 1 obesity due to excess calories with serious comorbidity and body mass index (BMI) of 32.0 to 32.9 in adult ICD-10-CM: E66.09, Z68.32  ICD-9-CM: 278.00, V85.32  8/12/2019 - Present Yes        Hyperlipidemia (Chronic) ICD-10-CM: E78.5  ICD-9-CM: 272.4  7/23/2014 - Present Yes        RESOLVED: Hallucinations ICD-10-CM: R44.3  ICD-9-CM: 780.1  5/3/2021 - 5/4/2021 Yes                Discharge Plan:     Medically stable for discharge to [facility]    Signed By: Kathie Erazo DO     May 8, 2021

## 2021-05-09 LAB
ALBUMIN SERPL-MCNC: 3.1 G/DL (ref 3.2–4.6)
ALBUMIN/GLOB SERPL: 0.8 {RATIO} (ref 1.2–3.5)
ALP SERPL-CCNC: 182 U/L (ref 50–136)
ALT SERPL-CCNC: 43 U/L (ref 12–65)
ANION GAP SERPL CALC-SCNC: 2 MMOL/L (ref 7–16)
AST SERPL-CCNC: 55 U/L (ref 15–37)
BILIRUB SERPL-MCNC: 1.6 MG/DL (ref 0.2–1.1)
BUN SERPL-MCNC: 18 MG/DL (ref 8–23)
CALCIUM SERPL-MCNC: 9.4 MG/DL (ref 8.3–10.4)
CHLORIDE SERPL-SCNC: 105 MMOL/L (ref 98–107)
CO2 SERPL-SCNC: 32 MMOL/L (ref 21–32)
CREAT SERPL-MCNC: 0.96 MG/DL (ref 0.8–1.5)
ERYTHROCYTE [DISTWIDTH] IN BLOOD BY AUTOMATED COUNT: 16.9 % (ref 11.9–14.6)
GLOBULIN SER CALC-MCNC: 3.7 G/DL (ref 2.3–3.5)
GLUCOSE BLD STRIP.AUTO-MCNC: 101 MG/DL (ref 65–100)
GLUCOSE BLD STRIP.AUTO-MCNC: 79 MG/DL (ref 65–100)
GLUCOSE BLD STRIP.AUTO-MCNC: 85 MG/DL (ref 65–100)
GLUCOSE BLD STRIP.AUTO-MCNC: 97 MG/DL (ref 65–100)
GLUCOSE SERPL-MCNC: 91 MG/DL (ref 65–100)
HCT VFR BLD AUTO: 41.9 % (ref 41.1–50.3)
HGB BLD-MCNC: 13.4 G/DL (ref 13.6–17.2)
MCH RBC QN AUTO: 32.1 PG (ref 26.1–32.9)
MCHC RBC AUTO-ENTMCNC: 32 G/DL (ref 31.4–35)
MCV RBC AUTO: 100.5 FL (ref 79.6–97.8)
NRBC # BLD: 0 K/UL (ref 0–0.2)
PLATELET # BLD AUTO: 191 K/UL (ref 150–450)
PMV BLD AUTO: 12.1 FL (ref 9.4–12.3)
POTASSIUM SERPL-SCNC: 4 MMOL/L (ref 3.5–5.1)
PROT SERPL-MCNC: 6.8 G/DL (ref 6.3–8.2)
RBC # BLD AUTO: 4.17 M/UL (ref 4.23–5.6)
SERVICE CMNT-IMP: ABNORMAL
SERVICE CMNT-IMP: NORMAL
SODIUM SERPL-SCNC: 139 MMOL/L (ref 138–145)
WBC # BLD AUTO: 5.7 K/UL (ref 4.3–11.1)

## 2021-05-09 PROCEDURE — 36415 COLL VENOUS BLD VENIPUNCTURE: CPT

## 2021-05-09 PROCEDURE — 3331090002 HH PPS REVENUE DEBIT

## 2021-05-09 PROCEDURE — 74011250637 HC RX REV CODE- 250/637: Performed by: INTERNAL MEDICINE

## 2021-05-09 PROCEDURE — 85027 COMPLETE CBC AUTOMATED: CPT

## 2021-05-09 PROCEDURE — 65270000029 HC RM PRIVATE

## 2021-05-09 PROCEDURE — 97530 THERAPEUTIC ACTIVITIES: CPT

## 2021-05-09 PROCEDURE — 82962 GLUCOSE BLOOD TEST: CPT

## 2021-05-09 PROCEDURE — 80053 COMPREHEN METABOLIC PANEL: CPT

## 2021-05-09 PROCEDURE — 3331090001 HH PPS REVENUE CREDIT

## 2021-05-09 RX ADMIN — APIXABAN 5 MG: 5 TABLET, FILM COATED ORAL at 22:39

## 2021-05-09 RX ADMIN — FINASTERIDE 5 MG: 5 TABLET, FILM COATED ORAL at 08:43

## 2021-05-09 RX ADMIN — ATORVASTATIN CALCIUM 80 MG: 80 TABLET, FILM COATED ORAL at 22:38

## 2021-05-09 RX ADMIN — ASPIRIN 81 MG: 81 TABLET ORAL at 08:43

## 2021-05-09 RX ADMIN — METOPROLOL TARTRATE 25 MG: 25 TABLET, FILM COATED ORAL at 08:43

## 2021-05-09 RX ADMIN — LISINOPRIL 5 MG: 5 TABLET ORAL at 08:43

## 2021-05-09 RX ADMIN — APIXABAN 5 MG: 5 TABLET, FILM COATED ORAL at 08:43

## 2021-05-09 RX ADMIN — ALLOPURINOL 300 MG: 300 TABLET ORAL at 08:43

## 2021-05-09 NOTE — PROGRESS NOTES
[facility] Hospitalist Progress Note    Patient: Roman Padilla MRN: [id number]  SSN: [id number]    YOB: 1940  Age: 79 y.o. Sex: male      Admit Date: 5/3/2021    LOS: 6 days     Subjective:     Chief Complaint: Confusion and SOB  Reason for Admission: Acute respiratory failure with hypoxia ([address]    79 y.o. male with medical h/o chronic dCHF, COPD and diabetes who presented to [facility] ED with a complaint of abnormal behaviors, SOB and a general decline over the last several weeks. He was found to have acute respiratory failure due to acute dCHF and pneumonia. He was started on Lasix and Doxycyline. 5/9 - He feels good today. Left leg ulcer pain improved. Denies CP/SOB. Denies F/C. Denies V/D. Review of systems negative except stated above. Assessment/Plan (MDM):      Active Medical Complaints and Diagnoses:    Principal Problem:    Acute respiratory failure with hypoxia ([address] (5/3/2021)  - Satting 85% on room air in ER  - Resolved, now on room air  - Due to acute dCHF + pneumonia  - Continue Doxycycline - 5/10/21  - Lasix PRN - per Cardiology  - Wean oxygen as appropriate    Active Problems:    Acute on chronic diastolic congestive heart failure ([address] (10/27/2016)  - BNP 2,274 + CXR evidence  - Lasix PRN - per Cardiology  - Continue Lopressor  - Continue Lisinopril      Pneumonia due to Escherichia coli (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 (6/18/2020)  - Stable      PVD (peripheral vascular disease) ([facility] (10/27/2016)      Hyperlipidemia (7/23/2014)      Class 1 obesity due to excess calories with serious comorbidity and body mass index (BMI) of 32.0 to 32.9 in adult (8/12/2019)      Otherwise all chronic medical issues appear stable with no changes. See assessment at bottom of progress note for complete acute, chronic, and resolved hospital medical issues. Diet: DIET CARDIAC Regular; Consistent Carb 1800kcal  VTE ppx: Eliquis    Objective:     Visit Vitals  /78 (BP 1 Location: Right arm, BP Patient Position: At rest)   Pulse 84   Temp 97.8 °F (36.6 °C)   Resp 18   Ht 6' (1.829 m)   Wt 87.7 kg (193 lb 4.8 oz)   SpO2 93%   BMI 26.22 kg/m²      Oxygen Therapy  O2 Sat (%): 93 % (05/09/21 0738)  Pulse via Oximetry: 81 beats per minute (05/08/21 2111)  O2 Device: Nasal cannula (05/08/21 2111)  Skin Assessment: Clean, dry, & intact (05/04/21 1048)  Skin Protection for O2 Device: No (05/04/21 1048)  O2 Flow Rate (L/min): 4 l/min (05/08/21 2111)  FIO2 (%): 32 % (05/05/21 1006)      Intake and Output:     Intake/Output Summary (Last 24 hours) at 5/9/2021 0932  Last data filed at 5/9/2021 4145  Gross per 24 hour   Intake 720 ml   Output 400 ml   Net 320 ml         Physical Exam:   GENERAL: alert, cooperative, no distress, appears stated age  EYE: conjunctivae/corneas clear. PERRL. THROAT & NECK: normal and no erythema or exudates noted. LUNG: clear to auscultation bilaterally  HEART: irregular rate and rhythm, S1S2, no murmur, no JVD  ABDOMEN: soft, non-tender, non-distended. Bowel sounds normal.   EXTREMITIES:  Trace edema, Legs wrapped in ACE bandage  SKIN: no rash or abnormalities  NEUROLOGIC: A&Ox2-3. Cranial nerves 2-12 grossly intact.     Lab/Data Review:  Recent Results (from the past 24 hour(s))   GLUCOSE, POC    Collection Time: 05/08/21 10:22 AM   Result Value Ref Range    Glucose (POC) 108 (H) 65 - 100 mg/dL    Performed by Louis    GLUCOSE, POC    Collection Time: 05/08/21  3:39 PM   Result Value Ref Range    Glucose (POC) 85 65 - 100 mg/dL    Performed by Louis    GLUCOSE, POC    Collection Time: 05/08/21  8:33 PM   Result Value Ref Range    Glucose (POC) 91 65 - 100 mg/dL    Performed by Fern Edmonds    GLUCOSE, POC    Collection Time: 05/09/21  7:43 AM   Result Value Ref Range    Glucose (POC) 79 65 - 100 mg/dL    Performed by SMCpros        SARS-CoV-2 Lab Results  \"Novel Coronavirus\" Test: No results found for: COV2NT   \"Emergent Disease\" Test: No results found for: EDPR  \"SARS-COV-2\" Test: No results found for: XGCOVT  \"Precision Labs\" Test: No results found for: RSLT  Rapid Test:   Lab Results   Component Value Date/Time    COVR Not detected 05/04/2021 04:50 PM           Imaging:  Xr Chest Pa Lat    Result Date: 4/28/2021  Chest 2 view CLINICAL INDICATION: Acute severe Weakness, difficulty walking, fell yesterday, altered mental status COMPARISON: 6/11/2020, 8/12/2019 TECHNIQUE: Sitting upright frontal and lateral views of the chest FINDINGS: Lung volumes are well inflated. Cardiomediastinal silhouette and hilar contours are stable. Minimal linear atelectasis or scarring is scattered in both bases. The lungs demonstrating no consolidation, pneumothorax, pleural effusion or CHF. No acute osseous abnormalities are seen. No acute airspace disease. Ct Head Wo Cont    Result Date: 5/3/2021  EXAMINATION: HEAD CT WITHOUT CONTRAST 5/3/2021 9:34 AM ACCESSION NUMBER: [id number] INDICATION: ALTERED mental status COMPARISON: Brain MRI 12/5/2016, head CT 12/5/2016 TECHNIQUE: Multiple-row detector helical CT examination of the head without intravenous contrast. Radiation dose reduction techniques were used for this study:  Our CT scanners use one or all of the following: Automated exposure control, adjustment of the mA and/or kVp according to patient's size, iterative reconstruction.  FINDINGS: The ventricles are stable in caliber in comparison to the prior exam and are appropriate for the mild degree of symmetric global brain parenchymal volume loss. There is no midline shift. The basilar cisterns are patent. There is no cerebellar tonsillar ectopia or herniation. Hypodensities in the periventricular and subcortical white matter are nonspecific, but they are most likely to represent chronic microangiopathy. Prior bilateral lens replacement. Subtle left precentral gyrus encephalomalacia at the site of the infarction demonstrated on 12/5/2016 exam.  There is no evidence of acute intracranial hemorrhage or extra-axial collections. There is no CT evidence of acute infarction. The paranasal sinuses and mastoid air cells are well aerated and clear. 1. No acute intracranial abnormality. 2. Chronic findings as above. VOICE DICTATED BY: Dr. Shyla Alvarado     Xr Chest Port    Result Date: 5/3/2021  CHEST X-RAY, single portable view  5/3/2021 History: Altered mental status/confusion. Technique: Single frontal view of the chest. Comparison: Chest x-ray 4/28/2020 Findings: The cardiac silhouette is moderately enlarged although stable. The lungs are expanded without evidence for pneumothorax. New mild left basilar airspace changes and small left basilar effusion are seen. 1.  Stable cardiomegaly with new mild left basilar airspace changes and small left basilar effusion. Given the patient's acute symptoms, this is felt to most likely to represent pneumonia versus an atypical presentation of heart failure. Recommend clinical correlation. This report was made using voice transcription. Despite my best efforts to avoid any, transcription errors may persist. If there is any question about the accuracy of the report or need for clarification, then please call [phone], or text me through perfectserv for clarification or correction. Xr Knee Lt 3 V    Result Date: 4/28/2021  EXAM:  XR KNEE LT 3 V INDICATION:   Left knee pain after fall COMPARISON: None.  FINDINGS: Three views of the left knee demonstrate end-stage tricompartmental secondary osteoarthritis. Small joint effusion. No evidence of fractures or dislocations. .      End-stage tricompartmental secondary osteoarthritis with small joint effusion. Results for orders placed or performed during the hospital encounter of 21   2D ECHO COMPLETE ADULT (TTE) W OR [address] [facility]  [address], [address] [facility]  [phone]    Transthoracic Echocardiogram  2D, M-mode, Doppler, and Color Doppler    Patient: Lili Pelayo  MR #: [id number]  : 1940  Age: 79 years  Gender: Male  Study date: 03-May-2021  Account #: [de-identified]  Height: 72 in  Weight: 229.5 lb  BSA: 2.26 mï¾²  Status:Routine  Location: [facility]  BP: 138/ 70    Allergies: NO KNOWN ALLERGENS    Sonographer:  Claudia Cain  Group:  [facility]  Referring Physician:  Jeanine Almeida. Tenzin Gonzalez MD  Reading Physician:  DR. NAE PIMENTEL DO    INDICATIONS: Heart failure. Systolic, Chronic. PROCEDURE: This was a routine study. A transthoracic echocardiogram was  performed. The study included complete 2D imaging, M-mode, complete spectral  Doppler, and color Doppler. Intravenous contrast (Definity) was administered. This was a technically difficult study. LEFT VENTRICLE: Size was normal. Systolic function was at the lower limits of  normal. Ejection fraction was estimated in the range of 50 % to 55 %. There  were no regional wall motion abnormalities. Septal flattening consistent with  RV pressure overload. Wall thickness was normal. The study was not   technically  sufficient to allow evaluation of LV diastolic function. RIGHT VENTRICLE: The ventricle was dilated. Systolic function was normal.  Estimated peak pressure was in the range of 90-95 mmHg. LEFT ATRIUM: Size was normal.    RIGHT ATRIUM: The atrium was mildly dilated.     SYSTEMIC VEINS: IVC: The inferior vena cava was dilated. The respirophasic  change in diameter was less than 50%. AORTIC VALVE: The valve was trileaflet. Leaflets exhibited mildly increased  thickness. There was no evidence for stenosis. There was mild regurgitation. Pressure half time 861 msec. MITRAL VALVE: Valve structure was normal. There was no evidence for stenosis. There was mild regurgitation. TRICUSPID VALVE: The valve structure was normal. There was no evidence for  stenosis. There was moderate to severe regurgitation. PULMONIC VALVE: The valve structure was normal. There was no evidence for  stenosis. There was mild regurgitation. PERICARDIUM: There was no pericardial effusion. AORTA: The root exhibited normal size. SUMMARY:    -  Left ventricle: Systolic function was at the lower limits of normal.  Ejection fraction was estimated in the range of 50 % to 55 %. There were no  regional wall motion abnormalities. Septal flattening consistent with RV  pressure overload. -  Right ventricle: The ventricle was dilated. Estimated peak pressure was in  the range of 90-95 mmHg. -  Right atrium: The atrium was mildly dilated. -  Inferior vena cava, hepatic veins: The inferior vena cava was dilated. The  respirophasic change in diameter was less than 50%. -  Mitral valve: There was mild regurgitation.    -  Tricuspid valve: There was moderate to severe regurgitation. SYSTEM MEASUREMENT TABLES    2D mode  AoR Diam (2D): 2.7 cm  LA Dimension (2D): 3.7 cm  Left Atrium Systolic Volume Index; Method of Disks, Biplane; 2D mode;: 21.6  ml/m2  IVS/LVPW (2D): 1  IVSd (2D): 0.9 cm  LVIDd (2D): 4.5 cm  LVIDs (2D): 2.3 cm  LVPWd (2D): 0.9 cm  RVIDd (2D): 4.1 cm    Unspecified Scan Mode  Peak Grad; Mean; Antegrade Flow: 11 mm[Hg]  Vmax; Antegrade Flow: 169 cm/s    Prepared and signed by     [address] [place] [address],   Signed 03-May-2021 16:15:09         Cultures:   All Micro Results     Procedure Component Value Units Date/Time    CULTURE, BLOOD [[id number]] Collected: 05/03/21 1352    Order Status: Completed Specimen: Blood Updated: 05/08/21 0942     Special Requests: --        RIGHT  Antecubital       Culture result: NO GROWTH 5 DAYS       CULTURE, URINE [[id number]] Collected: 05/03/21 0948    Order Status: Completed Specimen: Cath Urine Updated: 05/06/21 0720     Special Requests: NO SPECIAL REQUESTS        Culture result: NO GROWTH 2 DAYS       SARS-COV-2, PCR [[id number]] Collected: 05/04/21 1650    Order Status: Completed Specimen: Nasopharyngeal Updated: 05/05/21 1042     Specimen source Nasopharyngeal        SARS-CoV-2 Not detected        Comment:      The specimen is NEGATIVE for SARS-CoV-2, the novel coronavirus associated with COVID-19. This test has been authorized by the FDA under an Emergency Use Authorization (EUA) for use by authorized laboratories. Fact sheet for Healthcare Providers: TimeGenius.nz       Fact sheet for Patients: TimeGenius.nz       Methodology: RT-PCR         COVID-19 RAPID TEST [[id number]] Collected: 05/04/21 1650    Order Status: Completed Specimen: Nasopharyngeal Updated: 05/04/21 1828     Specimen source Nasopharyngeal        COVID-19 rapid test Not detected        Comment:      The specimen is NEGATIVE for SARS-CoV-2, the novel coronavirus associated with COVID-19. A negative result does not rule out COVID-19. This test has been authorized by the FDA under an Emergency Use Authorization (EUA) for use by authorized laboratories.         Fact sheet for Healthcare Providers: Playmysongco.nz  Fact sheet for Patients: FTBpro.co.nz       Methodology: Isothermal Nucleic Acid Amplification               Assessment:     Primary Diagnosis: Acute respiratory failure with hypoxia [facility]    Hospital Problems as of 5/9/2021 Date Reviewed: 12/21/2020          Codes Class Noted - Resolved POA    * (Principal) Acute respiratory failure with hypoxia [facility]) ICD-10-CM: J96.01  ICD-9-CM: 518.81  5/3/2021 - Present Yes        Pneumonia due to Escherichia coli [facility]) ICD-10-CM: J15.5  ICD-9-CM: 482.82  5/3/2021 - Present Yes        Acute on chronic diastolic congestive heart failure (HCC) ICD-10-CM: I50.33  ICD-9-CM: 428.33, 428.0  10/27/2016 - Present Yes        Acute metabolic encephalopathy CNR-13-ON: G93.41  ICD-9-CM: 348.31  5/3/2021 - Present Yes        Stage 3 chronic kidney disease ([facility] ICD-10-CM: N18.30  ICD-9-CM: 585.3  6/18/2020 - Present Yes        Paroxysmal atrial fibrillation (HCC) ICD-10-CM: I48.0  ICD-9-CM: 427.31  6/12/2019 - Present Yes        Diabetes mellitus type II, non insulin dependent (HCC) (Chronic) ICD-10-CM: E11.9  ICD-9-CM: 250.00  7/23/2014 - Present Yes        PVD (peripheral vascular disease) ([facility] ICD-10-CM: I73.9  ICD-9-CM: 443.9  10/27/2016 - Present Yes        Class 1 obesity due to excess calories with serious comorbidity and body mass index (BMI) of 32.0 to 32.9 in adult ICD-10-CM: E66.09, Z68.32  ICD-9-CM: 278.00, V85.32  8/12/2019 - Present Yes        Hyperlipidemia (Chronic) ICD-10-CM: E78.5  ICD-9-CM: 272.4  7/23/2014 - Present Yes        RESOLVED: Hallucinations ICD-10-CM: R44.3  ICD-9-CM: 780.1  5/3/2021 - 5/4/2021 Yes                Discharge Plan:     Medically stable for discharge to [facility]    Signed By: Rick Moody DO     May 9, 2021

## 2021-05-09 NOTE — PROGRESS NOTES
Pt noted to have irritation around genitals from external germain. Pt stated he can use urinal. Urinal at bedside and within reach. Hourly rounds performed through shift, pt denies needs at this time. Bed in low position, locked and call light/personal items within reach. Will continue to monitor and report to night shift nurse.

## 2021-05-09 NOTE — PROGRESS NOTES
ACUTE PHYSICAL THERAPY GOALS:  (Developed with and agreed upon by patient and/or caregiver.)  (1.) Saritha Beverly will move from supine to sit and sit to supine , scoot up and down and roll side to side with STAND BY ASSIST within 7 treatment day(s). (2.) Saritha Beverly will transfer from bed to chair and chair to bed with STAND BY ASSIST using the least restrictive device within 7 treatment day(s). (3.) Saritha Beverly will ambulate with CONTACT GUARD ASSIST for 300 feet with the least restrictive device within 7 treatment day(s). (4.) Saritha Beverly will perform standing static and dynamic balance activities x 25 minutes with CONTACT GUARD ASSIST to improve safety within 7 treatment day(s). (5.) Saritha Beverly will perform therapeutic exercises x 15 min for HEP with INDEPENDENCE to improve strength, endurance, and functional mobility within 7 treatment day(s). PHYSICAL THERAPY: Daily Note and AM Treatment Day # 3    Saritha Beverly is a 79 y.o. male   PRIMARY DIAGNOSIS: Acute respiratory failure with hypoxia ([address]  Acute metabolic encephalopathy [A52.90]  Respiratory failure with hypoxia (HCC) [J96.91]  CHF (congestive heart failure) ([address] [I50.9]  Pneumonia [J18.9]  Hallucinations [R44.3]         ASSESSMENT:     REHAB RECOMMENDATIONS: CURRENT LEVEL OF FUNCTION:  (Most Recently Demonstrated)   Recommendation to date pending progress:  Setting:   Short-term Rehab  Equipment:    None Bed Mobility:   Modified Independent  Sit to Stand:  [employer] Assistance  Transfers:   Contact Guard Assistance  Gait/Mobility:   Contact Guard Assistance     ASSESSMENT:  Mr. Donita Garcia is an 79year old M who presents to hospital with acute metabolic encephalopathy, CHF. Today he continues to make good progress with therapy. He got himself to the EOB and was able to wash himself up standing with 2 seated rest breaks.    He then walked with the walker in the hallway with CGA. He was on RA with sats 89% upon return so donned 2L. Steady progress     SUBJECTIVE:   Mr. Melania Calvo states, \"I would love to get up\"    SOCIAL HISTORY/ LIVING ENVIRONMENT:  Lives alone, his cousin checks in on him and assists as needed. Has cane and RW for mobility.   Home Environment: Apartment  # Steps to Enter: 0  One/Two Story Residence: One story  Living Alone: Yes  Support Systems: Family member(s)  OBJECTIVE:     PAIN: VITAL SIGNS: LINES/DRAINS:   Pre Treatment: Pain Screen  Pain Scale 1: FLACC  Pain Intensity 1: 0  Post Treatment: 0/10   [place]  O2 Device: Nasal cannula     MOBILITY: I Mod I S SBA CGA Min Mod Max Total  NT x2 Comments:   Bed Mobility    Rolling [] [] [] [] [] [] [] [] [] [x] []    Supine to Sit [] [x] [] [] [] [] [] [] [] [] []    Scooting [] [] [] [x] [] [] [] [] [] [] []    Sit to Supine [] [] [] [] [] [] [] [] [] [x] []    Transfers    Sit to Stand [] [] [] [] [x] [x] [] [] [] [] []    Bed to Chair [] [] [] [] [x] [] [] [] [] [] []    Stand to Sit [] [] [] [] [x] [] [] [] [] [] []    I=Independent, Mod I=Modified Independent, S=Supervision, SBA=Standby Assistance, CGA=Contact Guard Assistance,   Min=Minimal Assistance, Mod=Moderate Assistance, Max=Maximal Assistance, Total=Total Assistance, NT=Not Tested    BALANCE: Good Fair+ Fair Fair- Poor NT Comments   Sitting Static [] [x] [] [] [] []    Sitting Dynamic [] [x] [] [] [] []              Standing Static [] [] [x] [] [] []    Standing Dynamic [] [] [x] [] [] []      GAIT: I Mod I S SBA CGA Min Mod Max Total  NT x2 Comments:   Level of Assistance [] [] [] [] [x] [] [] [] [] [] []    Distance 200 ft    DME Rolling Walker and Gait Belt    Gait Quality Slow, shuffled steps    Weightbearing  Status N/A     I=Independent, Mod I=Modified Independent, S=Supervision, SBA=Standby Assistance, CGA=Contact Guard Assistance,   Min=Minimal Assistance, Mod=Moderate Assistance, Max=Maximal Assistance, Total=Total Assistance, NT=Not Tested    PLAN:   FREQUENCY/DURATION: PT Plan of Care: 3 times/week for duration of hospital stay or until stated goals are met, whichever comes first.  TREATMENT:     TREATMENT:   ($$ Therapeutic Activity: 38-52 mins    )  Therapeutic Activity (40 Minutes): Therapeutic activity included Supine to Sit, Scooting, Transfer Training, Ambulation on level ground, Sitting balance  and Standing balance to improve functional Mobility, Strength and Activity tolerance.     TREATMENT GRID:  N/A    AFTER TREATMENT POSITION/PRECAUTIONS:  Alarm Activated, Chair, Needs within reach and RN notified    INTERDISCIPLINARY COLLABORATION:  RN/PCT and PT/PTA    TOTAL TREATMENT DURATION:  PT Patient Time In/Time Out  Time In: 1015  Time Out: [address], PTA

## 2021-05-09 NOTE — PROGRESS NOTES
Hourly rounds completed. Denies pain or SOB, remains on 4L NC. All needs met at this time, currently resting in bed. Call light within reach, will give report to oncoming RN.

## 2021-05-10 VITALS
DIASTOLIC BLOOD PRESSURE: 61 MMHG | RESPIRATION RATE: 18 BRPM | HEIGHT: 72 IN | HEART RATE: 86 BPM | WEIGHT: 194.1 LBS | OXYGEN SATURATION: 97 % | SYSTOLIC BLOOD PRESSURE: 111 MMHG | TEMPERATURE: 97.5 F | BODY MASS INDEX: 26.29 KG/M2

## 2021-05-10 PROBLEM — J15.5 PNEUMONIA DUE TO ESCHERICHIA COLI (HCC): Status: RESOLVED | Noted: 2021-05-03 | Resolved: 2021-05-10

## 2021-05-10 PROBLEM — G93.41 ACUTE METABOLIC ENCEPHALOPATHY: Status: RESOLVED | Noted: 2021-05-03 | Resolved: 2021-05-10

## 2021-05-10 LAB
GLUCOSE BLD STRIP.AUTO-MCNC: 83 MG/DL (ref 65–100)
GLUCOSE BLD STRIP.AUTO-MCNC: 95 MG/DL (ref 65–100)
SERVICE CMNT-IMP: NORMAL
SERVICE CMNT-IMP: NORMAL

## 2021-05-10 PROCEDURE — 2709999900 HC NON-CHARGEABLE SUPPLY

## 2021-05-10 PROCEDURE — 29581 APPL MULTLAYER CMPRN SYS LEG: CPT

## 2021-05-10 PROCEDURE — 3331090001 HH PPS REVENUE CREDIT

## 2021-05-10 PROCEDURE — 74011250637 HC RX REV CODE- 250/637: Performed by: INTERNAL MEDICINE

## 2021-05-10 PROCEDURE — 97605 NEG PRS WND THER DME<=50SQCM: CPT

## 2021-05-10 PROCEDURE — 97530 THERAPEUTIC ACTIVITIES: CPT

## 2021-05-10 PROCEDURE — 3331090002 HH PPS REVENUE DEBIT

## 2021-05-10 PROCEDURE — 82962 GLUCOSE BLOOD TEST: CPT

## 2021-05-10 RX ORDER — METOPROLOL TARTRATE 25 MG/1
25 TABLET, FILM COATED ORAL 2 TIMES DAILY
Qty: 60 TAB | Refills: 0 | Status: SHIPPED
Start: 2021-05-10

## 2021-05-10 RX ORDER — OXYCODONE HYDROCHLORIDE 5 MG/1
5 TABLET ORAL
Qty: 12 TAB | Refills: 0 | Status: SHIPPED | OUTPATIENT
Start: 2021-05-10 | End: 2021-05-13

## 2021-05-10 RX ORDER — LOSARTAN POTASSIUM 25 MG/1
25 TABLET ORAL DAILY
Qty: 30 TAB | Refills: 0 | Status: SHIPPED
Start: 2021-05-10

## 2021-05-10 RX ADMIN — METOPROLOL TARTRATE 25 MG: 25 TABLET, FILM COATED ORAL at 09:53

## 2021-05-10 RX ADMIN — APIXABAN 5 MG: 5 TABLET, FILM COATED ORAL at 09:53

## 2021-05-10 RX ADMIN — ALLOPURINOL 300 MG: 300 TABLET ORAL at 09:54

## 2021-05-10 RX ADMIN — FINASTERIDE 5 MG: 5 TABLET, FILM COATED ORAL at 09:53

## 2021-05-10 RX ADMIN — ASPIRIN 81 MG: 81 TABLET ORAL at 09:53

## 2021-05-10 NOTE — DISCHARGE SUMMARY
Hospitalist Discharge Summary     Patient ID:  Dexter Tapia  [id number]  17 y.o.  1940  Admit date: 5/3/2021  Discharge date: 5/10/2021  Attending: Ana Cristina Olivarez DO  PCP:  Nabila Russell MD  Treatment Team: Attending Provider: Ana Cristina Olivarez DO; Care Manager: Korina Canela RN; Utilization Review: Abby Mason RN; Charge Nurse: Sylvia Freitas; Occupational Therapist: Georg Gilford, OT; Physical Therapy Assistant: Brayan Mcclain PTA    Principal Diagnosis Acute respiratory failure with hypoxia [facility])    Hospital Problems as of 5/10/2021 Date Reviewed: 12/21/2020          Codes Class Noted - Resolved POA    * (Principal) Acute respiratory failure with hypoxia ([facility] ICD-10-CM: J96.01  ICD-9-CM: 518.81  5/3/2021 - Present Yes        Acute on chronic diastolic congestive heart failure ([facility] ICD-10-CM: I50.33  ICD-9-CM: 428.33, 428.0  10/27/2016 - Present Yes        RESOLVED: Pneumonia due to Escherichia coli [facility]) ICD-10-CM: J15.5  ICD-9-CM: 482.82  5/3/2021 - 5/10/2021 Yes        RESOLVED: Acute metabolic encephalopathy [id number]: G93.41  ICD-9-CM: 348.31  5/3/2021 - 5/10/2021 Yes        Stage 3 chronic kidney disease ([facility] ICD-10-CM: N18.30  ICD-9-CM: 585.3  6/18/2020 - Present Yes        Paroxysmal atrial fibrillation ([facility] ICD-10-CM: I48.0  ICD-9-CM: 427.31  6/12/2019 - Present Yes        Diabetes mellitus type II, non insulin dependent (HCC) (Chronic) ICD-10-CM: E11.9  ICD-9-CM: 250.00  7/23/2014 - Present Yes        PVD (peripheral vascular disease) ([facility] ICD-10-CM: I73.9  ICD-9-CM: 443.9  10/27/2016 - Present Yes        Class 1 obesity due to excess calories with serious comorbidity and body mass index (BMI) of 32.0 to 32.9 in adult ICD-10-CM: E66.09, K74.59  ICD-9-CM: 278.00, V85.32  8/12/2019 - Present Yes        Hyperlipidemia (Chronic) ICD-10-CM: E78.5  ICD-9-CM: 272.4  7/23/2014 - Present Yes        RESOLVED: Hallucinations ICD-10-CM: R44.3  ICD-9-CM: 780.1  5/3/2021 - 5/4/2021 Yes              Hospital Course:  Please refer to the admission H&P for details of presentation. In summary, the patient is a 79 y. o. male with medical h/o chronic dCHF, COPD and diabetes who presented to [facility] ED with a complaint of abnormal behaviors, SOB and a general decline over the last several weeks. He was found to have acute respiratory failure due to acute dCHF and pneumonia. He was started on  IV Lasix and Doxycyline. He got 3 doses of IV Lasix and felt much improved. His encephalopathy resolved. Wound care evaluated him and wrapped his legs. He completed 7 days of Doxycycline. He returned to baseline and remained stable. He was discharged to SNF for further care. Significant Diagnostic Studies:    Labs: Results:       Chemistry Recent Labs     05/09/21  0955   GLU 91      K 4.0      CO2 32   BUN 18   CREA 0.96   CA 9.4   AGAP 2*   *   TP 6.8   ALB 3.1*   GLOB 3.7*   AGRAT 0.8*      CBC w/Diff Recent Labs     05/09/21  0955   WBC 5.7   RBC 4.17*   HGB 13.4*   HCT 41.9         Cardiac Enzymes No results for input(s): CPK, CKND1, ELLA in the last 72 hours. No lab exists for component: CKRMB, TROIP   Coagulation No results for input(s): PTP, INR, APTT, INREXT in the last 72 hours. Lipid Panel Lab Results   Component Value Date/Time    Cholesterol, total 119 06/11/2020 11:26 AM    HDL Cholesterol 40 06/11/2020 11:26 AM    LDL,Direct 90 01/15/2021 12:34 PM    LDL, calculated 68 06/11/2020 11:26 AM    VLDL, calculated 11 06/11/2020 11:26 AM    Triglyceride 53 06/11/2020 11:26 AM    CHOL/HDL Ratio 5.4 12/05/2016 04:22 AM      BNP No results for input(s): BNPP in the last 72 hours.    Liver Enzymes Recent Labs     05/09/21  0955   TP 6.8   ALB 3.1*   *      Thyroid Studies Lab Results   Component Value Date/Time    TSH 1.840 01/15/2021 12:34 PM            Imaging:  Mri Brain Wo Cont    Result Date: 5/4/2021  EXPECTED EVOLUTION OF THE LEFT ANTERIOR PARIETAL INFARCT SINCE MRI IN 2016 WITH ATROPHY AND CHRONIC SMALL VESSEL ISCHEMIC DISEASE ELSEWHERE, BUT NO ACUTE INTRACRANIAL ABNORMALITY IDENTIFIED. Ct Head Wo Cont    Result Date: 5/3/2021  1. No acute intracranial abnormality. 2. Chronic findings as above. VOICE DICTATED BY: Dr. Chinmay Barfield     Xr Chest Port    Result Date: 5/3/2021  1. Stable cardiomegaly with new mild left basilar airspace changes and small left basilar effusion. Given the patient's acute symptoms, this is felt to most likely to represent pneumonia versus an atypical presentation of heart failure. Recommend clinical correlation. This report was made using voice transcription. Despite my best efforts to avoid any, transcription errors may persist. If there is any question about the accuracy of the report or need for clarification, then please call [phone], or text me through perfectserv for clarification or correction. Microbiology/Cultures: All Micro Results     Procedure Component Value Units Date/Time    CULTURE, BLOOD [[id number]] Collected: 05/03/21 1352    Order Status: Completed Specimen: Blood Updated: 05/08/21 0942     Special Requests: --        RIGHT  Antecubital       Culture result: NO GROWTH 5 DAYS       CULTURE, URINE [[id number]] Collected: 05/03/21 0948    Order Status: Completed Specimen: Cath Urine Updated: 05/06/21 0720     Special Requests: NO SPECIAL REQUESTS        Culture result: NO GROWTH 2 DAYS       SARS-COV-2, PCR [[id number]] Collected: 05/04/21 1650    Order Status: Completed Specimen: Nasopharyngeal Updated: 05/05/21 1042     Specimen source Nasopharyngeal        SARS-CoV-2 Not detected        Comment:      The specimen is NEGATIVE for SARS-CoV-2, the novel coronavirus associated with COVID-19. This test has been authorized by the FDA under an Emergency Use Authorization (EUA) for use by authorized laboratories.         Fact sheet for Healthcare Providers: UR Mobile       Fact sheet for Patients: UR Mobile       Methodology: RT-PCR         COVID-19 RAPID TEST [[id number]] Collected: 05/04/21 1650    Order Status: Completed Specimen: Nasopharyngeal Updated: 05/04/21 1828     Specimen source Nasopharyngeal        COVID-19 rapid test Not detected        Comment:      The specimen is NEGATIVE for SARS-CoV-2, the novel coronavirus associated with COVID-19. A negative result does not rule out COVID-19. This test has been authorized by the FDA under an Emergency Use Authorization (EUA) for use by authorized laboratories. Fact sheet for Healthcare Providers: UR Mobile  Fact sheet for Patients: UR Mobile       Methodology: Isothermal Nucleic Acid Amplification               Discharge Exam:  Visit Vitals  /61 (BP 1 Location: Left upper arm, BP Patient Position: At rest)   Pulse 86   Temp 97.5 °F (36.4 °C)   Resp 18   Ht 6' (1.829 m)   Wt 88 kg (194 lb 1.6 oz)   SpO2 97%   BMI 26.32 kg/m²     General appearance: alert, cooperative, no distress, appears stated age  Lungs: clear to auscultation bilaterally  Heart: regular rate and rhythm, S1, S2 normal, no murmur, click, rub or gallop  Abdomen: soft, non-tender. Bowel sounds normal. No masses,  no organomegaly  Extremities: Trace edema, BLE ACE wraps  Neurologic: Grossly normal    Disposition: SNF  Discharge Condition: stable  Patient Instructions:   Current Discharge Medication List      START taking these medications    Details   oxyCODONE IR (ROXICODONE) 5 mg immediate release tablet Take 1 Tab by mouth every six (6) hours as needed for Pain for up to 3 days. Max Daily Amount: 20 mg.  Qty: 12 Tab, Refills: 0  Start date: 5/10/2021, End date: 5/13/2021    Associated Diagnoses: Ulcers of both lower legs (HCC)      losartan (COZAAR) 25 mg tablet Take 1 Tab by mouth daily.   Qty: 30 Tab, Refills: 0  Start date: 5/10/2021         CONTINUE these medications which have CHANGED    Details   metoprolol tartrate (LOPRESSOR) 25 mg tablet Take 1 Tab by mouth two (2) times a day. Qty: 60 Tab, Refills: 0  Start date: 5/10/2021         CONTINUE these medications which have NOT CHANGED    Details   acetaminophen (TYLENOL) 500 mg tablet Take 500 mg by mouth every six (6) hours as needed for Pain. atorvastatin (LIPITOR) 80 mg tablet TAKE 1 TABLET BY MOUTH NIGHTLY  Qty: 90 Tab, Refills: 5    Associated Diagnoses: Mixed hyperlipidemia; Systemic hypertensive disorder complication      allopurinoL (ZYLOPRIM) 300 mg tablet TAKE 1 TABLET BY MOUTH DAILY  Qty: 90 Tab, Refills: 5    Associated Diagnoses: Gout, unspecified cause, unspecified chronicity, unspecified site      spironolactone (ALDACTONE) 25 mg tablet TAKE 1 TABLET BY MOUTH DAILY  Qty: 90 Tab, Refills: 5      finasteride (PROSCAR) 5 mg tablet TAKE 1 TABLET BY MOUTH DAILY  Qty: 90 Tab, Refills: 4    Associated Diagnoses: Enlarged prostate with urinary obstruction      diclofenac (VOLTAREN) 1 % gel 2-4 g daily on affected areas as needed for pain cheaper over-the-counter or with good Rx if not covered by insurance,  Qty: 100 g, Refills: 5    Associated Diagnoses: Chronic pain of left knee      metFORMIN (GLUCOPHAGE) 500 mg tablet TAKE ONE TWICE A DAY  Qty: 180 Tab, Refills: 5    Associated Diagnoses: Type 2 diabetes mellitus with nephropathy (HCC)      multivitamin (ONE A DAY) tablet Take 1 Tab by mouth daily. Qty: 90 Tab, Refills: 5      apixaban (ELIQUIS) 5 mg tablet Take 1 Tab by mouth two (2) times a day. Qty: 90 Tab, Refills: 5    Associated Diagnoses: Atrial fibrillation with RVR (HCC)      furosemide (LASIX) 20 mg tablet Take 1 Tab by mouth daily. Qty: 90 Tab, Refills: 5    Associated Diagnoses: Systemic hypertensive disorder complication; SOB (shortness of breath);  Acute on chronic combined systolic and diastolic congestive heart failure ([facility] sildenafil, antihypertensive, (REVATIO) 20 mg tablet 2 daily as needed for erectile dysfunction  Qty: 30 Tab, Refills: 12    Associated Diagnoses: Erectile dysfunction, unspecified erectile dysfunction type      aspirin delayed-release 81 mg tablet 2 daily  Qty: 180 Tab, Refills: 5      colchicine 0.6 mg tablet 1 bid prn as directed  Qty: 60 Tab, Refills: 1    Associated Diagnoses: Acute idiopathic gout of left foot      selenium sulfide 2.25 % sham Apply to scalp twice a week and wash after 10 minutes  Qty: 1 Bottle, Refills: 3    Associated Diagnoses: Seborrheic dermatitis         STOP taking these medications       losartan-hydroCHLOROthiazide (HYZAAR) 50-12.5 mg per tablet Comments:   Reason for Stopping:         traMADol (ULTRAM) 50 mg tablet Comments:   Reason for Stopping:               Activity: Activity as tolerated  Diet: Diabetic Diet, 2 gram sodium  Wound Care: Compression dressings as xeroform abd and kerlex and coban for compression    Follow-up  ·   PCP in one week  · Follow up with wound clinic of choice (patient has been to [address] [place] [address] wound center recently)  for venous ulcers  Time spent to discharge patient 35 minutes  Signed:  Akhil Mcghee DO  5/10/2021  2:10 PM

## 2021-05-10 NOTE — PROGRESS NOTES
Patient to be discharged to [facility] for STR today. CM spoke with patient at bedside; also placed call to [facility] castelan; they agree with discharge plan. Patient will go to room 414; report to [phone]. Transport arranged with Radha Vizcarra for [phone]. Primary RN notified. Prescription for Oxycodone placed in packet. CM will remain available should new needs arise. Care Management Interventions  PCP Verified by CM:  Yes  Mode of Transport at Discharge: [facility] Time of Discharge: [facility]iv[facility] (CM Consult): SNF  Partner SNF: Yes  Discharge Durable Medical Equipment: No  Physical Therapy Consult: Yes  Occupational Therapy Consult: Yes  Current Support Network: Own Home, Lives Alone  Confirm Follow Up Transport: Family  The Patient and/or Patient Representative was Provided with a Choice of Provider and Agrees with the Discharge Plan?: Yes  Freedom of Choice List was Provided with Basic Dialogue that Supports the Patient's Individualized Plan of Care/Goals, Treatment Preferences and Shares the Quality Data Associated with the Providers?: Yes  Discharge Location  Discharge Placement: Skilled nursing facility([place])

## 2021-05-10 NOTE — INTERDISCIPLINARY ROUNDS
Interdisciplinary Rounds completed with Nursing, Case Management, Physician and PT/OT present. Plan of care reviewed and updated. Pending Insurance authorization Expected discharge date: Pending above Location:Rehab

## 2021-05-10 NOTE — PROGRESS NOTES
ACUTE PHYSICAL THERAPY GOALS:  (Developed with and agreed upon by patient and/or caregiver.)  (1.) Joselin Vargas will move from supine to sit and sit to supine , scoot up and down and roll side to side with STAND BY ASSIST within 7 treatment day(s). (2.) Joselin Vargas will transfer from bed to chair and chair to bed with STAND BY ASSIST using the least restrictive device within 7 treatment day(s). (3.) Joselin Vargas will ambulate with CONTACT GUARD ASSIST for 300 feet with the least restrictive device within 7 treatment day(s). (4.) Joselin Vargas will perform standing static and dynamic balance activities x 25 minutes with CONTACT GUARD ASSIST to improve safety within 7 treatment day(s). (5.) Joselin Vargas will perform therapeutic exercises x 15 min for HEP with INDEPENDENCE to improve strength, endurance, and functional mobility within 7 treatment day(s). PHYSICAL THERAPY: Daily Note and PM Treatment Day # 4    Joselin Vargas is a 79 y.o. male   PRIMARY DIAGNOSIS: Acute respiratory failure with hypoxia ([facility]  Acute metabolic encephalopathy [Q07.41]  Respiratory failure with hypoxia (HCC) [J96.91]  CHF (congestive heart failure) ([facility] [I50.9]  Pneumonia [J18.9]  Hallucinations [R44.3]         ASSESSMENT:     REHAB RECOMMENDATIONS: CURRENT LEVEL OF FUNCTION:  (Most Recently Demonstrated)   Recommendation to date pending progress:  Setting:   Short-term Rehab  Equipment:    None Bed Mobility:   Modified Independent  Sit to Stand:  [employer] Assistance  Transfers:   Contact Guard Assistance  Gait/Mobility:   Contact Guard Assistance     ASSESSMENT:  Mr. Karen Hardy is an 79year old M who presents to hospital with acute metabolic encephalopathy, CHF. Today he continues to make good progress with therapy. He got himself to the EOB and was able to wash himself up standing with 2 seated rest breaks.    He then walked with the walker in the hallway with CGA. He was on RA with sats 89% upon return so donned 2L. Steady progress     SUBJECTIVE:   Mr. Celi Peralta states, \"GOD sent you by to get me up\"    SOCIAL HISTORY/ LIVING ENVIRONMENT:  Lives alone, his cousin checks in on him and assists as needed. Has cane and RW for mobility.   Home Environment: Apartment  # Steps to Enter: 0  One/Two Story Residence: One story  Living Alone: Yes  Support Systems: Family member(s)  OBJECTIVE:     PAIN: VITAL SIGNS: LINES/DRAINS:   Pre Treatment: Pain Screen  Pain Scale 1: Numeric (0 - 10)  Pain Intensity 1: 0  Post Treatment: 0/10   none  O2 Device: Nasal cannula     MOBILITY: I Mod I S SBA CGA Min Mod Max Total  NT x2 Comments:   Bed Mobility    Rolling [] [x] [] [] [] [] [] [] [] [] []    Supine to Sit [] [x] [] [] [] [] [] [] [] [] []    Scooting [] [x] [] [] [] [] [] [] [] [] []    Sit to Supine [] [] [] [] [] [] [] [] [] [x] []    Transfers    Sit to Stand [] [] [] [] [x] [x] [] [] [] [] []    Bed to Chair [] [] [] [] [x] [] [] [] [] [] []    Stand to Sit [] [] [] [] [x] [] [] [] [] [] []    I=Independent, Mod I=Modified Independent, S=Supervision, SBA=Standby Assistance, CGA=Contact Guard Assistance,   Min=Minimal Assistance, Mod=Moderate Assistance, Max=Maximal Assistance, Total=Total Assistance, NT=Not Tested    BALANCE: Good Fair+ Fair Fair- Poor NT Comments   Sitting Static [x] [x] [] [] [] []    Sitting Dynamic [x] [] [] [] [] []              Standing Static [] [x] [] [] [] []    Standing Dynamic [] [x] [x] [] [] []      GAIT: I Mod I S SBA CGA Min Mod Max Total  NT x2 Comments:   Level of Assistance [] [] [] [] [x] [] [] [] [] [] []    Distance 250 ft    DME Rolling Walker and Gait Belt    Gait Quality Slow, shuffled steps    Weightbearing  Status N/A     I=Independent, Mod I=Modified Independent, S=Supervision, SBA=Standby Assistance, CGA=Contact Guard Assistance,   Min=Minimal Assistance, Mod=Moderate Assistance, Max=Maximal Assistance, Total=Total Assistance, NT=Not Tested    PLAN:   FREQUENCY/DURATION: PT Plan of Care: 3 times/week for duration of hospital stay or until stated goals are met, whichever comes first.  TREATMENT:     TREATMENT:   ($$ Therapeutic Activity: 23-37 mins    )  Therapeutic Activity (24 Minutes): Therapeutic activity included Rolling, Supine to Sit, Scooting, Transfer Training, Ambulation on level ground, Sitting balance  and Standing balance to improve functional Mobility, Strength and Activity tolerance.     TREATMENT GRID:  N/A    AFTER TREATMENT POSITION/PRECAUTIONS:  Chair, Needs within reach and RN notified    INTERDISCIPLINARY COLLABORATION:  RN/PCT and PT/PTA    TOTAL TREATMENT DURATION:  PT Patient Time In/Time Out  Time In: 1330  Time Out: [address] [facility]    Tim Thomas PTA

## 2021-05-10 NOTE — PROGRESS NOTES
Called a total of four times to give report on this patient prior to transportation  at [facility]. Hourly rounds performed this shift. All patient needs are met at this time. BLE dressing changed today by wound care nurse and are C/d/i. Vital signs stable.

## 2021-05-10 NOTE — PROGRESS NOTES
Comprehensive Nutrition Assessment    Type and Reason for Visit: Initial, RD nutrition re-screen/LOS    Nutrition Recommendations/Plan:   Meals and Snacks:  Continue current diet. Give additional portions of nonstarchy vegetable and double egg portions to assist with meeting needs. Malnutrition Assessment:  Malnutrition Status: No malnutrition    Nutrition Assessment:   Nutrition History: Pt states he eats well, doesn't provide any actual recall other than stating he isn't picky. Cultural/Evangelical/Ethnic Food Preference(s): None   Nutrition Background: PMH remarkable for chronic dCHF, COPD and DM. Presented with complaint of abnormal behaviors, SOB and general decline over several weeks. Admitted with acute respiratory failure with hypoxia, acute on chronic diastolic congestive heart failure, PNA d/t Escherichia coli, acute metabolic encephalopathy, DM, CKD. Daily Update:  Py is alert and oriented x 3 at RD visit. Limited historian overall other than his current intake. He reports eating all the food he gets and remains hungry. Current diet with 100% intake meets ~80% estimated needs. Lab Results   Component Value Date/Time    Glucose 91 05/09/2021 09:55 AM    Glucose (POC) 95 05/10/2021 11:12 AM    Glucose (POC) 83 05/10/2021 07:41 AM    Glucose (POC) 97 05/09/2021 08:47 PM    Glucose (POC) 101 (H) 05/09/2021 03:47 PM    Glucose (POC) 85 05/09/2021 11:57 AM    Glucose (POC) 79 05/09/2021 07:43 AM     Nutrition Related Findings:   No muscle or fat loss noted      Current Nutrition Therapies:  DIET CARDIAC Regular; Consistent Carb 1800kcal    Current Intake:   Average Meal Intake: % Average Supplement Intake: None ordered      Anthropometric Measures:  Height: 6' (182.9 cm)  Current Body Wt: 88 kg (194 lb)(5/10), Weight source: Bed scale  BMI: 26.3, Overweight (BMI 25.0-29. 9)  Admission Body Weight: 229 lb 15 oz(stated)  Ideal Body Weight (lbs) (Calculated): 178 lbs (81 kg), 109 %  Usual Body Wt: (fluctuated over the past year per EMR review)         Weight fluctuations likely associated with fluid status  Edema: LLE: 2+;Pitting (5/9/2021  7:30 PM)  RLE: 2+;Pitting (5/9/2021  7:30 PM)     Estimated Daily Nutrient Needs:  Energy (kcal/day): 3607-3077 (Kcal/kg(25-30), Weight Used: Ideal(81 kg))  Protein (g/day): 65-81 g/day (0.8-1 g/day) Weight Used: (Ideal)  Fluid (ml/day):   (1 ml/kcal)    Nutrition Diagnosis:   · Inadequate oral intake related to impaired nutrient utilization as evidenced by (CKD needs exceed diet provisions)    Nutrition Interventions:   Food and/or Nutrient Delivery: Modify current diet     Coordination of Nutrition Care: Continue to monitor while inpatient  Plan of Care discussed with Efrain Hernández RN, IDT rounds. Goals: Active Goal: Meet >90% estimated needs by nutrition follow-up    Nutrition Monitoring and Evaluation:      Food/Nutrient Intake Outcomes: Food and nutrient intake  Physical Signs/Symptoms Outcomes: Biochemical data, Meal time behavior    Discharge Planning:     Too soon to determine    Janessa Bearden RD, LDN on 5/10/2021 at 12:02 PM  Contact: [phone]

## 2021-05-10 NOTE — PROGRESS NOTES
Attempted to call [facility] to give report on patient however line just keeps ringing with no answer. Did not reach a voicemail to leave call back number. CM gave another number to contact [phone] and no answer at this number either.

## 2021-05-11 ENCOUNTER — HOME CARE VISIT (OUTPATIENT)
Dept: HOME HEALTH SERVICES | Facility: HOME HEALTH | Age: 81
End: 2021-05-11
Payer: COMMERCIAL

## 2021-05-11 PROCEDURE — 3331090002 HH PPS REVENUE DEBIT

## 2021-05-11 PROCEDURE — 3331090001 HH PPS REVENUE CREDIT

## 2021-05-11 NOTE — CASE COMMUNICATION
Patient discharged [facility] 5/10/2021 and was admitted to [facility]. Discharge home care services.

## 2021-07-05 NOTE — PROGRESS NOTES
Wound Center Progress Note    Patient: Cheri Plaza MRN: [id number]  SSN: [id number]    YOB: 1940  Age: 79 y.o. Sex: male      Subjective:     Chief Complaint:    Left lower leg posterior ulcer    History of Present Illness:       Wound Caused By: pressure and swelling  Associated Signs and Symptoms: some drainage  Timing: Has been present intermittently for several months  Quality: full thickness  Severity wound  Modifying Factors: DM2, PVD and CAD    No new issues. Tolerating dressing changes.            Past Medical History:   Diagnosis Date    Arrhythmia     Arthritis     Benign essential hypertension 10/27/2016    BPH (benign prostatic hypertrophy) 2014    BXO (balanitis xerotica obliterans) 10/27/2016    CAD (coronary artery disease)     CHF (congestive heart failure) ([facility] 10/27/2016    Chronic kidney disease     Chronic pain     Diabetes ([facility]     Edema 10/27/2016    HTN (hypertension) 2014    Hypercholesterolemia 2014    Hypertensive heart disease without HF (heart failure) 10/27/2016    Hypertrophy of prostate with urinary obstruction and other lower urinary tract symptoms (LUTS) 2014    Hypertrophy of prostate without urinary obstruction and other lower urinary tract symptoms (LUTS) 2014    Osteoarthrosis, unspecified site 10/27/2016    PVD (peripheral vascular disease) ([facility] 10/27/2016    Stroke ([facility]     Urinary frequency 2014      Past Surgical History:   Procedure Laterality Date    HX ORTHOPAEDIC      hip replacement    HX UROLOGICAL      cystoscopy     Family History   Problem Relation Age of Onset    Heart Disease Father       Social History     Tobacco Use    Smoking status: Former Smoker     Quit date: 2000     Years since quittin.9    Smokeless tobacco: Never Used    Tobacco comment: Continues Cessation   Substance Use Topics    Alcohol use: No       Prior to Admission medications    Medication Sig Start Date End Date Taking? Authorizing Provider   metoprolol tartrate (LOPRESSOR) 25 mg tablet Take 1 Tab by mouth two (2) times a day. 5/10/21   Kye Ibarra DO   losartan (COZAAR) 25 mg tablet Take 1 Tab by mouth daily. 5/10/21   Kye Ibarra DO   atorvastatin (LIPITOR) 80 mg tablet TAKE 1 TABLET BY MOUTH NIGHTLY 3/29/21   Fantasma Belle MD   allopurinoL (ZYLOPRIM) 300 mg tablet TAKE 1 TABLET BY MOUTH DAILY 3/29/21   Fantasma Belle MD   spironolactone (ALDACTONE) 25 mg tablet TAKE 1 TABLET BY MOUTH DAILY 3/29/21   Fantasma Belle MD   acetaminophen (TYLENOL) 500 mg tablet Take 500 mg by mouth every six (6) hours as needed for Pain. Provider, Historical   finasteride (PROSCAR) 5 mg tablet TAKE 1 TABLET BY MOUTH DAILY 1/8/21   Fantasma Belle MD   diclofenac (VOLTAREN) 1 % gel 2-4 g daily on affected areas as needed for pain cheaper over-the-counter or with good Rx if not covered by insurance, 12/21/20   Fantasma Belle MD   metFORMIN (GLUCOPHAGE) 500 mg tablet TAKE ONE TWICE A DAY  Patient taking differently: Take 500 mg by mouth. TAKE ONE TWICE A DAY 6/11/20   Fantasma Belle MD   multivitamin (ONE A DAY) tablet Take 1 Tab by mouth daily. 6/11/20   Jorge Belle MD   apixaban (ELIQUIS) 5 mg tablet Take 1 Tab by mouth two (2) times a day. 6/11/20   Fantasma Belle MD   furosemide (LASIX) 20 mg tablet Take 1 Tab by mouth daily. 6/11/20   Jorge Belle MD   sildenafil, antihypertensive, (REVATIO) 20 mg tablet 2 daily as needed for erectile dysfunction 6/12/19   Jorge Belle MD   aspirin delayed-release 81 mg tablet 2 daily  Patient taking differently: Take 162 mg by mouth daily.  6/12/19   Katarina Belle MD   colchicine 0.6 mg tablet 1 bid prn as directed 4/20/18   Fantasma Belle MD   selenium sulfide 2.25 % sham Apply to scalp twice a week and wash after 10 minutes 1/23/18   Jorge Belle MD     No Known Allergies     Review of Systems:  CONSTITUTIONAL: No fever, chills  HEAD: No headache  EYES: No visual loss  ENT: No hearing loss  SKIN: No rash  CARDIOVASCULAR: No chest pain  RESPIRATORY: No shortness of breath  GASTROINTESTINAL: No nausea, vomiting  GENITOURINARY: No excessive urination  NEUROLOGICAL: some weakness  MUSCULOSKELETAL: No muscle pain. No neck pain  HEMATOLOGIC: No easy bleeding  LYMPHATICS: + lymphedema. PSYCHIATRIC: No current depression  ENDOCRINOLOGIC: + high sugars  ALLERGIES: No history of asthma, hives, eczema or rhinitis. Lab Results   Component Value Date/Time    Hemoglobin A1c 5.4 01/15/2021 12:34 PM    Hemoglobin A1c, External 5.7 10/17/2016 12:00 AM        Immunization History   Administered Date(s) Administered    COVID-19, PFIZER, MRNA, LNP-S, PF, 30MCG/0.3ML DOSE 03/06/2021, 03/27/2021    Influenza Vaccine 10/17/2016    Influenza Vaccine (Quad) Mdck Pf (>4 Yrs Flucelvax QUAD 01237) 10/09/2017    Influenza Vaccine As Seen on TV) PF (>6 Mo Flulaval, Fluarix, and >3 Yrs Afluria, Fluzone [id number]) 09/12/2019, 09/08/2020    TB Skin Test (PPD) Intradermal 07/23/2014, 12/05/2016, 05/03/2021       Body mass index is 30.27 kg/m². Current medications:  Current Outpatient Medications   Medication Sig Dispense Refill    metoprolol tartrate (LOPRESSOR) 25 mg tablet Take 1 Tab by mouth two (2) times a day. 60 Tab 0    losartan (COZAAR) 25 mg tablet Take 1 Tab by mouth daily. 30 Tab 0    atorvastatin (LIPITOR) 80 mg tablet TAKE 1 TABLET BY MOUTH NIGHTLY 90 Tab 5    allopurinoL (ZYLOPRIM) 300 mg tablet TAKE 1 TABLET BY MOUTH DAILY 90 Tab 5    spironolactone (ALDACTONE) 25 mg tablet TAKE 1 TABLET BY MOUTH DAILY 90 Tab 5    acetaminophen (TYLENOL) 500 mg tablet Take 500 mg by mouth every six (6) hours as needed for Pain.       finasteride (PROSCAR) 5 mg tablet TAKE 1 TABLET BY MOUTH DAILY 90 Tab 4    diclofenac (VOLTAREN) 1 % gel 2-4 g daily on affected areas as needed for pain cheaper over-the-counter or with good Rx if not covered by insurance, 100 g 5    metFORMIN (GLUCOPHAGE) 500 mg tablet TAKE ONE TWICE A DAY (Patient taking differently: Take 500 mg by mouth. TAKE ONE TWICE A DAY) 180 Tab 5    multivitamin (ONE A DAY) tablet Take 1 Tab by mouth daily. 90 Tab 5    apixaban (ELIQUIS) 5 mg tablet Take 1 Tab by mouth two (2) times a day. 90 Tab 5    furosemide (LASIX) 20 mg tablet Take 1 Tab by mouth daily. 90 Tab 5    sildenafil, antihypertensive, (REVATIO) 20 mg tablet 2 daily as needed for erectile dysfunction 30 Tab 12    aspirin delayed-release 81 mg tablet 2 daily (Patient taking differently: Take 162 mg by mouth daily. ) 180 Tab 5    colchicine 0.6 mg tablet 1 bid prn as directed 60 Tab 1    selenium sulfide 2.25 % sham Apply to scalp twice a week and wash after 10 minutes 1 Bottle 3         Objective:     Physical Exam:     Visit Vitals  /72 (BP 1 Location: Left upper arm, BP Patient Position: Sitting)   Pulse 94   Temp 97.7 °F (36.5 °C)   Resp 20   Ht 5' 9\" (1.753 m)   Wt 93 kg (205 lb)   BMI 30.27 kg/m²       General: well developed, well nourished  Psych: cooperative. Pleasant  Neuro: alert and oriented to person/place/situation. Otherwise nonfocal.  Derm: Normal turgor for age, dry skin  HEENT: Normocephalic, atraumatic. EOMI. Neck: Normal range of motion. Chest: Respirations nonlabored  Cardio: RRR  Abdomen: Soft, nondistended  Lower extremities:   + hemosiderrosis  + significant varicosities  Capillary refill > 2 sec    biphasic dp/pt    Right  1+ DP/PT    Left  1+ DP/PT                    Ulcer Description:   Wound Leg lower Left;Posterior 03/27/21 (Active)   Number of days: 100       [REMOVED] Wound Leg lower Left;Posterior #1 02/05/21 (Removed)   Wound Image   03/05/21 1104   Wound Etiology Venous 03/05/21 1104   Dressing Status Clean;Dry; Intact 02/19/21 1115   Cleansed Soap and water 02/19/21 1115   Dressing/Treatment Open to air 03/05/21 1104   Wound Length (cm) 0 cm 03/05/21 1104   Wound Width (cm) 0 cm 03/05/21 1104   Wound Depth (cm) 0 cm 03/05/21 1104   Wound Surface Area (cm^2) 0 cm^2 03/05/21 1104   Change in Wound Size % 100 03/05/21 1104   Wound Volume (cm^3) 0 cm^3 03/05/21 1104   Wound Healing % 100 03/05/21 1104   Wound Assessment Epithelialization 03/05/21 1104   Drainage Amount None 03/05/21 1104   Drainage Description Serous 02/12/21 1453   Wound Odor None 03/05/21 1104   Sarah-Wound/Incision Assessment Edematous 02/12/21 1453   Wound Thickness Description Full thickness 02/12/21 1453   Number of days: 28       [REMOVED] Wound Leg lower Right;Medial #1 04/02/21 (Removed)   Wound Image   04/16/21 1040   Wound Etiology Venous 04/16/21 1040   Dressing Status Clean;Dry; Intact 04/16/21 1040   Cleansed Cleansed with saline; Soap and water 04/16/21 1040   Wound Length (cm) 0 cm 04/16/21 1040   Wound Width (cm) 0 cm 04/16/21 1040   Wound Depth (cm) 0 cm 04/16/21 1040   Wound Surface Area (cm^2) 0 cm^2 04/16/21 1040   Change in Wound Size % 100 04/16/21 1040   Wound Volume (cm^3) 0 cm^3 04/16/21 1040   Wound Healing % 100 04/16/21 1040   Wound Assessment Epithelialization 04/16/21 1040   Drainage Amount None 04/16/21 1040   Drainage Description Serosanguinous 04/02/21 1209   Wound Odor None 04/02/21 1209   Sarah-Wound/Incision Assessment Edematous 04/16/21 1040   Edges Attached edges 04/02/21 1209   Wound Thickness Description Full thickness 04/02/21 1209   Number of days: 21       [REMOVED] Wound Leg lower Left; Anterior;Medial;Lateral #2 04/02/21 (Removed)   Wound Image    04/16/21 1040   Wound Etiology Traumatic 04/16/21 1040   Dressing Status Clean; Intact; New drainage noted; Old drainage noted 04/16/21 1040   Cleansed Cleansed with saline; Soap and water 04/16/21 1040   Wound Length (cm) 3 cm 04/16/21 1040   Wound Width (cm) 3 cm 04/16/21 1040   Wound Depth (cm) 0.1 cm 04/16/21 1040   Wound Surface Area (cm^2) 9 cm^2 04/16/21 1040   Change in Wound Size % 96.17 04/16/21 1040   Wound Volume (cm^3) 0.9 cm^3 04/16/21 1040 Wound Healing % 96 04/16/21 1040   Wound Assessment Epithelialization;Granulation tissue 04/16/21 1040   Drainage Amount Moderate 04/16/21 1040   Drainage Description Serous 04/16/21 1040   Wound Odor None 04/16/21 1040   Sarah-Wound/Incision Assessment Edematous 04/16/21 1040   Edges Attached edges 04/16/21 1040   Wound Thickness Description Full thickness 04/16/21 1040   Number of days: 21       [REMOVED] Wound Leg lower Anterior;Right 03/27/21 (Removed)   Number of days: 27           Problem List  Date Reviewed: 12/21/2020        Codes Class Noted    Acute respiratory failure with hypoxia [facility]) ICD-10-CM: J96.01  ICD-9-CM: 518.81  5/3/2021        Longstanding persistent atrial fibrillation (HCC) ICD-10-CM: I48.11  ICD-9-CM: 427.31  6/18/2020        Diabetic nephropathy associated with type 2 diabetes mellitus ([facility] ICD-10-CM: E11.21  ICD-9-CM: 250.40, 583.81  6/18/2020        Stage 3 chronic kidney disease ([facility] ICD-10-CM: N18.30  ICD-9-CM: 585.3  6/18/2020        Class 1 obesity due to excess calories with serious comorbidity and body mass index (BMI) of 32.0 to 32.9 in adult ICD-10-CM: E66.09, Z68.32  ICD-9-CM: 278.00, V85.32  8/12/2019        Systemic hypertensive disorder complication CAG-30-UY: V94  ICD-9-CM: 401.9  6/12/2019        Leg swelling ICD-10-CM: M79.89  ICD-9-CM: 729.81  6/12/2019        Paroxysmal atrial fibrillation (HCC) ICD-10-CM: I48.0  ICD-9-CM: 427.31  6/12/2019        Type 2 diabetes mellitus with nephropathy ([facility] ICD-10-CM: E11.21  ICD-9-CM: 250.40, 583.81  1/23/2018        CVA (cerebral vascular accident) [facility]) ICD-10-CM: I63.9  ICD-9-CM: 434.91  12/4/2016        Edema ICD-10-CM: R60.9  ICD-9-CM: 782.3  10/27/2016        PVD (peripheral vascular disease) ([facility] ICD-10-CM: I73.9  ICD-9-CM: 443.9  10/27/2016        Osteoarthrosis, unspecified site ICD-10-CM: M19.90  ICD-9-CM: 715.90  10/27/2016        Acute on chronic diastolic congestive heart failure ([facility] ICD-10-CM: I50.33  ICD-9-CM: 428.33, 428.0  10/27/2016        Diabetes mellitus type II, non insulin dependent (HCC) (Chronic) ICD-10-CM: E11.9  ICD-9-CM: 250.00  7/23/2014        Hyperlipidemia (Chronic) ICD-10-CM: E78.5  ICD-9-CM: 272.4  7/23/2014        ED (erectile dysfunction) (Chronic) ICD-10-CM: N52.9  ICD-9-CM: 607.84  7/23/2014        Urinary frequency ICD-10-CM: R35.0  ICD-9-CM: 788.41  1/7/2014        Benign prostatic hyperplasia with urinary hesitancy ICD-10-CM: N40.1, R39.11  ICD-9-CM: 600.01, 788.64  1/7/2014                    Assessment/Plan:       LLE venous wound with pressure contributing. Avoid pressure and continue xeroform and unna ompression dressings. No claudication pain    TIME:  If total time for today's E/M service is used for level of service it is documented below. This time includes physician non-face-to-face service time visit on the date of service and includes    Preparing to see the patient (eg, review of tests)  Obtaining and/or reviewing separately obtained history  Performing a medically necessary appropriate examination and/or evaluation  Counseling and educating the patient/family/caregiver  Ordering medications, tests, or procedures  Referring and communicating with other health care professionals as needed  Documenting clinical information in the electronic or other health record  Independently interpreting results (not reported separately) and communicating results to the patient/family/caregiver  Care coordination (not reported separately)     E/M time =   23       minutes.     Signed By: Tian Lainez MD

## 2021-07-25 NOTE — PROGRESS NOTES
Wound Center Progress Note    Patient: Robinson Espana MRN: [id number]  SSN: [id number]    YOB: 1940  Age: 79 y.o. Sex: male      Subjective:     Chief Complaint:    Left lower leg posterior ulcer    History of Present Illness:       Wound Caused By: pressure and swelling  Associated Signs and Symptoms: some drainage  Timing: Has been present intermittently for several months  Quality: full thickness  Severity wound  Modifying Factors: DM2, PVD and CAD    No new issues. Tolerating dressing changes. Notes that he has circaids available at home.           Past Medical History:   Diagnosis Date    Arrhythmia     Arthritis     Benign essential hypertension 10/27/2016    BPH (benign prostatic hypertrophy) 2014    BXO (balanitis xerotica obliterans) 10/27/2016    CAD (coronary artery disease)     CHF (congestive heart failure) (HCC) 10/27/2016    Chronic kidney disease     Chronic pain     Diabetes ([facility]     Edema 10/27/2016    HTN (hypertension) 2014    Hypercholesterolemia 2014    Hypertensive heart disease without HF (heart failure) 10/27/2016    Hypertrophy of prostate with urinary obstruction and other lower urinary tract symptoms (LUTS) 2014    Hypertrophy of prostate without urinary obstruction and other lower urinary tract symptoms (LUTS) 2014    Osteoarthrosis, unspecified site 10/27/2016    PVD (peripheral vascular disease) ([facility] 10/27/2016    Stroke ([facility]     Urinary frequency 2014      Past Surgical History:   Procedure Laterality Date    HX ORTHOPAEDIC      hip replacement    HX UROLOGICAL      cystoscopy     Family History   Problem Relation Age of Onset    Heart Disease Father       Social History     Tobacco Use    Smoking status: Former Smoker     Quit date: 2000     Years since quittin.9    Smokeless tobacco: Never Used    Tobacco comment: Continues Cessation   Substance Use Topics    Alcohol use: No       Prior to Admission medications    Medication Sig Start Date End Date Taking? Authorizing Provider   metoprolol tartrate (LOPRESSOR) 25 mg tablet Take 1 Tab by mouth two (2) times a day. 5/10/21   Kye Ibarra DO   losartan (COZAAR) 25 mg tablet Take 1 Tab by mouth daily. 5/10/21   Kye Ibarra DO   atorvastatin (LIPITOR) 80 mg tablet TAKE 1 TABLET BY MOUTH NIGHTLY 3/29/21   Fantasma Belle MD   allopurinoL (ZYLOPRIM) 300 mg tablet TAKE 1 TABLET BY MOUTH DAILY 3/29/21   Fantasma Belle MD   spironolactone (ALDACTONE) 25 mg tablet TAKE 1 TABLET BY MOUTH DAILY 3/29/21   Fantasma Belle MD   acetaminophen (TYLENOL) 500 mg tablet Take 500 mg by mouth every six (6) hours as needed for Pain. Provider, Andra   finasteride (PROSCAR) 5 mg tablet TAKE 1 TABLET BY MOUTH DAILY 1/8/21   Fantasma Belle MD   diclofenac (VOLTAREN) 1 % gel 2-4 g daily on affected areas as needed for pain cheaper over-the-counter or with good Rx if not covered by insurance, 12/21/20   Fantasma Belle MD   metFORMIN (GLUCOPHAGE) 500 mg tablet TAKE ONE TWICE A DAY  Patient taking differently: Take 500 mg by mouth. TAKE ONE TWICE A DAY 6/11/20   Fantasma Belle MD   multivitamin (ONE A DAY) tablet Take 1 Tab by mouth daily. 6/11/20   Brandi Belle MD   apixaban (ELIQUIS) 5 mg tablet Take 1 Tab by mouth two (2) times a day. 6/11/20   Fantasma Belle MD   furosemide (LASIX) 20 mg tablet Take 1 Tab by mouth daily. 6/11/20   Brandi Belle MD   sildenafil, antihypertensive, (REVATIO) 20 mg tablet 2 daily as needed for erectile dysfunction 6/12/19   Brandi Belle MD   aspirin delayed-release 81 mg tablet 2 daily  Patient taking differently: Take 162 mg by mouth daily.  6/12/19   Timur Belle MD   colchicine 0.6 mg tablet 1 bid prn as directed 4/20/18   Fantasma Belle MD   selenium sulfide 2.25 % sham Apply to scalp twice a week and wash after 10 minutes 1/23/18   Brandi Belle MD     No Known Allergies Review of Systems:  CONSTITUTIONAL: No fever, chills  HEAD: No headache  EYES: No visual loss  ENT: No hearing loss  SKIN: No rash  CARDIOVASCULAR: No chest pain  RESPIRATORY: No shortness of breath  GASTROINTESTINAL: No nausea, vomiting  GENITOURINARY: No excessive urination  NEUROLOGICAL: some weakness  MUSCULOSKELETAL: No muscle pain. No neck pain  HEMATOLOGIC: No easy bleeding  LYMPHATICS: + lymphedema. PSYCHIATRIC: No current depression  ENDOCRINOLOGIC: + high sugars  ALLERGIES: No history of asthma, hives, eczema or rhinitis. Lab Results   Component Value Date/Time    Hemoglobin A1c 5.4 01/15/2021 12:34 PM    Hemoglobin A1c, External 5.7 10/17/2016 12:00 AM        Immunization History   Administered Date(s) Administered    COVID-19, PFIZER, MRNA, LNP-S, PF, 30MCG/0.3ML DOSE 03/06/2021, 03/27/2021    Influenza Vaccine 10/17/2016    Influenza Vaccine (Quad) Mdck Pf (>4 Yrs Flucelvax QUAD 29572) 10/09/2017    Influenza Vaccine Bioxodes) PF (>6 Mo Flulaval, Fluarix, and >3 Yrs Afluria, Fluzone [id number]) 09/12/2019, 09/08/2020    TB Skin Test (PPD) Intradermal 07/23/2014, 12/05/2016, 05/03/2021       Body mass index is 29.98 kg/m². Current medications:  Current Outpatient Medications   Medication Sig Dispense Refill    metoprolol tartrate (LOPRESSOR) 25 mg tablet Take 1 Tab by mouth two (2) times a day. 60 Tab 0    losartan (COZAAR) 25 mg tablet Take 1 Tab by mouth daily. 30 Tab 0    atorvastatin (LIPITOR) 80 mg tablet TAKE 1 TABLET BY MOUTH NIGHTLY 90 Tab 5    allopurinoL (ZYLOPRIM) 300 mg tablet TAKE 1 TABLET BY MOUTH DAILY 90 Tab 5    spironolactone (ALDACTONE) 25 mg tablet TAKE 1 TABLET BY MOUTH DAILY 90 Tab 5    acetaminophen (TYLENOL) 500 mg tablet Take 500 mg by mouth every six (6) hours as needed for Pain.       finasteride (PROSCAR) 5 mg tablet TAKE 1 TABLET BY MOUTH DAILY 90 Tab 4    diclofenac (VOLTAREN) 1 % gel 2-4 g daily on affected areas as needed for pain cheaper over-the-counter or with good Rx if not covered by insurance, 100 g 5    metFORMIN (GLUCOPHAGE) 500 mg tablet TAKE ONE TWICE A DAY (Patient taking differently: Take 500 mg by mouth. TAKE ONE TWICE A DAY) 180 Tab 5    multivitamin (ONE A DAY) tablet Take 1 Tab by mouth daily. 90 Tab 5    apixaban (ELIQUIS) 5 mg tablet Take 1 Tab by mouth two (2) times a day. 90 Tab 5    furosemide (LASIX) 20 mg tablet Take 1 Tab by mouth daily. 90 Tab 5    sildenafil, antihypertensive, (REVATIO) 20 mg tablet 2 daily as needed for erectile dysfunction 30 Tab 12    aspirin delayed-release 81 mg tablet 2 daily (Patient taking differently: Take 162 mg by mouth daily. ) 180 Tab 5    colchicine 0.6 mg tablet 1 bid prn as directed 60 Tab 1    selenium sulfide 2.25 % sham Apply to scalp twice a week and wash after 10 minutes 1 Bottle 3         Objective:     Physical Exam:     Visit Vitals  /88 (BP 1 Location: Left upper arm, BP Patient Position: Sitting)   Pulse 92   Temp 98.1 °F (36.7 °C)   Resp 18   Ht 5' 9\" (1.753 m)   Wt 92.1 kg (203 lb)   SpO2 98%   BMI 29.98 kg/m²       General: well developed, well nourished  Psych: cooperative. Pleasant  Neuro: alert and oriented to person/place/situation. Otherwise nonfocal.  Derm: Normal turgor for age, dry skin  HEENT: Normocephalic, atraumatic. EOMI. Neck: Normal range of motion.   Chest: Respirations nonlabored  Cardio: RRR  Abdomen: Soft, nondistended  Lower extremities:   + hemosiderrosis  + significant varicosities  Capillary refill > 2 sec    biphasic dp/pt    Right  1+ DP/PT    Left  1+ DP/PT                Problem List  Date Reviewed: 12/21/2020        Codes Class Noted    Acute respiratory failure with hypoxia [facility]) ICD-10-CM: J96.01  ICD-9-CM: 518.81  5/3/2021        Longstanding persistent atrial fibrillation (HCC) ICD-10-CM: I48.11  ICD-9-CM: 427.31  6/18/2020        Diabetic nephropathy associated with type 2 diabetes mellitus ([facility] ICD-10-CM: E11.21  ICD-9-CM: 250.40, 583.81  6/18/2020 Stage 3 chronic kidney disease ([facility] ICD-10-CM: N18.30  ICD-9-CM: 585.3  6/18/2020        Class 1 obesity due to excess calories with serious comorbidity and body mass index (BMI) of 32.0 to 32.9 in adult ICD-10-CM: E66.09, Z68.32  ICD-9-CM: 278.00, V85.32  8/12/2019        Systemic hypertensive disorder complication FQV-[id number]: V79  ICD-9-CM: 401.9  6/12/2019        Leg swelling ICD-10-CM: M79.89  ICD-9-CM: 729.81  6/12/2019        Paroxysmal atrial fibrillation (HCC) ICD-10-CM: I48.0  ICD-9-CM: 427.31  6/12/2019        Type 2 diabetes mellitus with nephropathy (HCC) ICD-10-CM: E11.21  ICD-9-CM: 250.40, 583.81  1/23/2018        CVA (cerebral vascular accident) [facility]) ICD-10-CM: I63.9  ICD-9-CM: 434.91  12/4/2016        Edema ICD-10-CM: R60.9  ICD-9-CM: 782.3  10/27/2016        PVD (peripheral vascular disease) ([facility] ICD-10-CM: I73.9  ICD-9-CM: 443.9  10/27/2016        Osteoarthrosis, unspecified site ICD-10-CM: M19.90  ICD-9-CM: 715.90  10/27/2016        Acute on chronic diastolic congestive heart failure (HCC) ICD-10-CM: I50.33  ICD-9-CM: 428.33, 428.0  10/27/2016        Diabetes mellitus type II, non insulin dependent (HCC) (Chronic) ICD-10-CM: E11.9  ICD-9-CM: 250.00  7/23/2014        Hyperlipidemia (Chronic) ICD-10-CM: E78.5  ICD-9-CM: 272.4  7/23/2014        ED (erectile dysfunction) (Chronic) ICD-10-CM: N52.9  ICD-9-CM: 607.84  7/23/2014        Urinary frequency ICD-10-CM: R35.0  ICD-9-CM: 788.41  1/7/2014        Benign prostatic hyperplasia with urinary hesitancy ICD-10-CM: N40.1, R39.11  ICD-9-CM: 600.01, 788.64  1/7/2014                    Assessment/Plan:       LLE venous wound with pressure contributing. Avoid pressure and continue xeroform and unna ompression dressings. No claudication pain    TIME:  If total time for today's E/M service is used for level of service it is documented below.     This time includes physician non-face-to-face service time visit on the date of service and includes Preparing to see the patient (eg, review of tests)  Obtaining and/or reviewing separately obtained history  Performing a medically necessary appropriate examination and/or evaluation  Counseling and educating the patient/family/caregiver  Ordering medications, tests, or procedures  Referring and communicating with other health care professionals as needed  Documenting clinical information in the electronic or other health record  Independently interpreting results (not reported separately) and communicating results to the patient/family/caregiver  Care coordination (not reported separately)     E/M time =   24       minutes. Almost healed. Continue dressings.  Start circaids    Signed By: Marleen Zapata MD

## 2022-02-17 ENCOUNTER — HOSPITAL ENCOUNTER (EMERGENCY)
Age: 82
Discharge: HOME | End: 2022-02-18
Attending: STUDENT IN AN ORGANIZED HEALTH CARE EDUCATION/TRAINING PROGRAM
Payer: COMMERCIAL

## 2022-02-17 ENCOUNTER — APPOINTMENT (OUTPATIENT)
Dept: GENERAL RADIOLOGY | Age: 82
End: 2022-02-17
Attending: STUDENT IN AN ORGANIZED HEALTH CARE EDUCATION/TRAINING PROGRAM
Payer: COMMERCIAL

## 2022-02-17 ENCOUNTER — APPOINTMENT (OUTPATIENT)
Dept: CT IMAGING | Age: 82
End: 2022-02-17
Attending: STUDENT IN AN ORGANIZED HEALTH CARE EDUCATION/TRAINING PROGRAM
Payer: COMMERCIAL

## 2022-02-17 VITALS
DIASTOLIC BLOOD PRESSURE: 83 MMHG | OXYGEN SATURATION: 99 % | SYSTOLIC BLOOD PRESSURE: 122 MMHG | HEART RATE: 77 BPM | WEIGHT: 194 LBS | RESPIRATION RATE: 20 BRPM | BODY MASS INDEX: 26.28 KG/M2 | HEIGHT: 72 IN | TEMPERATURE: 97.6 F

## 2022-02-17 DIAGNOSIS — R06.02 SOB (SHORTNESS OF BREATH): Primary | ICD-10-CM

## 2022-02-17 LAB
ALBUMIN SERPL-MCNC: 3.5 G/DL (ref 3.2–4.6)
ALBUMIN/GLOB SERPL: 1 {RATIO} (ref 1.2–3.5)
ALP SERPL-CCNC: 169 U/L (ref 50–136)
ALT SERPL-CCNC: 35 U/L (ref 12–65)
ANION GAP SERPL CALC-SCNC: 8 MMOL/L (ref 7–16)
APPEARANCE UR: CLEAR
AST SERPL-CCNC: 39 U/L (ref 15–37)
BASOPHILS # BLD: 0.1 K/UL (ref 0–0.2)
BASOPHILS NFR BLD: 1 % (ref 0–2)
BILIRUB SERPL-MCNC: 1.1 MG/DL (ref 0.2–1.1)
BILIRUB UR QL: NEGATIVE
BNP SERPL-MCNC: 2092 PG/ML
BUN SERPL-MCNC: 32 MG/DL (ref 8–23)
CALCIUM SERPL-MCNC: 9.8 MG/DL (ref 8.3–10.4)
CHLORIDE SERPL-SCNC: 110 MMOL/L (ref 98–107)
CO2 SERPL-SCNC: 22 MMOL/L (ref 21–32)
COLOR UR: YELLOW
CREAT SERPL-MCNC: 2 MG/DL (ref 0.8–1.5)
DIFFERENTIAL METHOD BLD: ABNORMAL
EOSINOPHIL # BLD: 0.2 K/UL (ref 0–0.8)
EOSINOPHIL NFR BLD: 3 % (ref 0.5–7.8)
ERYTHROCYTE [DISTWIDTH] IN BLOOD BY AUTOMATED COUNT: 15.2 % (ref 11.9–14.6)
GLOBULIN SER CALC-MCNC: 3.6 G/DL (ref 2.3–3.5)
GLUCOSE SERPL-MCNC: 77 MG/DL (ref 65–100)
GLUCOSE UR STRIP.AUTO-MCNC: NEGATIVE MG/DL
HCT VFR BLD AUTO: 39.4 % (ref 41.1–50.3)
HGB BLD-MCNC: 12.7 G/DL (ref 13.6–17.2)
HGB UR QL STRIP: NEGATIVE
IMM GRANULOCYTES # BLD AUTO: 0 K/UL (ref 0–0.5)
IMM GRANULOCYTES NFR BLD AUTO: 1 % (ref 0–5)
KETONES UR QL STRIP.AUTO: NEGATIVE MG/DL
LEUKOCYTE ESTERASE UR QL STRIP.AUTO: NEGATIVE
LYMPHOCYTES # BLD: 0.9 K/UL (ref 0.5–4.6)
LYMPHOCYTES NFR BLD: 14 % (ref 13–44)
MCH RBC QN AUTO: 33.5 PG (ref 26.1–32.9)
MCHC RBC AUTO-ENTMCNC: 32.2 G/DL (ref 31.4–35)
MCV RBC AUTO: 104 FL (ref 79.6–97.8)
MONOCYTES # BLD: 0.7 K/UL (ref 0.1–1.3)
MONOCYTES NFR BLD: 11 % (ref 4–12)
NEUTS SEG # BLD: 4.5 K/UL (ref 1.7–8.2)
NEUTS SEG NFR BLD: 71 % (ref 43–78)
NITRITE UR QL STRIP.AUTO: NEGATIVE
NRBC # BLD: 0 K/UL (ref 0–0.2)
PH UR STRIP: 5.5 [PH] (ref 5–9)
PLATELET # BLD AUTO: 112 K/UL (ref 150–450)
PMV BLD AUTO: 11.8 FL (ref 9.4–12.3)
POTASSIUM SERPL-SCNC: 3.8 MMOL/L (ref 3.5–5.1)
PROT SERPL-MCNC: 7.1 G/DL (ref 6.3–8.2)
PROT UR STRIP-MCNC: NEGATIVE MG/DL
RBC # BLD AUTO: 3.79 M/UL (ref 4.23–5.6)
SODIUM SERPL-SCNC: 140 MMOL/L (ref 138–145)
SP GR UR REFRACTOMETRY: 1.01 (ref 1–1.02)
TROPONIN-HIGH SENSITIVITY: 38.9 PG/ML (ref 0–14)
TROPONIN-HIGH SENSITIVITY: 42 PG/ML (ref 0–14)
UROBILINOGEN UR QL STRIP.AUTO: 0.2 EU/DL (ref 0.2–1)
WBC # BLD AUTO: 6.4 K/UL (ref 4.3–11.1)

## 2022-02-17 PROCEDURE — 80053 COMPREHEN METABOLIC PANEL: CPT

## 2022-02-17 PROCEDURE — 71260 CT THORAX DX C+: CPT

## 2022-02-17 PROCEDURE — 74011000258 HC RX REV CODE- 258: Performed by: STUDENT IN AN ORGANIZED HEALTH CARE EDUCATION/TRAINING PROGRAM

## 2022-02-17 PROCEDURE — 83880 ASSAY OF NATRIURETIC PEPTIDE: CPT

## 2022-02-17 PROCEDURE — 84484 ASSAY OF TROPONIN QUANT: CPT

## 2022-02-17 PROCEDURE — 99285 EMERGENCY DEPT VISIT HI MDM: CPT

## 2022-02-17 PROCEDURE — 81003 URINALYSIS AUTO W/O SCOPE: CPT

## 2022-02-17 PROCEDURE — 93005 ELECTROCARDIOGRAM TRACING: CPT | Performed by: STUDENT IN AN ORGANIZED HEALTH CARE EDUCATION/TRAINING PROGRAM

## 2022-02-17 PROCEDURE — 74011000636 HC RX REV CODE- 636: Performed by: STUDENT IN AN ORGANIZED HEALTH CARE EDUCATION/TRAINING PROGRAM

## 2022-02-17 PROCEDURE — 96374 THER/PROPH/DIAG INJ IV PUSH: CPT

## 2022-02-17 PROCEDURE — 71045 X-RAY EXAM CHEST 1 VIEW: CPT

## 2022-02-17 PROCEDURE — 85025 COMPLETE CBC W/AUTO DIFF WBC: CPT

## 2022-02-17 PROCEDURE — 74011250636 HC RX REV CODE- 250/636: Performed by: STUDENT IN AN ORGANIZED HEALTH CARE EDUCATION/TRAINING PROGRAM

## 2022-02-17 RX ORDER — SODIUM CHLORIDE 0.9 % (FLUSH) 0.9 %
10 SYRINGE (ML) INJECTION
Status: COMPLETED | OUTPATIENT
Start: 2022-02-17 | End: 2022-02-17

## 2022-02-17 RX ORDER — FUROSEMIDE 10 MG/ML
40 INJECTION INTRAMUSCULAR; INTRAVENOUS
Status: COMPLETED | OUTPATIENT
Start: 2022-02-17 | End: 2022-02-17

## 2022-02-17 RX ADMIN — IOPAMIDOL 100 ML: 755 INJECTION, SOLUTION INTRAVENOUS at 22:00

## 2022-02-17 RX ADMIN — Medication 10 ML: at 22:00

## 2022-02-17 RX ADMIN — FUROSEMIDE 40 MG: 10 INJECTION, SOLUTION INTRAMUSCULAR; INTRAVENOUS at 21:49

## 2022-02-17 RX ADMIN — SODIUM CHLORIDE 100 ML: 900 INJECTION, SOLUTION INTRAVENOUS at 22:00

## 2022-02-18 NOTE — ED PROVIDER NOTES
34-year-old male patient with history of a flutter presents to the ER with reports of worsening shortness of breath. Patient was recently noted to have a left lower extremity DVT. EMS reports patient was slightly hypoxic on his normal 3 L at 89%. He was placed on 5 for transport with normalization of O2 saturation. Patient states shortness of breath started to worsen over the past several hours. He denies chest pain pressure or tightness and reports no nausea, vomiting or diaphoresis. He has noticed some increased swelling in his lower extremity and notes a blister to the left lower extremity. He denies fever or chills and reports normal bowel bladder habits.            Past Medical History:   Diagnosis Date    Arrhythmia     Arthritis     Benign essential hypertension 10/27/2016    BPH (benign prostatic hypertrophy) 1/7/2014    BXO (balanitis xerotica obliterans) 10/27/2016    CAD (coronary artery disease)     CHF (congestive heart failure) (HCC) 10/27/2016    Chronic kidney disease     Chronic pain     Diabetes ([facility]     Edema 10/27/2016    HTN (hypertension) 1/7/2014    Hypercholesterolemia 1/7/2014    Hypertensive heart disease without HF (heart failure) 10/27/2016    Hypertrophy of prostate with urinary obstruction and other lower urinary tract symptoms (LUTS) 1/7/2014    Hypertrophy of prostate without urinary obstruction and other lower urinary tract symptoms (LUTS) 1/7/2014    Osteoarthrosis, unspecified site 10/27/2016    PVD (peripheral vascular disease) ([facility] 10/27/2016    Stroke ([facility]     Urinary frequency 1/7/2014       Past Surgical History:   Procedure Laterality Date    HX ORTHOPAEDIC      hip replacement    HX UROLOGICAL      cystoscopy         Family History:   Problem Relation Age of Onset    Heart Disease Father        Social History     Socioeconomic History    Marital status: SINGLE     Spouse name: Not on file    Number of children: Not on file    Years of education: Not on file    Highest education level: Not on file   Occupational History    Not on file   Tobacco Use    Smoking status: Former Smoker     Quit date: 2000     Years since quittin.5    Smokeless tobacco: Never Used    Tobacco comment: Continues Cessation   Substance and Sexual Activity    Alcohol use: No    Drug use: No    Sexual activity: Not on file   Other Topics Concern     Service Not Asked    Blood Transfusions Not Asked    Caffeine Concern Not Asked    Occupational Exposure Not Asked    Hobby Hazards Not Asked    Sleep Concern Not Asked    Stress Concern Not Asked    Weight Concern Not Asked    Special Diet Not Asked    Back Care Not Asked    Exercise Not Asked    Bike Helmet Not Asked    [address] Not Asked    Self-Exams Not Asked   Social History Narrative    Not on file     Social Determinants of Health     Financial Resource Strain:     Difficulty of Paying Living Expenses: Not on file   Food Insecurity:     Worried About Running Out of Food in the Last Year: Not on file    Ren of Food in the Last Year: Not on file   Transportation Needs:     Lack of Transportation (Medical): Not on file    Lack of Transportation (Non-Medical):  Not on file   Physical Activity:     Days of Exercise per Week: Not on file    Minutes of Exercise per Session: Not on file   Stress:     Feeling of Stress : Not on file   Social Connections:     Frequency of Communication with Friends and Family: Not on file    Frequency of Social Gatherings with Friends and Family: Not on file    Attends Sabianism Services: Not on file    Active Member of Clubs or Organizations: Not on file    Attends Club or Organization Meetings: Not on file    Marital Status: Not on file   Intimate Partner Violence:     Fear of Current or Ex-Partner: Not on file    Emotionally Abused: Not on file    Physically Abused: Not on file    Sexually Abused: Not on file   Housing Stability:     Unable to Pay for Housing in the Last Year: Not on file    Number of Places Lived in the Last Year: Not on file    Unstable Housing in the Last Year: Not on file         ALLERGIES: Patient has no known allergies. Review of Systems   Constitutional: Negative for chills, diaphoresis and fever. HENT: Negative for congestion, sneezing and sore throat. Eyes: Negative for visual disturbance. Respiratory: Positive for cough and shortness of breath. Negative for chest tightness and wheezing. Cardiovascular: Positive for leg swelling. Negative for chest pain. Gastrointestinal: Negative for abdominal pain, blood in stool, diarrhea, nausea and vomiting. Endocrine: Negative for polyuria. Genitourinary: Negative for difficulty urinating, dysuria, flank pain, hematuria and urgency. Musculoskeletal: Negative for back pain, myalgias, neck pain and neck stiffness. Skin: Negative for color change and rash. Neurological: Negative for dizziness, syncope, speech difficulty, weakness, light-headedness, numbness and headaches. Psychiatric/Behavioral: Negative for behavioral problems. All other systems reviewed and are negative. Vitals:    02/17/22 1928   BP: 139/89   Pulse: 69   Resp: 20   Temp: 97.6 °F (36.4 °C)   SpO2: 94%   Weight: 88 kg (194 lb)   Height: 6' (1.829 m)            Physical Exam  Vitals and nursing note reviewed. Constitutional:       General: He is not in acute distress. Appearance: He is well-developed. He is not diaphoretic. Comments: Alert and oriented to person place and time. No acute distress, speaks in clear, fluid sentences. HENT:      Head: Normocephalic and atraumatic. Right Ear: External ear normal.      Left Ear: External ear normal.      Nose: Nose normal.   Eyes:      Pupils: Pupils are equal, round, and reactive to light. Cardiovascular:      Rate and Rhythm: Normal rate and regular rhythm. Heart sounds: Normal heart sounds.  No murmur heard.  No friction rub. No gallop. Pulmonary:      Effort: Pulmonary effort is normal. No respiratory distress. Breath sounds: No stridor. Rales present. No decreased breath sounds, wheezing or rhonchi. Chest:      Chest wall: No tenderness. Abdominal:      General: There is no distension. Palpations: Abdomen is soft. There is no mass. Tenderness: There is no abdominal tenderness. There is no guarding or rebound. Hernia: No hernia is present. Musculoskeletal:         General: No tenderness or deformity. Normal range of motion. Cervical back: Normal range of motion. Comments: Bilateral lower extremity edema, 3+ pitting in nature. There is a superficial blister approximately half dollar sized to the anterior aspect of the patient's distal left shin   Skin:     General: Skin is warm and dry. Neurological:      Mental Status: He is alert and oriented to person, place, and time. Cranial Nerves: No cranial nerve deficit.           MDM  Number of Diagnoses or Management Options  Diagnosis management comments: EKG interpretation: Atrial flutter, rate of 65, normal axis, no ischemia       Amount and/or Complexity of Data Reviewed  Clinical lab tests: ordered and reviewed  Tests in the radiology section of CPT®: ordered and reviewed  Tests in the medicine section of CPT®: ordered and reviewed  Independent visualization of images, tracings, or specimens: yes    Risk of Complications, Morbidity, and/or Mortality  Presenting problems: moderate  Diagnostic procedures: low  Management options: moderate    Patient Progress  Patient progress: stable         Procedures

## 2022-02-18 NOTE — ED TRIAGE NOTES
Per report from [facility] patient brought in to the ER for worsening SOB and a newly found DVT in his left femoral segment. Patient has edema to legs bilaterally and is more SOB than normal requiring more oxygen. Per EMS patient was 90% on 3 liters which is his normal at home. Pt placed on 5 liters via NC in ER and o2 sat is 96%.

## 2022-02-18 NOTE — DISCHARGE INSTRUCTIONS
Your kidney numbers were elevated during your visit this evening. These values should be followed closely. Please arrange follow-up within 1 week to have your labs rechecked. Return for worsening symptoms, concerns or questions. Continue your medication as prescribed by your physician.

## 2022-03-18 PROBLEM — M79.89 LEG SWELLING: Status: ACTIVE | Noted: 2019-06-12

## 2022-03-18 PROBLEM — I48.0 PAROXYSMAL ATRIAL FIBRILLATION (HCC): Status: ACTIVE | Noted: 2019-06-12

## 2022-03-19 PROBLEM — N18.30 STAGE 3 CHRONIC KIDNEY DISEASE (HCC): Status: ACTIVE | Noted: 2020-06-18

## 2022-03-19 PROBLEM — E11.21 TYPE 2 DIABETES MELLITUS WITH NEPHROPATHY (HCC): Status: ACTIVE | Noted: 2018-01-23

## 2022-03-19 PROBLEM — E66.09 CLASS 1 OBESITY DUE TO EXCESS CALORIES WITH SERIOUS COMORBIDITY AND BODY MASS INDEX (BMI) OF 32.0 TO 32.9 IN ADULT: Status: ACTIVE | Noted: 2019-08-12

## 2022-03-19 PROBLEM — I10 SYSTEMIC HYPERTENSIVE DISORDER COMPLICATION: Status: ACTIVE | Noted: 2019-06-12

## 2022-03-19 PROBLEM — E11.21 DIABETIC NEPHROPATHY ASSOCIATED WITH TYPE 2 DIABETES MELLITUS (HCC): Status: ACTIVE | Noted: 2020-06-18

## 2022-03-20 PROBLEM — I48.11 LONGSTANDING PERSISTENT ATRIAL FIBRILLATION (HCC): Status: ACTIVE | Noted: 2020-06-18

## 2022-03-20 PROBLEM — J96.01 ACUTE RESPIRATORY FAILURE WITH HYPOXIA (HCC): Status: ACTIVE | Noted: 2021-05-03

## 2022-04-12 ENCOUNTER — HOSPITAL ENCOUNTER (OUTPATIENT)
Dept: WOUND CARE | Age: 82
Discharge: HOME | End: 2022-04-12
Attending: SURGERY
Payer: MEDICARE

## 2022-04-12 VITALS
DIASTOLIC BLOOD PRESSURE: 67 MMHG | WEIGHT: 202 LBS | SYSTOLIC BLOOD PRESSURE: 107 MMHG | RESPIRATION RATE: 18 BRPM | OXYGEN SATURATION: 96 % | HEIGHT: 69 IN | HEART RATE: 92 BPM | BODY MASS INDEX: 29.92 KG/M2 | TEMPERATURE: 98.3 F

## 2022-04-12 DIAGNOSIS — I87.331 CHRONIC VENOUS HYPERTENSION (IDIOPATHIC) WITH ULCER AND INFLAMMATION OF RIGHT LOWER EXTREMITY (HCC): Primary | ICD-10-CM

## 2022-04-12 DIAGNOSIS — L97.919 CHRONIC VENOUS HYPERTENSION (IDIOPATHIC) WITH ULCER AND INFLAMMATION OF RIGHT LOWER EXTREMITY (HCC): Primary | ICD-10-CM

## 2022-04-12 PROCEDURE — 99213 OFFICE O/P EST LOW 20 MIN: CPT

## 2022-04-12 PROCEDURE — 99214 OFFICE O/P EST MOD 30 MIN: CPT | Performed by: SURGERY

## 2022-04-12 RX ORDER — LIDOCAINE 40 MG/G
CREAM TOPICAL ONCE
Status: CANCELLED | OUTPATIENT
Start: 2022-04-12 | End: 2022-04-12

## 2022-04-12 RX ORDER — SILVER SULFADIAZINE 10 G/1000G
CREAM TOPICAL ONCE
Status: CANCELLED | OUTPATIENT
Start: 2022-04-12 | End: 2022-04-12

## 2022-04-12 RX ORDER — TRIAMCINOLONE ACETONIDE 1 MG/G
OINTMENT TOPICAL ONCE
Status: CANCELLED | OUTPATIENT
Start: 2022-04-12 | End: 2022-04-12

## 2022-04-12 RX ORDER — LIDOCAINE 50 MG/G
OINTMENT TOPICAL ONCE
Status: CANCELLED | OUTPATIENT
Start: 2022-04-12 | End: 2022-04-12

## 2022-04-12 RX ORDER — LIDOCAINE HYDROCHLORIDE 40 MG/ML
SOLUTION TOPICAL ONCE
Status: CANCELLED | OUTPATIENT
Start: 2022-04-12 | End: 2022-04-12

## 2022-04-12 RX ORDER — OXYCODONE HYDROCHLORIDE 5 MG/1
5 TABLET ORAL
COMMUNITY

## 2022-04-12 RX ORDER — BETAMETHASONE DIPROPIONATE 0.5 MG/G
OINTMENT TOPICAL ONCE
Status: CANCELLED | OUTPATIENT
Start: 2022-04-12 | End: 2022-04-12

## 2022-04-12 RX ORDER — GENTAMICIN SULFATE 1 MG/G
OINTMENT TOPICAL ONCE
Status: CANCELLED | OUTPATIENT
Start: 2022-04-12 | End: 2022-04-12

## 2022-04-12 RX ORDER — LIDOCAINE HYDROCHLORIDE 20 MG/ML
JELLY TOPICAL ONCE
Status: CANCELLED | OUTPATIENT
Start: 2022-04-12 | End: 2022-04-12

## 2022-04-12 RX ORDER — LIDOCAINE HYDROCHLORIDE 20 MG/ML
5 SOLUTION OROPHARYNGEAL ONCE
Status: CANCELLED | OUTPATIENT
Start: 2022-04-12 | End: 2022-04-12

## 2022-04-12 RX ORDER — MUPIROCIN 20 MG/G
OINTMENT TOPICAL ONCE
Status: CANCELLED | OUTPATIENT
Start: 2022-04-12 | End: 2022-04-12

## 2022-04-12 RX ORDER — BACITRACIN 500 [USP'U]/G
OINTMENT TOPICAL ONCE
Status: CANCELLED | OUTPATIENT
Start: 2022-04-12 | End: 2022-04-12

## 2022-04-12 RX ORDER — CLOBETASOL PROPIONATE 0.5 MG/G
OINTMENT TOPICAL ONCE
Status: CANCELLED | OUTPATIENT
Start: 2022-04-12 | End: 2022-04-12

## 2022-04-12 RX ORDER — AMOXICILLIN 250 MG
1 CAPSULE ORAL DAILY
COMMUNITY

## 2022-04-12 RX ORDER — BACITRACIN ZINC AND POLYMYXIN B SULFATE 500; 1000 [USP'U]/G; [USP'U]/G
OINTMENT TOPICAL ONCE
Status: CANCELLED | OUTPATIENT
Start: 2022-04-12 | End: 2022-04-12

## 2022-04-12 NOTE — WOUND CARE
[address]  [address], [address] [facility]   Phone: [phone]  Fax: [phone]    Patient: Estuardo Wolfe MRN: [id number]  SSN: [id number]    YOB: 1940  Age: 79 y.o. Sex: male       Return Appointment: 1 week with Dr. Landen Lares    Instructions:   Right Leg Wound:  Cleanse wound with normal saline. DO NOT GET WET IN SHOWER. Apply Xeroform- apply to wound bed. Cover with ABD or Optilock pads and rolled gauze. Cover dressing with Tubigrip from toes to knee. Change dressing daily. -Apligraf skin graft ordered for next week    Should you experience increased redness, swelling, pain, foul odor, size of wound(s), or have a temperature over 101 degrees please contact the [address] [place] [address] Road at [phone] or if after hours contact your primary care physician or go to the hospital emergency department.     Signed By: Shashi Harden RN     April 12, 2022

## 2022-04-12 NOTE — DISCHARGE INSTRUCTIONS
Right Leg Wound:  Cleanse wound with normal saline. DO NOT GET WET IN SHOWER. Apply Xeroform- apply to wound bed. Cover with ABD or Optilock pads and rolled gauze. Cover dressing with Tubigrip from toes to knee. Change dressing daily.      -Apligraf skin graft ordered for next week

## 2022-04-12 NOTE — WOUND CARE
04/12/22 0832   Wound Leg lower Anterior;Right; Lower   Date First Assessed/Time First Assessed: 04/12/22 0832   Present on Hospital Admission: Yes  Primary Wound Type: Venous Ulcer  Location: Leg lower  Wound Location Orientation: Anterior;Right; Lower   Wound Image    Wound Etiology Venous   Dressing Status Breakthrough drainage noted; Old drainage noted   Cleansed Cleansed with saline   Dressing/Treatment Alginate;ABD pad;Roll gauze   Wound Length (cm) 11 cm   Wound Width (cm) 9 cm   Wound Depth (cm) 0.2 cm   Wound Surface Area (cm^2) 99 cm^2   Wound Volume (cm^3) 19.8 cm^3   Wound Assessment Granulation tissue   Drainage Amount Moderate   Drainage Description Sanguineous   Wound Odor None   Sarah-Wound/Incision Assessment Blanchable erythema;Edematous; Warm   Wound Thickness Description Full thickness       Patient is taking ASA daily. Wound mechanically debrided with saline and gauze.

## 2022-04-12 NOTE — PROGRESS NOTES
[address], [address], [address] [place] [address] Rd  [phone]    Progress Notes   Kandi Gaucher       MRN: [id number]     : 1940     Age: 79 y.o. Subjective/HPI:   This patient is a 79 y.o. male resident of [facility] with congestive heart failure, A. fib, and diabetes seen and evaluated at the wound clinic for initial evaluation of venous ulcer of the right lower extremity. The ulcer has been present for many months. He has been receiving dressing changes with alginate and Tubigrip without significant improvement. Patient reports that the ulcer is quite painful. He has had some bloody drainage. Patient is on chronic anticoagulation. He denies having any fever or chills. He was seen in the emergency room 2022 at which time he was found to have a large blister of the right leg as well as some surrounding erythema. Old records were reviewed and he has not had any recent imaging studies. Patient does have history of having previous venous ulcers. ABIs from 2021 showed GRACE of 0.83 on the right and 0.96 on the left. Review of Systems  A comprehensive review of systems was negative except for that written in the HPI.     Past Medical History:   Diagnosis Date    A-fib [facility])     Arrhythmia     Arthritis     Benign essential hypertension 10/27/2016    BPH (benign prostatic hypertrophy) 2014    BXO (balanitis xerotica obliterans) 10/27/2016    CAD (coronary artery disease)     CHF (congestive heart failure) ([facility] 10/27/2016    Chronic kidney disease     Chronic pain     COVID-19     Diabetes ([facility] [facility]     Edema 10/27/2016    HTN (hypertension) 2014    Hypercholesterolemia 2014    Hypertensive heart disease without HF (heart failure) 10/27/2016    Hypertrophy of prostate with urinary obstruction and other lower urinary tract symptoms (LUTS) 2014    Hypertrophy of prostate without urinary obstruction and other lower urinary tract symptoms (LUTS) 2014    Osteoarthrosis, unspecified site 10/27/2016    PVD (peripheral vascular disease) ([facility] [address] 10/27/2016    Stroke ([facility] [address]     Urinary frequency 2014      Past Surgical History:   Procedure Laterality Date    HX ORTHOPAEDIC      hip replacement    HX UROLOGICAL      cystoscopy      No Known Allergies   Social History     Tobacco Use    Smoking status: Former Smoker     Quit date: 2000     Years since quittin.7    Smokeless tobacco: Never Used    Tobacco comment: Continues Cessation   Substance Use Topics    Alcohol use: No      Social History     Social History Narrative    Not on file     Family History   Problem Relation Age of Onset    Heart Disease Father       Cannot display prior to admission medications because the patient has not been admitted in this contact. Current Outpatient Medications   Medication Sig    apixaban (Eliquis) 2.5 mg tablet Take 5 mg by mouth two (2) times a day.  oxyCODONE IR (Roxicodone) 5 mg immediate release tablet Take 5 mg by mouth every four (4) hours as needed for Pain.  senna-docusate (PERICOLACE) 8.6-50 mg per tablet Take 1 Tablet by mouth daily.  metoprolol tartrate (LOPRESSOR) 25 mg tablet Take 1 Tab by mouth two (2) times a day.  aspirin delayed-release 81 mg tablet 2 daily (Patient taking differently: Take 162 mg by mouth daily.)    selenium sulfide 2.25 % sham Apply to scalp twice a week and wash after 10 minutes    losartan (COZAAR) 25 mg tablet Take 1 Tab by mouth daily.  atorvastatin (LIPITOR) 80 mg tablet TAKE 1 TABLET BY MOUTH NIGHTLY    allopurinoL (ZYLOPRIM) 300 mg tablet TAKE 1 TABLET BY MOUTH DAILY    acetaminophen (TYLENOL) 500 mg tablet Take 500 mg by mouth every six (6) hours as needed for Pain.     finasteride (PROSCAR) 5 mg tablet TAKE 1 TABLET BY MOUTH DAILY    diclofenac (VOLTAREN) 1 % gel 2-4 g daily on affected areas as needed for pain cheaper over-the-counter or with good Rx if not covered by insurance,    metFORMIN (GLUCOPHAGE) 500 mg tablet TAKE ONE TWICE A DAY (Patient taking differently: Take 500 mg by mouth. TAKE ONE TWICE A DAY)    multivitamin (ONE A DAY) tablet Take 1 Tab by mouth daily.  furosemide (LASIX) 20 mg tablet Take 1 Tab by mouth daily.  sildenafil, antihypertensive, (REVATIO) 20 mg tablet 2 daily as needed for erectile dysfunction    colchicine 0.6 mg tablet 1 bid prn as directed     No current facility-administered medications for this encounter. Objective:     Vitals:    04/12/22 0817   BP: 107/67   Pulse: 92   Resp: 18   Temp: 98.3 °F (36.8 °C)   SpO2: 96%   Weight: 91.6 kg (202 lb)   Height: 5' 9\" (1.753 m)       Physical Exam:  Ambulation: Wheelchair  Gen- the patient is well developed and in no acute distress  HEENT- no focal abnormalities of the scalp or face. Neck- no JVD or retractions  Lungs- normal respiratory effort. Cardiovascular:  Lower limb pulses: 2+. Abd- soft and no masses evident. Ext- warm without cyanosis. There is 2+ lower leg edema that appears well controlled. Skin- no jaundice or rashes. Large venous ulcer with good granulation tissue anterior right lower extremity. Please see the specific measurements and descriptions of the wound(s) below. There is no evidence of periwound induration or warmth. No purulence or odor. Neuro- alert and oriented x 3. No gross imotor deficits are present. Lower limb sensation is intact. Psychological: Affect normal. Mood normal. Memory intact. Wound Leg lower Anterior;Right; Lower (Active)   Wound Image   04/12/22 0832   Wound Etiology Venous 04/12/22 0832   Dressing Status Breakthrough drainage noted; Old drainage noted 04/12/22 0832   Cleansed Cleansed with saline 04/12/22 0832   Dressing/Treatment Alginate;ABD pad;Roll gauze 04/12/22 0832   Wound Length (cm) 11 cm 04/12/22 0832   Wound Width (cm) 9 cm 04/12/22 0832   Wound Depth (cm) 0.2 cm 04/12/22 0832   Wound Surface Area (cm^2) 99 cm^2 04/12/22 0832   Wound Volume (cm^3) 19.8 cm^3 04/12/22 0832   Wound Assessment Granulation tissue 04/12/22 0832   Drainage Amount Moderate 04/12/22 0832   Drainage Description Sanguineous 04/12/22 0832   Wound Odor None 04/12/22 0832   Sarah-Wound/Incision Assessment Blanchable erythema;Edematous; Warm 04/12/22 0832   Wound Thickness Description Full thickness 04/12/22 0832   Number of days: 0       [REMOVED] Wound Leg lower Left;Posterior #1 02/05/21 (Removed)   Wound Image   03/05/21 1104   Wound Etiology Venous 03/05/21 1104   Dressing Status Clean;Dry; Intact 02/19/21 1115   Cleansed Soap and water 02/19/21 1115   Dressing/Treatment Open to air 03/05/21 1104   Wound Length (cm) 0 cm 03/05/21 1104   Wound Width (cm) 0 cm 03/05/21 1104   Wound Depth (cm) 0 cm 03/05/21 1104   Wound Surface Area (cm^2) 0 cm^2 03/05/21 1104   Change in Wound Size % 100 03/05/21 1104   Wound Volume (cm^3) 0 cm^3 03/05/21 1104   Wound Healing % 100 03/05/21 1104   Wound Assessment Epithelialization 03/05/21 1104   Drainage Amount None 03/05/21 1104   Drainage Description Serous 02/12/21 1453   Wound Odor None 03/05/21 1104   Sarah-Wound/Incision Assessment Edematous 02/12/21 1453   Wound Thickness Description Full thickness 02/12/21 1453   Number of days: 28       [REMOVED] Wound Leg lower Right;Medial #1 04/02/21 (Removed)   Wound Image   04/16/21 1040   Wound Etiology Venous 04/16/21 1040   Dressing Status Clean;Dry; Intact 04/16/21 1040   Cleansed Cleansed with saline; Soap and water 04/16/21 1040   Wound Length (cm) 0 cm 04/16/21 1040   Wound Width (cm) 0 cm 04/16/21 1040   Wound Depth (cm) 0 cm 04/16/21 1040   Wound Surface Area (cm^2) 0 cm^2 04/16/21 1040   Change in Wound Size % 100 04/16/21 1040   Wound Volume (cm^3) 0 cm^3 04/16/21 1040   Wound Healing % 100 04/16/21 1040   Wound Assessment Epithelialization 04/16/21 1040   Drainage Amount None 04/16/21 1040   Drainage Description Serosanguinous 04/02/21 1209   Wound Odor None 04/02/21 1209   Sarah-Wound/Incision Assessment Edematous 04/16/21 1040   Edges Attached edges 04/02/21 1209   Wound Thickness Description Full thickness 04/02/21 1209   Number of days: 21       [REMOVED] Wound Leg lower Left; Anterior;Medial;Lateral #2 04/02/21 (Removed)   Wound Image    04/16/21 1040   Wound Etiology Traumatic 04/16/21 1040   Dressing Status Clean; Intact; New drainage noted; Old drainage noted 04/16/21 1040   Cleansed Cleansed with saline; Soap and water 04/16/21 1040   Wound Length (cm) 3 cm 04/16/21 1040   Wound Width (cm) 3 cm 04/16/21 1040   Wound Depth (cm) 0.1 cm 04/16/21 1040   Wound Surface Area (cm^2) 9 cm^2 04/16/21 1040   Change in Wound Size % 96.17 04/16/21 1040   Wound Volume (cm^3) 0.9 cm^3 04/16/21 1040   Wound Healing % 96 04/16/21 1040   Wound Assessment Epithelialization;Granulation tissue 04/16/21 1040   Drainage Amount Moderate 04/16/21 1040   Drainage Description Serous 04/16/21 1040   Wound Odor None 04/16/21 1040   Sarah-Wound/Incision Assessment Edematous 04/16/21 1040   Edges Attached edges 04/16/21 1040   Wound Thickness Description Full thickness 04/16/21 1040   Number of days: 21       [REMOVED] Wound Leg lower Left;Posterior 03/27/21 (Removed)   Number of days: 381       [REMOVED] Wound Leg lower Anterior;Right 03/27/21 (Removed)   Number of days: 27        Assessment:   Venous ulcer right lower extremity  Patient Active Problem List   Diagnosis Code    Urinary frequency R35.0    Benign prostatic hyperplasia with urinary hesitancy N40.1, R39.11    Diabetes mellitus type II, non insulin dependent ([facility]) E11.9    Hyperlipidemia E78.5    ED (erectile dysfunction) N52.9    Edema R60.9    PVD (peripheral vascular disease) ([facility]) I73.9    Osteoarthrosis, unspecified site M19.90    Acute on chronic diastolic congestive heart failure ([facility]) I50.33    CVA (cerebral vascular accident) ([address] I63.9    Type 2 diabetes mellitus with nephropathy (HCC) E11.21    Systemic hypertensive disorder complication K76    Leg swelling M79.89    Paroxysmal atrial fibrillation (HCC) I48.0    Class 1 obesity due to excess calories with serious comorbidity and body mass index (BMI) of 32.0 to 32.9 in adult E66.09, Z68.32    Longstanding persistent atrial fibrillation (HCC) I48.11    Diabetic nephropathy associated with type 2 diabetes mellitus (HCC) E11.21    Stage 3 chronic kidney disease ([facility] [address] N18.30    Acute respiratory failure with hypoxia ([facility] [address] J96.01     Plan:   Start dressing changes with Xeroform and Tubigrip. Since the ulcer has been present for several months we will proceed with placement of Apligraf to try to promote wound healing. Follow-up in 1 week for placement of Apligraf. Follow-up Information     Follow up With Specialties Details Why Contact Info    [address] In 1 week  [facility] [address] [place] [id number] [phone]            Thank you very much for the opportunity to participate in this patient's care. Signed By: Cris Torre MD     April 12, 2022        TIME:  If total time for today's E/M service is used for level of service it is documented below.     This time includes physician non-face-to-face service time visit on the date of service and includes    Preparing to see the patient (eg, review of tests)  Obtaining and/or reviewing separately obtained history  Performing a medically necessary appropriate examination and/or evaluation  Counseling and educating the patient/family/caregiver  Ordering medications, tests, or procedures  Referring and communicating with other health care professionals as needed  Documenting clinical information in the electronic or other health record  Independently interpreting results (not reported separately) and communicating results to the patient/family/caregiver  Care coordination (not reported separately)    E/M time =     20     minutes.

## 2022-04-19 ENCOUNTER — HOSPITAL ENCOUNTER (OUTPATIENT)
Dept: WOUND CARE | Age: 82
Discharge: HOME | End: 2022-04-19
Attending: SURGERY
Payer: MEDICARE

## 2022-04-19 VITALS
OXYGEN SATURATION: 99 % | HEART RATE: 92 BPM | HEIGHT: 69 IN | DIASTOLIC BLOOD PRESSURE: 67 MMHG | WEIGHT: 202 LBS | BODY MASS INDEX: 29.92 KG/M2 | RESPIRATION RATE: 18 BRPM | SYSTOLIC BLOOD PRESSURE: 113 MMHG | TEMPERATURE: 97.8 F

## 2022-04-19 DIAGNOSIS — I87.331 CHRONIC VENOUS HYPERTENSION (IDIOPATHIC) WITH ULCER AND INFLAMMATION OF RIGHT LOWER EXTREMITY (HCC): Primary | ICD-10-CM

## 2022-04-19 DIAGNOSIS — L97.919 CHRONIC VENOUS HYPERTENSION (IDIOPATHIC) WITH ULCER AND INFLAMMATION OF RIGHT LOWER EXTREMITY (HCC): Primary | ICD-10-CM

## 2022-04-19 PROCEDURE — 99213 OFFICE O/P EST LOW 20 MIN: CPT | Performed by: SURGERY

## 2022-04-19 PROCEDURE — 99213 OFFICE O/P EST LOW 20 MIN: CPT

## 2022-04-19 RX ORDER — LIDOCAINE 50 MG/G
OINTMENT TOPICAL ONCE
OUTPATIENT
Start: 2022-04-19 | End: 2022-04-19

## 2022-04-19 RX ORDER — BACITRACIN ZINC AND POLYMYXIN B SULFATE 500; 1000 [USP'U]/G; [USP'U]/G
OINTMENT TOPICAL ONCE
OUTPATIENT
Start: 2022-04-19 | End: 2022-04-19

## 2022-04-19 RX ORDER — LIDOCAINE HYDROCHLORIDE 20 MG/ML
5 SOLUTION OROPHARYNGEAL ONCE
OUTPATIENT
Start: 2022-04-19 | End: 2022-04-19

## 2022-04-19 RX ORDER — BACITRACIN 500 [USP'U]/G
OINTMENT TOPICAL ONCE
OUTPATIENT
Start: 2022-04-19 | End: 2022-04-19

## 2022-04-19 RX ORDER — LIDOCAINE HYDROCHLORIDE 20 MG/ML
JELLY TOPICAL ONCE
OUTPATIENT
Start: 2022-04-19 | End: 2022-04-19

## 2022-04-19 RX ORDER — GENTAMICIN SULFATE 1 MG/G
OINTMENT TOPICAL ONCE
OUTPATIENT
Start: 2022-04-19 | End: 2022-04-19

## 2022-04-19 RX ORDER — BETAMETHASONE DIPROPIONATE 0.5 MG/G
OINTMENT TOPICAL ONCE
OUTPATIENT
Start: 2022-04-19 | End: 2022-04-19

## 2022-04-19 RX ORDER — SILVER SULFADIAZINE 10 G/1000G
CREAM TOPICAL ONCE
OUTPATIENT
Start: 2022-04-19 | End: 2022-04-19

## 2022-04-19 RX ORDER — MUPIROCIN 20 MG/G
OINTMENT TOPICAL ONCE
OUTPATIENT
Start: 2022-04-19 | End: 2022-04-19

## 2022-04-19 RX ORDER — LIDOCAINE HYDROCHLORIDE 40 MG/ML
SOLUTION TOPICAL ONCE
OUTPATIENT
Start: 2022-04-19 | End: 2022-04-19

## 2022-04-19 RX ORDER — LIDOCAINE 40 MG/G
CREAM TOPICAL ONCE
OUTPATIENT
Start: 2022-04-19 | End: 2022-04-19

## 2022-04-19 RX ORDER — CLOBETASOL PROPIONATE 0.5 MG/G
OINTMENT TOPICAL ONCE
OUTPATIENT
Start: 2022-04-19 | End: 2022-04-19

## 2022-04-19 RX ORDER — TRIAMCINOLONE ACETONIDE 1 MG/G
OINTMENT TOPICAL ONCE
OUTPATIENT
Start: 2022-04-19 | End: 2022-04-19

## 2022-04-19 NOTE — PROGRESS NOTES
[address], [address], [address]  [phone]    Progress Notes   Keila Delgado       MRN: [id number]     : 1940     Age: 79 y.o. Subjective/HPI:   This patient is a 79 y.o. male resident of [facility] with congestive heart failure, A. fib, and diabetes seen and evaluated at the wound clinic for follow-up of venous ulcer of the right lower extremity. He has been receiving dressing changes with Xeroform gauze and Tubigrip. On today's visit he reports some itching but otherwise has been doing well. The ulcer is less painful. He has not noted any redness or drainage. He did develop to small new spots on the left leg. No significant changes in overall health since previous visit. Patient is unable to afford the co-pay for Apligraf. Review of Systems  A comprehensive review of systems was negative except for that written in the HPI.     Past Medical History:   Diagnosis Date    A-fib [facility])     Arrhythmia     Arthritis     Benign essential hypertension 10/27/2016    BPH (benign prostatic hypertrophy) 2014    BXO (balanitis xerotica obliterans) 10/27/2016    CAD (coronary artery disease)     CHF (congestive heart failure) ([address] 10/27/2016    Chronic kidney disease     Chronic pain     COVID-19     Diabetes ([address]     Edema 10/27/2016    HTN (hypertension) 2014    Hypercholesterolemia 2014    Hypertensive heart disease without HF (heart failure) 10/27/2016    Hypertrophy of prostate with urinary obstruction and other lower urinary tract symptoms (LUTS) 2014    Hypertrophy of prostate without urinary obstruction and other lower urinary tract symptoms (LUTS) 2014    Osteoarthrosis, unspecified site 10/27/2016    PVD (peripheral vascular disease) ([address] 10/27/2016    [facility]     Urinary frequency 2014      Past Surgical History:   Procedure Laterality Date    HX ORTHOPAEDIC      hip replacement    HX UROLOGICAL      cystoscopy      No Known Allergies   Social History     Tobacco Use    Smoking status: Former Smoker     Quit date: 2000     Years since quittin.7    Smokeless tobacco: Never Used    Tobacco comment: Continues Cessation   Substance Use Topics    Alcohol use: No      Social History     Social History Narrative    Not on file     Family History   Problem Relation Age of Onset    Heart Disease Father       Cannot display prior to admission medications because the patient has not been admitted in this contact. Current Outpatient Medications   Medication Sig    apixaban (Eliquis) 2.5 mg tablet Take 5 mg by mouth two (2) times a day.  oxyCODONE IR (Roxicodone) 5 mg immediate release tablet Take 5 mg by mouth every four (4) hours as needed for Pain.  senna-docusate (PERICOLACE) 8.6-50 mg per tablet Take 1 Tablet by mouth daily.  metoprolol tartrate (LOPRESSOR) 25 mg tablet Take 1 Tab by mouth two (2) times a day.  losartan (COZAAR) 25 mg tablet Take 1 Tab by mouth daily.  atorvastatin (LIPITOR) 80 mg tablet TAKE 1 TABLET BY MOUTH NIGHTLY    allopurinoL (ZYLOPRIM) 300 mg tablet TAKE 1 TABLET BY MOUTH DAILY    acetaminophen (TYLENOL) 500 mg tablet Take 500 mg by mouth every six (6) hours as needed for Pain.  finasteride (PROSCAR) 5 mg tablet TAKE 1 TABLET BY MOUTH DAILY    diclofenac (VOLTAREN) 1 % gel 2-4 g daily on affected areas as needed for pain cheaper over-the-counter or with good Rx if not covered by insurance,    metFORMIN (GLUCOPHAGE) 500 mg tablet TAKE ONE TWICE A DAY (Patient taking differently: Take 500 mg by mouth. TAKE ONE TWICE A DAY)    multivitamin (ONE A DAY) tablet Take 1 Tab by mouth daily.  furosemide (LASIX) 20 mg tablet Take 1 Tab by mouth daily.     sildenafil, antihypertensive, (REVATIO) 20 mg tablet 2 daily as needed for erectile dysfunction    aspirin delayed-release 81 mg tablet 2 daily (Patient taking differently: Take 162 mg by mouth daily.)    colchicine 0.6 mg tablet 1 bid prn as directed    selenium sulfide 2.25 % sham Apply to scalp twice a week and wash after 10 minutes     No current facility-administered medications for this encounter. Objective:     Vitals:    04/19/22 0700 04/19/22 0836   BP: 113/67    Pulse: 92    Resp: 18    Temp: 97.8 °F (36.6 °C)    SpO2: 99%    Weight:  91.6 kg (202 lb)   Height:  5' 9\" (1.753 m)       Physical Exam:  Ambulation: Wheelchair  Gen- the patient is well developed and in no acute distress  HEENT- no focal abnormalities of the scalp or face. Neck- no JVD or retractions  Lungs- normal respiratory effort. Cardiovascular:  Lower limb pulses: 2+. Abd- soft and no masses evident. Ext- warm without cyanosis. There is 2+ lower leg edema that appears well controlled. Skin- no jaundice or rashes. Venous ulcer on the right has new epithelialization and looks improved, 2 small ulcers anterior left lower extremity. Please see the specific measurements and descriptions of the wound(s) below. There is no evidence of periwound induration or warmth. No purulence or odor. Neuro- alert and oriented x 3. No gross imotor deficits are present. Lower limb sensation is intact. Psychological: Affect normal. Mood normal. Memory intact. Wound Leg lower Anterior;Right; Lower (Active)   Wound Image   04/19/22 0857   Wound Etiology Venous 04/19/22 0857   Dressing Status Old drainage noted 04/19/22 0857   Cleansed Soap and water 04/19/22 0857   Dressing/Treatment Xeroform;ABD pad;Roll gauze 04/19/22 0857   Wound Length (cm) 11 cm 04/19/22 0857   Wound Width (cm) 10 cm 04/19/22 0857   Wound Depth (cm) 0.1 cm 04/19/22 0857   Wound Surface Area (cm^2) 110 cm^2 04/19/22 0857   Change in Wound Size % -11.11 04/19/22 0857   Wound Volume (cm^3) 11 cm^3 04/19/22 0857   Wound Healing % 44 04/19/22 0857   Wound Assessment Granulation tissue; Epithelialization 04/19/22 0857 Drainage Amount Small 04/19/22 0857   Drainage Description Serous 04/19/22 0857   Wound Odor None 04/19/22 0857   Sarah-Wound/Incision Assessment Edematous 04/19/22 0857   Wound Thickness Description Full thickness 04/19/22 0857   Number of days: 7       Wound Leg lower Left; Anterior (Active)   Wound Image   04/19/22 0857   Wound Etiology Venous 04/19/22 0857   Dressing Status Old drainage noted 04/19/22 0857   Cleansed Soap and water 04/19/22 0857   Dressing/Treatment Xeroform;ABD pad;Roll gauze 04/19/22 0857   Wound Length (cm) 0.5 cm 04/19/22 0857   Wound Width (cm) 0.5 cm 04/19/22 0857   Wound Depth (cm) 0.1 cm 04/19/22 0857   Wound Surface Area (cm^2) 0.25 cm^2 04/19/22 0857   Wound Volume (cm^3) 0.025 cm^3 04/19/22 0857   Wound Assessment Pink/red 04/19/22 0857   Drainage Amount Small 04/19/22 0857   Drainage Description Sanguineous 04/19/22 0857   Wound Odor None 04/19/22 0857   Sarah-Wound/Incision Assessment Edematous 04/19/22 0857   Wound Thickness Description Full thickness 04/19/22 0857   Number of days: 0       [REMOVED] Wound Leg lower Left;Posterior #1 02/05/21 (Removed)   Wound Image   03/05/21 1104   Wound Etiology Venous 03/05/21 1104   Dressing Status Clean;Dry; Intact 02/19/21 1115   Cleansed Soap and water 02/19/21 1115   Dressing/Treatment Open to air 03/05/21 1104   Wound Length (cm) 0 cm 03/05/21 1104   Wound Width (cm) 0 cm 03/05/21 1104   Wound Depth (cm) 0 cm 03/05/21 1104   Wound Surface Area (cm^2) 0 cm^2 03/05/21 1104   Change in Wound Size % 100 03/05/21 1104   Wound Volume (cm^3) 0 cm^3 03/05/21 1104   Wound Healing % 100 03/05/21 1104   Wound Assessment Epithelialization 03/05/21 1104   Drainage Amount None 03/05/21 1104   Drainage Description Serous 02/12/21 1453   Wound Odor None 03/05/21 1104   Sarah-Wound/Incision Assessment Edematous 02/12/21 1453   Wound Thickness Description Full thickness 02/12/21 1453   Number of days: 28       [REMOVED] Wound Leg lower Right;Medial #1 04/02/21 (Removed)   Wound Image   04/16/21 1040   Wound Etiology Venous 04/16/21 1040   Dressing Status Clean;Dry; Intact 04/16/21 1040   Cleansed Cleansed with saline; Soap and water 04/16/21 1040   Wound Length (cm) 0 cm 04/16/21 1040   Wound Width (cm) 0 cm 04/16/21 1040   Wound Depth (cm) 0 cm 04/16/21 1040   Wound Surface Area (cm^2) 0 cm^2 04/16/21 1040   Change in Wound Size % 100 04/16/21 1040   Wound Volume (cm^3) 0 cm^3 04/16/21 1040   Wound Healing % 100 04/16/21 1040   Wound Assessment Epithelialization 04/16/21 1040   Drainage Amount None 04/16/21 1040   Drainage Description Serosanguinous 04/02/21 1209   Wound Odor None 04/02/21 1209   Sarah-Wound/Incision Assessment Edematous 04/16/21 1040   Edges Attached edges 04/02/21 1209   Wound Thickness Description Full thickness 04/02/21 1209   Number of days: 21       [REMOVED] Wound Leg lower Left; Anterior;Medial;Lateral #2 04/02/21 (Removed)   Wound Image    04/16/21 1040   Wound Etiology Traumatic 04/16/21 1040   Dressing Status Clean; Intact; New drainage noted; Old drainage noted 04/16/21 1040   Cleansed Cleansed with saline; Soap and water 04/16/21 1040   Wound Length (cm) 3 cm 04/16/21 1040   Wound Width (cm) 3 cm 04/16/21 1040   Wound Depth (cm) 0.1 cm 04/16/21 1040   Wound Surface Area (cm^2) 9 cm^2 04/16/21 1040   Change in Wound Size % 96.17 04/16/21 1040   Wound Volume (cm^3) 0.9 cm^3 04/16/21 1040   Wound Healing % 96 04/16/21 1040   Wound Assessment Epithelialization;Granulation tissue 04/16/21 1040   Drainage Amount Moderate 04/16/21 1040   Drainage Description Serous 04/16/21 1040   Wound Odor None 04/16/21 1040   Sarah-Wound/Incision Assessment Edematous 04/16/21 1040   Edges Attached edges 04/16/21 1040   Wound Thickness Description Full thickness 04/16/21 1040   Number of days: 21       [REMOVED] Wound Leg lower Left;Posterior 03/27/21 (Removed)   Number of days: 381       [REMOVED] Wound Leg lower Anterior;Right 03/27/21 (Removed)   Number of days: 27        Assessment:   Bilateral venous ulcers, right side improved  Patient Active Problem List   Diagnosis Code    Urinary frequency R35.0    Benign prostatic hyperplasia with urinary hesitancy N40.1, R39.11    Diabetes mellitus type II, non insulin dependent (HCC) E11.9    Hyperlipidemia E78.5    ED (erectile dysfunction) N52.9    Edema R60.9    PVD (peripheral vascular disease) ([facility]) I73.9    Osteoarthrosis, unspecified site M19.90    Acute on chronic diastolic congestive heart failure ([facility]) I50.33    CVA (cerebral vascular accident) ([facility] I63.9    Type 2 diabetes mellitus with nephropathy ([facility] E11.21    Systemic hypertensive disorder complication O78    Leg swelling M79.89    Paroxysmal atrial fibrillation (HCC) I48.0    Class 1 obesity due to excess calories with serious comorbidity and body mass index (BMI) of 32.0 to 32.9 in adult E66.09, Z68.32    Longstanding persistent atrial fibrillation ([facility]) I48.11    Diabetic nephropathy associated with type 2 diabetes mellitus ([facility]) E11.21    Stage 3 chronic kidney disease ([facility] [address] N18.30    Acute respiratory failure with hypoxia ([facility]) J96.01    Chronic venous hypertension (idiopathic) with ulcer and inflammation of right lower extremity ([facility]) I87.331, L97.919     Plan:   Continue dressing changes with Xeroform and Tubigrip, follow-up in 2 weeks for recheck. Patient advised to keep legs elevated is much as possible. Follow-up Information    None           Thank you very much for the opportunity to participate in this patient's care. Signed By: Edwin Christine MD     April 19, 2022        TIME:  If total time for today's E/M service is used for level of service it is documented below.     This time includes physician non-face-to-face service time visit on the date of service and includes    Preparing to see the patient (eg, review of tests)  Obtaining and/or reviewing separately obtained history  Performing a medically necessary appropriate examination and/or evaluation  Counseling and educating the patient/family/caregiver  Ordering medications, tests, or procedures  Referring and communicating with other health care professionals as needed  Documenting clinical information in the electronic or other health record  Independently interpreting results (not reported separately) and communicating results to the patient/family/caregiver  Care coordination (not reported separately)    E/M time =      20    minutes.

## 2022-04-19 NOTE — WOUND CARE
[address]  [facility] [address], [address]   Phone: [phone]  Fax: [phone]    Patient: Jo-Ann Zamora MRN: [id number]  SSN: [id number]    YOB: 1940  Age: 79 y.o. Sex: male       Return Appointment: 2 weeks with Dr. Se Liu    Instructions: Right and Left Anterior Lower Leg Wounds:  Cleanse wound with normal saline. DO NOT GET WET IN SHOWER. Apply Xeroform- apply only to open areas. Cover with ABD and wrap with rolled gauze or Kerlix. Cover dressing with Tubigrip from toes to knees. Change dressing daily.      Should you experience increased redness, swelling, pain, foul odor, size of wound(s), or have a temperature over 101 degrees please contact the [address] [place] [address] Road at [phone] or if after hours contact your primary care physician or go to the hospital emergency department.     Signed By: Brian Youssef, PT, [address] [facility]     April 19, 2022

## 2022-04-19 NOTE — DISCHARGE INSTRUCTIONS
[address]   [address], [address]  Phone: [phone]  Fax: [phone]    Patient: Naveen Garcia MRN: [id number]  SSN: [id number]    YOB: 1940  Age: 79 y.o. Sex: male       Return Appointment: 2 weeks with Dr. Marie Ayala    Instructions: Right and Left Anterior Lower Leg Wounds:  Cleanse wound with normal saline. DO NOT GET WET IN SHOWER. Apply Xeroform- apply only to open areas. Cover with ABD and wrap with rolled gauze or Kerlix. Cover dressing with Tubigrip from toes to knees. Change dressing daily. Should you experience increased redness, swelling, pain, foul odor, size of wound(s), or have a temperature over 101 degrees please contact the [address] [place] [address] Road at [phone] or if after hours contact your primary care physician or go to the hospital emergency department.     Signed By: Anthony Foster PT, [facility]     April 19, 2022

## 2022-04-19 NOTE — PROGRESS NOTES
04/19/22 0857   Wound Leg lower Left; Anterior   Date First Assessed/Time First Assessed: 04/19/22 0859   Present on Hospital Admission: Yes  Primary Wound Type: Venous Ulcer  Location: Leg lower  Wound Location Orientation: Left; Anterior   Wound Image    Wound Etiology Venous   Dressing Status Old drainage noted   Cleansed Soap and water   Dressing/Treatment Xeroform;ABD pad;Roll gauze   Wound Length (cm) 0.5 cm   Wound Width (cm) 0.5 cm   Wound Depth (cm) 0.1 cm   Wound Surface Area (cm^2) 0.25 cm^2   Wound Volume (cm^3) 0.025 cm^3   Wound Assessment Pink/red   Drainage Amount Small   Drainage Description Sanguineous   Wound Odor None   Sarah-Wound/Incision Assessment Edematous   Wound Thickness Description Full thickness   Wound Leg lower Anterior;Right; Lower   Date First Assessed/Time First Assessed: 04/12/22 0832   Present on Hospital Admission: Yes  Primary Wound Type: Venous Ulcer  Location: Leg lower  Wound Location Orientation: Anterior;Right; Lower   Wound Image    Wound Etiology Venous   Dressing Status Old drainage noted   Cleansed Soap and water   Dressing/Treatment Xeroform;ABD pad;Roll gauze   Wound Length (cm) 11 cm   Wound Width (cm) 10 cm   Wound Depth (cm) 0.1 cm   Wound Surface Area (cm^2) 110 cm^2   Change in Wound Size % -11.11   Wound Volume (cm^3) 11 cm^3   Wound Healing % 44   Wound Assessment Granulation tissue; Epithelialization   Drainage Amount Small   Drainage Description Serous   Wound Odor None   Sarah-Wound/Incision Assessment Edematous   Wound Thickness Description Full thickness   Patient is currently taking Eliquis and Aspirin 81 mg

## 2022-06-06 ENCOUNTER — HOSPITAL ENCOUNTER (OUTPATIENT)
Dept: LAB | Age: 82
Discharge: HOME | End: 2022-06-09

## 2022-06-06 LAB
ANION GAP SERPL CALC-SCNC: 8 MMOL/L (ref 7–16)
BASOPHILS # BLD: 0.1 K/UL (ref 0–0.2)
BASOPHILS NFR BLD: 2 % (ref 0–2)
BUN SERPL-MCNC: 27 MG/DL (ref 8–23)
CALCIUM SERPL-MCNC: 9.3 MG/DL (ref 8.3–10.4)
CHLORIDE SERPL-SCNC: 104 MMOL/L (ref 98–107)
CO2 SERPL-SCNC: 23 MMOL/L (ref 21–32)
CREAT SERPL-MCNC: 1.9 MG/DL (ref 0.8–1.5)
DIFFERENTIAL METHOD BLD: ABNORMAL
EOSINOPHIL # BLD: 0.2 K/UL (ref 0–0.8)
EOSINOPHIL NFR BLD: 5 % (ref 0.5–7.8)
ERYTHROCYTE [DISTWIDTH] IN BLOOD BY AUTOMATED COUNT: 21.1 % (ref 11.9–14.6)
GLUCOSE SERPL-MCNC: 62 MG/DL (ref 65–100)
HCT VFR BLD AUTO: 36.1 % (ref 41.1–50.3)
HGB BLD-MCNC: 11.5 G/DL (ref 13.6–17.2)
IMM GRANULOCYTES # BLD AUTO: 0 K/UL (ref 0–0.5)
IMM GRANULOCYTES NFR BLD AUTO: 0 % (ref 0–5)
LYMPHOCYTES # BLD: 0.9 K/UL (ref 0.5–4.6)
LYMPHOCYTES NFR BLD: 18 % (ref 13–44)
MCH RBC QN AUTO: 29.6 PG (ref 26.1–32.9)
MCHC RBC AUTO-ENTMCNC: 31.9 G/DL (ref 31.4–35)
MCV RBC AUTO: 92.8 FL (ref 79.6–97.8)
MONOCYTES # BLD: 0.6 K/UL (ref 0.1–1.3)
MONOCYTES NFR BLD: 13 % (ref 4–12)
NEUTS SEG # BLD: 2.9 K/UL (ref 1.7–8.2)
NEUTS SEG NFR BLD: 62 % (ref 43–78)
NRBC # BLD: 0.02 K/UL (ref 0–0.2)
PLATELET # BLD AUTO: 197 K/UL (ref 150–450)
PMV BLD AUTO: 12.5 FL (ref 9.4–12.3)
POTASSIUM SERPL-SCNC: 4.4 MMOL/L (ref 3.5–5.1)
RBC # BLD AUTO: 3.89 M/UL (ref 4.23–5.6)
SODIUM SERPL-SCNC: 135 MMOL/L (ref 138–145)
WBC # BLD AUTO: 4.6 K/UL (ref 4.3–11.1)

## 2022-06-06 PROCEDURE — 80048 BASIC METABOLIC PNL TOTAL CA: CPT

## 2022-06-06 PROCEDURE — 85025 COMPLETE CBC W/AUTO DIFF WBC: CPT
